# Patient Record
Sex: FEMALE | Race: WHITE | Employment: OTHER | URBAN - METROPOLITAN AREA
[De-identification: names, ages, dates, MRNs, and addresses within clinical notes are randomized per-mention and may not be internally consistent; named-entity substitution may affect disease eponyms.]

---

## 2019-12-17 ENCOUNTER — OFFICE VISIT (OUTPATIENT)
Dept: NEUROPSYCHOLOGY | Facility: CLINIC | Age: 74
End: 2019-12-17
Payer: MEDICARE

## 2019-12-17 DIAGNOSIS — G31.84 MCI (MILD COGNITIVE IMPAIRMENT): Primary | ICD-10-CM

## 2019-12-17 NOTE — NURSING NOTE
The patient was seen for neuropsychological evaluation at the request of Dr. Ketan Vieira, for the purposes of diagnostic clarification and treatment planning. 160 minutes of test administration and scoring were provided by this writer, Austin Adamson. Please see Dr. Zaheer Herrera's report for a full interpretation of the findings.

## 2020-01-08 NOTE — PROGRESS NOTES
Name: Lisa Galloway MRN: [id number]  : 1945 STANTON: 2019  Staff: MATHEUS Tech: ALIREZA Age: 74  Sex: Female Hand: LH Educ: 16  Vision: 20/40 ?with correction / ?without correction    ORIENTATION     Time  -5     Place       Personal info     3 /4     Presidents 3 /6    WRAT4   SS %ile Grade Equiv.     Word Reading  105 63 12.9    WAIS-IV     VCI: (108)    SHAWNEE: (98)          Raw SSa     Similarities  26 11     Vocabulary  45 12     Block Design  24 8     Matrix Reasoning 14 11     Arithmetic  10 7     Coding  59 12    COWAT (CFL)     Raw: 35  SS: 10 %ile: 41-59    ANIMAL NAMING TEST     Raw: 18  SS: 9 T: 45    BOSTON NAMING TEST     Raw: 54  SS: 10 %ile: 41-59    COMPLEX IDEATIONAL MATERIAL     Raw:  SS: 12 T: 54    CLOCK DRAWING: Normal    FINGER TAPPING   Avg  SS T     RH  52 10 64     LH 47.67 10 62     TRAILMAKING TEST   Raw  Err SS %ile     A 43  0 9 29-40     B 151  0 8 19-28    PORTEUS MAZE TEST     Test Age: 10.5    JOSE-O    Raw    T %ile     Time to Copy  150      >16     Copy    33     >16     Short Delay Recall 4.5 29 2    WMS-IV  Raw SS / %ile     LM I  35 11     LM II  16 9     LM Recog. 21 >75th    HVLT-R     Trial 1 2 3      6 7 7  Raw T     Total Learning (1-3) 20 44     Delayed Recall  6 42     Percent Retention 86 50     Recognition Hits/FP 12/0 60    BVMT-R     Trial 1 2 3      2 2 2  Raw T / %ile     Total Learning (1-3) 6 26     Delayed Recall  2 27     Percent Retention 100 >16th     Recognition Hits/FP 3/0 3rd-5th     GDS (30-item)     Raw:  5 Interpretation: Minimal

## 2020-01-08 NOTE — PROGRESS NOTES
NAME: Lisa Galloway  MRN: [id number]  : 1945  STANTON: 2019  [facility]  [address]  [address] [phone]    NEUROPSYCHOLOGICAL EVALUATION    RELEVANT HISTORY AND REASON FOR REFERRAL    This is a report of neuropsychological consultation regarding Lisa Galloway, a 74-year-old, left-handed woman with 16 years of formal education. She presents with concerns about cognitive changes noticed over the last few years and seeming to progressively worsen. Short-term memory seems to be the primary issue, and she reportedly demonstrates rapid forgetting. She also seems to get off task easily. She saw Dr. Ketan Vieira of the [facility] system for neurologic evaluation of these concerns in September. Orientation was mildly reduced, receptive and expressive language functions seemed okay, and the physical examination showed no focal neurologic signs. Her history of breast cancer was discussed, noting that brain MRI showed no evidence of metastases or abnormal enhancement within the meninges (just some small vessel ischemic changes). Some mental health issues could have been contributing to her cognitive problems but did not seem to be the primary concern. Dr. Vieira ordered this neuropsychological evaluation of brain functioning, for further characterization of her cognitive concerns and to aid in diagnosis and treatment planning.    Ms. Galloway is accompanied to this evaluation by her , Margareth, and her son, Nithin. They describe the presenting concerns as above. They note that she slipped and fell on ice a few weeks ago. She struck the rear of her head, but there was no loss of consciousness and no apparent cognitive changes afterward. She did not seek out clinical attention. There have been no other concerns about potential TBI. She has never had a stroke. She has never had a seizure. She has some occasional dizziness, but there are no  major issues with balance, coordination, or motor control. Her mental status does not fluctuate significantly. She has never had an episode of delirium. She has never had a hallucinatory experience. There are no indications of REM sleep behaviors. She has been wearing hearing aids for the last 3-4 years. She denies any changes to her senses of smell, taste, or vision.    Her energy is lower than normal. Her sleep feels fine and pretty restorative, but she is generally tired during the day. She denies any unintentional sleeping and does not really take naps. She does not use sleep aids. She drinks about 1-2 cups of coffee per day.    She has lost about 5 pounds over the last year. She describes pain in her right hip that she treats with a topical cream. Additional medications include sertraline, losartan, gabapentin, zoledronic acid, ribociclib, cetirizine, albuterol, and supplements for vitamin C, iron, omega-3 fatty acids, multivitamin, cranberry concentrate, and selenium.    Ms. Galloway lives at home with her . They have been  for 52 years. They have three children, two of whom live in the area and one who lives in [place]. Her  has always done most of the household finances, but he has also had to take over some of the few things that she did. She dislikes driving at night but otherwise says she has not had to change or limit her driving habits.    There is no history of psychiatric hospitalizations. She feels that anxiousness is mild and occasional and does not require clinical attention (sertraline was apparently just recently started). She feels like spending time with friends, such as going for walks, is helpful. She denies any suicidality.    She reports minimal or rare alcohol use with no history of alcohol problems. She denies any use of tobacco or illicit drugs.    There is no known history of early life major medical complications, developmental abnormalities, or academic delays. In  fact, she says she was always at the top of her class in school. She graduated from high school and went on to complete an elementary education degree through [employer]. She worked as a teacher for about two years and then stayed at home to run the household and raise the children. Later on, she was a . 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 She retired around age 62.    Reported family history includes noticeable cognitive decline for her father, without any official diagnosis. His cognitive problems began around age 90, and he  at age 94. Today, they report no other known history of neurologic concerns in the family. Dr. Vieira s chart note says that her mother had cognitive impairment at age 97. There is no known history of mental health concerns in the family.    BEHAVIORAL OBSERVATIONS    Ms. Galloway was polite and cooperative with the evaluation. Mood was neutral, with a mildly tense affective display. She was alert and not somnolent. Immediate attentional control was appropriate. Insight into the referral question was generally appropriate. Thought processes appeared to be linear and goal-directed. Comprehension was normal. There were no aphasic qualities to her speech production. She tended to be mildly slowed in her approach to most tasks. She seemed to work with appropriate care and precision. Her effort and persistence were good throughout the evaluation. The test results are seen as valid estimations of her cognitive abilities.    MEASURES ADMINISTERED    The following measures were administered by a trained psychometrist, under my direct supervision:    Orientation: Time, Place, Basic Personal Information, Recent US Presidents; Wide Range Achievement Test 4: Word Reading; Wechsler Adult Intelligence Scale-IV: Similarities, Vocabulary, Block Design, Matrix Reasoning, Arithmetic, Coding; Controlled Oral Word  Association Test; Animal Naming Test; [place] Naming Test; Complex Ideational Material; Josue Visual Acuity Screen; Clock Drawing; Finger Tapping; Trail Making Test; Porteus Maze Test; Chandrakant-Osterrieth Complex Figure Test; Tran Verbal Learning Test, Revised; Brief Visuospatial Memory Test, Revised; Wechsler Memory Scale-IV: Logical Memory; Geriatric Depression Scale.    RESULTS AND INTERPRETATION    Orientation: Orientation to time was below normal expectations. She was four days off for the date and had the wrong day of the week. Orientation to place was normal. Orientation to basic personal information was lower than expected, misstating her age by two years. She was able to name three of the last six [place] presidents.    Intellectual Abilities: Performance on a reading and pronunciation test that is validated for estimating premorbid intelligence was in the middle average range. Measures of current intellectual functioning were overall commensurately middle average. Abstract verbal analogical reasoning was average. Demonstrating vocabulary knowledge was on the higher side of average. Nonverbal reasoning through hands-on object assembly was on the lower side of average. Visual reasoning through pattern identification was average.    Language & Related Skills: As noted above, basic reading and pronunciation skills were average. Confrontation naming was average. Semantic verbal fluency was average. Letter-based verbal fluency was average. Oral comprehension and making inferences from sentences and brief paragraphs was normal.    Visual Perceptual & Constructional Skills: Binocular, corrected, near-point visual acuity was 20/40 on Josue screening. Clock drawing was normal. Her copy of a complex geometric figure was normal.    Motor Skills: Speeded finger tapping performances were bilaterally high average.    Mental Speed & Executive Functioning: As noted above, speeded verbal fluency performances were average.  Speeded graphomotor clerical substitution was slightly above average. Visual scanning and graphomotor sequencing was average under simple conditions and below average under greater executive demands to control divided attention. Planning, foresight, and learning from errors were below normal expectations on an unspeeded maze-solving test.    Attention & Working Memory: Immediate auditory attention and working memory were low average for repeating and rearranging digit strings.    Learning & Anterograde Memory: A few minutes after the initial copy, incidental free recall of the complex figure was in the borderline impaired range for her age. On another test, learning a display of simple designs over repeated viewings was impaired. Delayed free recall of the simple designs was impaired, and recognition of the designs was in the borderline impaired range. Learning a word list over repeated readings was lower average. Delayed free recall of the list was commensurately low average, while recognition of the list was perfect. Immediate verbal memory for short stories was average. Delayed story recall was average, and recognition of story details was high average.    Emotional Functioning: On a brief self-report inventory, she endorsed minimally present symptoms related to depression and anxiety (GDS = 5/30). She endorsed feeling like her mind is not as clear as it used to be, having difficulty making decisions, not having sufficient energy, having difficulty getting started on new projects, and preferring to stay at home rather than going out and doing new things.    IMPRESSIONS    The neuropsychological results are mildly abnormal. A subset of tasks are performed below normal expectations. Verbal memory performances are in the average range, while visual memory performances are impaired. There are lower than normal performances for temporal orientation, orientation to basic personal information, and executive functioning  or problem-solving under conditions requiring complex visuospatial information management. Fine-motor speed appears to be above average, with no demonstrable laterality effect. Basic language abilities are intact. Basic visual perceptual abilities appear to be intact, and general intellectual abilities are average. I do not see indications of etiologically significant mental health concerns.    The data would suggest some difficulties among the medial temporal and frontal-subfrontal systems. Such cerebral dysfunction might be greater in the right hemisphere compared to the left, though there is slightly increased chance for atypical cortical organization in a left-handed individual. The clinical history and current cognitive data could be suggest an early neurodegenerative process.     Etiologic considerations would include but are not limited to Alzheimer s disease. Cerebrovascular contributions are possible. At this time, the cognitive data would primarily suggest a diagnosis of mild cognitive impairment.    RECOMMENDATIONS    Continued neurologic care and monitoring are certainly indicated. I defer to Dr. Vieira regarding any treatment options.    She should endeavor to stay as active and healthy as possible, filling her days with a variety of intrinsically enjoyable mental, social, and physical activities. A healthy diet and optimal sleep quality would also be good clinical goals.    I would encourage caution with driving. Her  or son should regularly ride with her as a passenger, to monitor for any potential concerns. They should bring any concerns to the attention of Dr. Vieira or her primary care provider.    A neuropsychological baseline has been established, and I would like to follow her over time. A return for reevaluation in approximately 12 months is advised. I would always be happy to see her sooner or later than that, as clinically indicated.    Zaheer Herrera, PhD, LP, ABPP-CN  Board  Certified in Clinical Neuropsychology  Licensed Psychologist [id number]     Department of Rehabilitation Medicine  Division of Adult Neuropsychology  [facility]      Time spent: One hour neurobehavioral status exam including interview, clinical assessment by licensed and board-certified neuropsychologist (CPT 84359). One hour neuropsychological testing evaluation by licensed and board-certified neuropsychologist, including integration of patient data, interpretation of standardized test results and clinical data, clinical decision-making, treatment planning, report, and interactive feedback to the patient, first hour (CPT 54722). One hour of neuropsychological testing evaluation by licensed and board-certified neuropsychologist, including integration of patient data, interpretation of standardized test results and clinical data, clinical decision-making, treatment planning, report, and interactive feedback to the patient, subsequent hours (CPT 11342). 30 minutes of psychological and neuropsychological test administration and scoring by technician, first 30 minutes (CPT 51116). 130 minutes psychological or neuropsychological test administration and scoring by technician, subsequent 30-minute intervals (CPT 57961). Diagnoses: G31.84

## 2021-01-08 ENCOUNTER — HOSPITAL ENCOUNTER (INPATIENT)
Facility: CLINIC | Age: 76
LOS: 4 days | Discharge: HOME | DRG: 439 | End: 2021-01-12
Attending: EMERGENCY MEDICINE | Admitting: INTERNAL MEDICINE
Payer: MEDICARE

## 2021-01-08 DIAGNOSIS — K85.90 ACUTE PANCREATITIS, UNSPECIFIED COMPLICATION STATUS, UNSPECIFIED PANCREATITIS TYPE: ICD-10-CM

## 2021-01-08 LAB
ALBUMIN SERPL-MCNC: 3.7 G/DL (ref 3.4–5)
ALP SERPL-CCNC: 84 U/L (ref 40–150)
ALT SERPL W P-5'-P-CCNC: 9 U/L (ref 0–50)
ANION GAP SERPL CALCULATED.3IONS-SCNC: 9 MMOL/L (ref 3–14)
AST SERPL W P-5'-P-CCNC: 20 U/L (ref 0–45)
BASOPHILS # BLD AUTO: 0 10E9/L (ref 0–0.2)
BASOPHILS NFR BLD AUTO: 0.7 %
BILIRUB SERPL-MCNC: 0.7 MG/DL (ref 0.2–1.3)
BUN SERPL-MCNC: 15 MG/DL (ref 7–30)
CALCIUM SERPL-MCNC: 9.5 MG/DL (ref 8.5–10.1)
CHLORIDE SERPL-SCNC: 104 MMOL/L (ref 94–109)
CO2 SERPL-SCNC: 25 MMOL/L (ref 20–32)
CREAT SERPL-MCNC: 1.21 MG/DL (ref 0.52–1.04)
DIFFERENTIAL METHOD BLD: ABNORMAL
EOSINOPHIL # BLD AUTO: 0 10E9/L (ref 0–0.7)
EOSINOPHIL NFR BLD AUTO: 0.7 %
ERYTHROCYTE [DISTWIDTH] IN BLOOD BY AUTOMATED COUNT: 14.3 % (ref 10–15)
GFR SERPL CREATININE-BSD FRML MDRD: 44 ML/MIN/{1.73_M2}
GLUCOSE SERPL-MCNC: 95 MG/DL (ref 70–99)
HCT VFR BLD AUTO: 34.1 % (ref 35–47)
HGB BLD-MCNC: 11.6 G/DL (ref 11.7–15.7)
IMM GRANULOCYTES # BLD: 0 10E9/L (ref 0–0.4)
IMM GRANULOCYTES NFR BLD: 0.7 %
LIPASE SERPL-CCNC: 1206 U/L (ref 73–393)
LYMPHOCYTES # BLD AUTO: 1.1 10E9/L (ref 0.8–5.3)
LYMPHOCYTES NFR BLD AUTO: 26.4 %
MCH RBC QN AUTO: 35.9 PG (ref 26.5–33)
MCHC RBC AUTO-ENTMCNC: 34 G/DL (ref 31.5–36.5)
MCV RBC AUTO: 106 FL (ref 78–100)
MONOCYTES # BLD AUTO: 0.4 10E9/L (ref 0–1.3)
MONOCYTES NFR BLD AUTO: 9.5 %
NEUTROPHILS # BLD AUTO: 2.5 10E9/L (ref 1.6–8.3)
NEUTROPHILS NFR BLD AUTO: 62 %
NRBC # BLD AUTO: 0 10*3/UL
NRBC BLD AUTO-RTO: 0 /100
PLATELET # BLD AUTO: 267 10E9/L (ref 150–450)
POTASSIUM SERPL-SCNC: 3.7 MMOL/L (ref 3.4–5.3)
PROT SERPL-MCNC: 7.8 G/DL (ref 6.8–8.8)
RBC # BLD AUTO: 3.23 10E12/L (ref 3.8–5.2)
SODIUM SERPL-SCNC: 138 MMOL/L (ref 133–144)
WBC # BLD AUTO: 4 10E9/L (ref 4–11)

## 2021-01-08 PROCEDURE — 120N000004 HC R&B MS OVERFLOW

## 2021-01-08 PROCEDURE — 250N000011 HC RX IP 250 OP 636: Performed by: EMERGENCY MEDICINE

## 2021-01-08 PROCEDURE — 99285 EMERGENCY DEPT VISIT HI MDM: CPT | Mod: 25

## 2021-01-08 PROCEDURE — 87635 SARS-COV-2 COVID-19 AMP PRB: CPT | Performed by: EMERGENCY MEDICINE

## 2021-01-08 PROCEDURE — 80053 COMPREHEN METABOLIC PANEL: CPT | Performed by: EMERGENCY MEDICINE

## 2021-01-08 PROCEDURE — 85025 COMPLETE CBC W/AUTO DIFF WBC: CPT | Performed by: EMERGENCY MEDICINE

## 2021-01-08 PROCEDURE — 96361 HYDRATE IV INFUSION ADD-ON: CPT

## 2021-01-08 PROCEDURE — C9803 HOPD COVID-19 SPEC COLLECT: HCPCS

## 2021-01-08 PROCEDURE — 96375 TX/PRO/DX INJ NEW DRUG ADDON: CPT

## 2021-01-08 PROCEDURE — 99223 1ST HOSP IP/OBS HIGH 75: CPT | Mod: AI | Performed by: INTERNAL MEDICINE

## 2021-01-08 PROCEDURE — 258N000003 HC RX IP 258 OP 636: Performed by: EMERGENCY MEDICINE

## 2021-01-08 PROCEDURE — 83690 ASSAY OF LIPASE: CPT | Performed by: EMERGENCY MEDICINE

## 2021-01-08 PROCEDURE — 96374 THER/PROPH/DIAG INJ IV PUSH: CPT

## 2021-01-08 RX ORDER — SODIUM CHLORIDE 9 MG/ML
INJECTION, SOLUTION INTRAVENOUS CONTINUOUS
Status: DISCONTINUED | OUTPATIENT
Start: 2021-01-08 | End: 2021-01-09

## 2021-01-08 RX ORDER — ONDANSETRON 2 MG/ML
4 INJECTION INTRAMUSCULAR; INTRAVENOUS EVERY 30 MIN PRN
Status: DISCONTINUED | OUTPATIENT
Start: 2021-01-08 | End: 2021-01-12 | Stop reason: HOSPADM

## 2021-01-08 RX ORDER — HYDROMORPHONE HYDROCHLORIDE 1 MG/ML
0.5 INJECTION, SOLUTION INTRAMUSCULAR; INTRAVENOUS; SUBCUTANEOUS
Status: DISCONTINUED | OUTPATIENT
Start: 2021-01-08 | End: 2021-01-09

## 2021-01-08 RX ADMIN — SODIUM CHLORIDE 1000 ML: 9 INJECTION, SOLUTION INTRAVENOUS at 23:14

## 2021-01-08 RX ADMIN — ONDANSETRON HYDROCHLORIDE 4 MG: 2 INJECTION, SOLUTION INTRAMUSCULAR; INTRAVENOUS at 22:19

## 2021-01-08 RX ADMIN — HYDROMORPHONE HYDROCHLORIDE 0.5 MG: 1 INJECTION, SOLUTION INTRAMUSCULAR; INTRAVENOUS; SUBCUTANEOUS at 22:18

## 2021-01-08 RX ADMIN — SODIUM CHLORIDE 1000 ML: 9 INJECTION, SOLUTION INTRAVENOUS at 22:19

## 2021-01-08 ASSESSMENT — ENCOUNTER SYMPTOMS
NAUSEA: 1
APPETITE CHANGE: 1
ABDOMINAL PAIN: 1

## 2021-01-08 ASSESSMENT — MIFFLIN-ST. JEOR: SCORE: 1096.89

## 2021-01-09 LAB
ANION GAP SERPL CALCULATED.3IONS-SCNC: 5 MMOL/L (ref 3–14)
BUN SERPL-MCNC: 13 MG/DL (ref 7–30)
CALCIUM SERPL-MCNC: 7.6 MG/DL (ref 8.5–10.1)
CHLORIDE SERPL-SCNC: 115 MMOL/L (ref 94–109)
CO2 SERPL-SCNC: 23 MMOL/L (ref 20–32)
CREAT SERPL-MCNC: 1.07 MG/DL (ref 0.52–1.04)
ERYTHROCYTE [DISTWIDTH] IN BLOOD BY AUTOMATED COUNT: 14.6 % (ref 10–15)
GFR SERPL CREATININE-BSD FRML MDRD: 50 ML/MIN/{1.73_M2}
GLUCOSE SERPL-MCNC: 123 MG/DL (ref 70–99)
HCT VFR BLD AUTO: 25.2 % (ref 35–47)
HGB BLD-MCNC: 8.4 G/DL (ref 11.7–15.7)
LABORATORY COMMENT REPORT: NORMAL
LIPASE SERPL-CCNC: 1538 U/L (ref 73–393)
MAGNESIUM SERPL-MCNC: 1.8 MG/DL (ref 1.6–2.3)
MCH RBC QN AUTO: 36.4 PG (ref 26.5–33)
MCHC RBC AUTO-ENTMCNC: 33.3 G/DL (ref 31.5–36.5)
MCV RBC AUTO: 109 FL (ref 78–100)
PLATELET # BLD AUTO: 192 10E9/L (ref 150–450)
POTASSIUM SERPL-SCNC: 3.6 MMOL/L (ref 3.4–5.3)
POTASSIUM SERPL-SCNC: 3.9 MMOL/L (ref 3.4–5.3)
RBC # BLD AUTO: 2.31 10E12/L (ref 3.8–5.2)
SARS-COV-2 RNA RESP QL NAA+PROBE: NEGATIVE
SODIUM SERPL-SCNC: 143 MMOL/L (ref 133–144)
SPECIMEN SOURCE: NORMAL
TRIGL SERPL-MCNC: 120 MG/DL
WBC # BLD AUTO: 3 10E9/L (ref 4–11)

## 2021-01-09 PROCEDURE — 80048 BASIC METABOLIC PNL TOTAL CA: CPT | Performed by: INTERNAL MEDICINE

## 2021-01-09 PROCEDURE — 99207 PR CDG-MDM COMPONENT: MEETS LOW - DOWN CODED: CPT | Performed by: HOSPITALIST

## 2021-01-09 PROCEDURE — 258N000003 HC RX IP 258 OP 636: Performed by: INTERNAL MEDICINE

## 2021-01-09 PROCEDURE — 250N000011 HC RX IP 250 OP 636: Performed by: INTERNAL MEDICINE

## 2021-01-09 PROCEDURE — 999N000127 HC STATISTIC PERIPHERAL IV START W US GUIDANCE

## 2021-01-09 PROCEDURE — 120N000006 HC R&B PEDS

## 2021-01-09 PROCEDURE — 120N000004 HC R&B MS OVERFLOW

## 2021-01-09 PROCEDURE — 36415 COLL VENOUS BLD VENIPUNCTURE: CPT | Performed by: INTERNAL MEDICINE

## 2021-01-09 PROCEDURE — 85027 COMPLETE CBC AUTOMATED: CPT | Performed by: INTERNAL MEDICINE

## 2021-01-09 PROCEDURE — 258N000003 HC RX IP 258 OP 636: Performed by: HOSPITALIST

## 2021-01-09 PROCEDURE — 84132 ASSAY OF SERUM POTASSIUM: CPT | Performed by: INTERNAL MEDICINE

## 2021-01-09 PROCEDURE — 99232 SBSQ HOSP IP/OBS MODERATE 35: CPT | Performed by: HOSPITALIST

## 2021-01-09 PROCEDURE — 83690 ASSAY OF LIPASE: CPT | Performed by: INTERNAL MEDICINE

## 2021-01-09 PROCEDURE — 84478 ASSAY OF TRIGLYCERIDES: CPT | Performed by: INTERNAL MEDICINE

## 2021-01-09 PROCEDURE — 83735 ASSAY OF MAGNESIUM: CPT | Performed by: INTERNAL MEDICINE

## 2021-01-09 PROCEDURE — C9113 INJ PANTOPRAZOLE SODIUM, VIA: HCPCS | Performed by: INTERNAL MEDICINE

## 2021-01-09 RX ORDER — NALOXONE HYDROCHLORIDE 0.4 MG/ML
0.2 INJECTION, SOLUTION INTRAMUSCULAR; INTRAVENOUS; SUBCUTANEOUS
Status: DISCONTINUED | OUTPATIENT
Start: 2021-01-09 | End: 2021-01-12 | Stop reason: HOSPADM

## 2021-01-09 RX ORDER — ELECTROLYTES/DEXTROSE
1 SOLUTION, ORAL ORAL DAILY
COMMUNITY

## 2021-01-09 RX ORDER — LIDOCAINE 40 MG/G
CREAM TOPICAL
Status: DISCONTINUED | OUTPATIENT
Start: 2021-01-09 | End: 2021-01-12 | Stop reason: HOSPADM

## 2021-01-09 RX ORDER — SODIUM CHLORIDE, SODIUM LACTATE, POTASSIUM CHLORIDE, CALCIUM CHLORIDE 600; 310; 30; 20 MG/100ML; MG/100ML; MG/100ML; MG/100ML
INJECTION, SOLUTION INTRAVENOUS CONTINUOUS
Status: DISCONTINUED | OUTPATIENT
Start: 2021-01-09 | End: 2021-01-12

## 2021-01-09 RX ORDER — NALOXONE HYDROCHLORIDE 0.4 MG/ML
0.4 INJECTION, SOLUTION INTRAMUSCULAR; INTRAVENOUS; SUBCUTANEOUS
Status: DISCONTINUED | OUTPATIENT
Start: 2021-01-09 | End: 2021-01-12 | Stop reason: HOSPADM

## 2021-01-09 RX ORDER — DONEPEZIL HYDROCHLORIDE 10 MG/1
10 TABLET, FILM COATED ORAL AT BEDTIME
COMMUNITY

## 2021-01-09 RX ORDER — ONDANSETRON 4 MG/1
4 TABLET, FILM COATED ORAL EVERY 8 HOURS PRN
Status: ON HOLD | COMMUNITY
End: 2021-01-28

## 2021-01-09 RX ORDER — ACETAMINOPHEN 325 MG/1
325-650 TABLET ORAL EVERY 6 HOURS PRN
COMMUNITY

## 2021-01-09 RX ORDER — ONDANSETRON 4 MG/1
4 TABLET, ORALLY DISINTEGRATING ORAL EVERY 6 HOURS PRN
Status: DISCONTINUED | OUTPATIENT
Start: 2021-01-09 | End: 2021-01-12 | Stop reason: HOSPADM

## 2021-01-09 RX ORDER — HYDROMORPHONE HYDROCHLORIDE 1 MG/ML
0.2 INJECTION, SOLUTION INTRAMUSCULAR; INTRAVENOUS; SUBCUTANEOUS
Status: DISCONTINUED | OUTPATIENT
Start: 2021-01-09 | End: 2021-01-12 | Stop reason: HOSPADM

## 2021-01-09 RX ORDER — ONDANSETRON 2 MG/ML
4 INJECTION INTRAMUSCULAR; INTRAVENOUS EVERY 6 HOURS PRN
Status: DISCONTINUED | OUTPATIENT
Start: 2021-01-09 | End: 2021-01-12 | Stop reason: HOSPADM

## 2021-01-09 RX ORDER — GABAPENTIN 300 MG/1
900 CAPSULE ORAL AT BEDTIME
COMMUNITY

## 2021-01-09 RX ADMIN — PANTOPRAZOLE SODIUM 40 MG: 40 INJECTION, POWDER, FOR SOLUTION INTRAVENOUS at 09:16

## 2021-01-09 RX ADMIN — PANTOPRAZOLE SODIUM 40 MG: 40 INJECTION, POWDER, FOR SOLUTION INTRAVENOUS at 01:35

## 2021-01-09 RX ADMIN — SODIUM CHLORIDE, POTASSIUM CHLORIDE, SODIUM LACTATE AND CALCIUM CHLORIDE: 600; 310; 30; 20 INJECTION, SOLUTION INTRAVENOUS at 18:24

## 2021-01-09 RX ADMIN — HYDROMORPHONE HYDROCHLORIDE 0.2 MG: 1 INJECTION, SOLUTION INTRAMUSCULAR; INTRAVENOUS; SUBCUTANEOUS at 20:16

## 2021-01-09 RX ADMIN — DEXTROSE AND SODIUM CHLORIDE: 5; 900 INJECTION, SOLUTION INTRAVENOUS at 00:57

## 2021-01-09 RX ADMIN — PANTOPRAZOLE SODIUM 40 MG: 40 INJECTION, POWDER, FOR SOLUTION INTRAVENOUS at 20:10

## 2021-01-09 RX ADMIN — SODIUM CHLORIDE, POTASSIUM CHLORIDE, SODIUM LACTATE AND CALCIUM CHLORIDE: 600; 310; 30; 20 INJECTION, SOLUTION INTRAVENOUS at 11:25

## 2021-01-09 ASSESSMENT — ACTIVITIES OF DAILY LIVING (ADL)
USE_OF_HEARING_ASSISTIVE_DEVICES: BILATERAL HEARING AIDS
WERE_AUXILIARY_AIDS_OFFERED?: NO
HEARING_DIFFICULTY_OR_DEAF: YES
DESCRIBE_HEARING_LOSS: BILATERAL HEARING LOSS

## 2021-01-09 ASSESSMENT — MIFFLIN-ST. JEOR: SCORE: 1087.82

## 2021-01-09 NOTE — H&P
"[facility]    Hospitalist History and Physical    Name: Lisa Galloway    MRN: [id number]  YOB: 1945    Age: 75 year old  Date of Admission:  1/8/2021  Date of Service (when I saw the patient): 01/08/21    Assessment & Plan   Lisa Galloway is a 75 year old female with past medical history significant for metastatic breast cancer with osseous mets recent PET scan showing skeletal lesions, presented to urgent care with right sided upper abdominal pain and flank pain, nausea vomiting and unable to keep anything down.  CT abdomen pelvis showed evidence of acute pancreatitis versus PUD.    Acute pancreatitis  -Elevated lipase 1200  -CT at [facility] showed \"1. Inflammation about the pancreatic head and/or descending duodenum, suspicious for acute pancreatitis versus duodenitis, including consideration for peptic ulcer disease. This results in a small amount of free fluid in the abdomen, without organized abscess or confluent ascites.  2. Apparent thickening of the distal esophagus. This is favored to represent decompressed stomach of previously demonstrated sliding esophageal hiatal hernia, but would consider outpatient upper endoscopy to exclude esophageal neoplasm. This is not an acute finding.  3. Trace cholelithiasis.  4. Again seen is widespread osteosclerotic metastatic disease.\"    -Ultrasound at [facility] showed\"  1. Cholelithiasis without convincing sonographic evidence of acute cholecystitis. Possible small amount of free fluid adjacent to the gallbladder is nonspecific and can be seen with aggressive hydration or an acute or chronic intra-abdominal process.  2. Mild right-sided hydronephrosis. The patient's right ureteral stent is not well seen.\"    -Clinically pain was described as progressive and colicky in nature could be related to cholelithiasis  -Cannot exclude peptic ulcer disease  -N.p.o.  -IV fluids  -As needed pain meds  -IV PPI  -We will check " "triglyceride levels in a.m. patient has history of hyperlipidemia    Acute renal failure  -Patient has mild hydronephrosis on the right side and has had ureteral stent which was not well visualized on the imaging as noted above  -If renal functions do not improve with hydration consider CT kidneys without contrast  -Most likely due to nausea vomiting and dehydration  -IV fluid    History of GERD cannot exclude PUD  -Start IV PPI  -GI consult in a.m.      History of breast cancer  -Recent PET scan December' 20 at [facility] showed \" 1. Increasing and new activity in skeletal lesions as above.2. No evidence of new extraosseous metastatic involvement\"  -Followed by oncology at [facility]    History of asthma  -No evidence of exacerbation  -As needed albuterol    History of anxiety  -Patient clinically calm    Other: Med rec not available at this time will need to reconcile once available      DVT Prophylaxis: Pneumatic Compression Devices  Code Status: DNR / DNI discussed    Admitted as inpatient    Primary Care Physician   Nichole Lira (oncology)    Chief Complaint   Right upper quadrant abdominal pain 1 day  Nausea and vomiting 1 day    History is obtained from the patient    History of Present Illness   Lisa Galloway is a 75 year old female with history of metastatic breast cancer, hyperlipidemia, hypertension, GERD presents with right upper quadrant abdominal pain.  Pain started yesterday morning progressive in nature intermittent colicky sharp 5 out of 10 in intensity associated with nausea and vomiting.  Vomited about 3 times since then vomitus was mainly clear fluids and bile no blood.  Unable to keep anything down.  Pain worse with eating.  Denies any recent use of alcohol.  Does not smoke.  No new change in medications.  Denies any diarrhea.  No fever no chills and exposure to anyone with COVID-19.  No chest pain or shortness of breath.  Review of all the other symptoms are negative.    Past " Medical History    No past medical history on file.     Hyperlipidemia #*LW 6 10/3/2004     Ca Breast Female 5/19/2008     Hypertension  3/29/2011     Esophageal reflux 10/7/2005 mild stricture 2005 ; Gastroesophageal Reflux Disease   Asthma 1/24/2013     Depression ([facility])       Anxiety ([facility])             Past Surgical History   No past surgical history on file.    Prior to Admission Medications   None     Allergies   Allergies   Allergen Reactions     Amoxicillin-Pot Clavulanate Itching     Erythromycin Nausea     Latex      PN: Latex Sensitivity Flag     Levofloxacin      PN: joint pain     Sulfa Drugs GI Disturbance     Oxycodone Rash       Social History   Social History     Tobacco Use     Smoking status: Not on file   Substance Use Topics     Alcohol use: Not on file     Social History     Social History Narrative     Not on file   Lives with her .  Alcohol rarely last drink was on [place].  Patient is nonsmoker    Family History     Heart Disease Birth Mother       Heart Failure Birth Mother       High Blood Pressure Birth Mother       Lung Disease Birth Mother       Cancer, Breast Daughter       Cancer, Breast Maternal Aunt Mercedes     Cancer, Breast Maternal Aunt Celestina     Lung Disease Sister Shona pulmonary nodules, sob   Lung Disease Sister Carlee COPD         Review of Systems   A Comprehensive greater than 10 system review of systems was carried out.  Pertinent positives and negatives are noted above.  Otherwise negative for contributory information.    Physical Exam   Temp: 98.9  F (37.2  C) Temp src: Oral BP: 121/79 Pulse: 77   Resp: 16 SpO2: 100 % O2 Device: None (Room air)    Vital Signs with Ranges  Temp:  [98.9  F (37.2  C)] 98.9  F (37.2  C)  Pulse:  [77-82] 77  Resp:  [16] 16  BP: (119-121)/(79-89) 121/79  SpO2:  [98 %-100 %] 100 %  143 lbs 0 oz    GEN:  Alert, oriented x 3, appears comfortable, no overt distress  HEENT:  Normocephalic/atraumatic, no scleral icterus, no nasal discharge,  mouth moist.  CV:  Regular rate and rhythm, no murmur or JVD.  S1 + S2 noted, no S3 or S4.  LUNGS:  Clear to auscultation bilaterally without rales/rhonchi/wheezing/retractions.  Symmetric chest rise on inhalation noted.  ABD:  Active bowel sounds, soft, minimal right upper quadrant tenderness.  No rebound/guarding/rigidity.  EXT:  No edema.  No cyanosis.  No joint synovitis noted.  SKIN:  Dry to touch, mucosa dry  NEURO:  Symmetric muscle strength,  No new focal deficits appreciated.    Data   Data reviewed today:  I personally reviewed the abdominal CT image(s) showing Findings concerning for pancreatitis.    No results for input(s): PH, PHV, PO2, PO2V, SAT, PCO2, PCO2V, HCO3, HCO3V in the last 168 hours.  Recent Labs   Lab 01/08/21 2218   WBC 4.0   HGB 11.6*   HCT 34.1*   *        Recent Labs   Lab 01/08/21 2218      POTASSIUM 3.7   CHLORIDE 104   CO2 25   ANIONGAP 9   GLC 95   BUN 15   CR 1.21*   GFRESTIMATED 44*   GFRESTBLACK 50*   EARL 9.5     No results for input(s): CULT in the last 168 hours.  Recent Labs   Lab 01/08/21 2218      POTASSIUM 3.7   CHLORIDE 104   CO2 25   ANIONGAP 9   GLC 95   BUN 15   CR 1.21*   GFRESTIMATED 44*   GFRESTBLACK 50*   EARL 9.5   PROTTOTAL 7.8   ALBUMIN 3.7   BILITOTAL 0.7   ALKPHOS 84   AST 20   ALT 9     No results for input(s): NTBNPI, NTBNP in the last 168 hours.  No results for input(s): SED, CRP in the last 168 hours.  Recent Labs   Lab 01/08/21 2218   AST 20   ALT 9   ALKPHOS 84   BILITOTAL 0.7     No results for input(s): INR in the last 168 hours.  No results for input(s): LACT in the last 168 hours.  Recent Labs   Lab 01/08/21 2218   LIPASE 1,206*     No results for input(s): TROPONIN, TROPI, TROPR in the last 168 hours.    Invalid input(s): TROP, TROPONINIES  No results for input(s): COLOR, APPEARANCE, URINEGLC, URINEBILI, URINEKETONE, SG, UBLD, URINEPH, PROTEIN, UROBILINOGEN, NITRITE, LEUKEST, RBCU, WBCU in the last 168 hours.    No  results found for this or any previous visit (from the past 24 hour(s)).

## 2021-01-09 NOTE — PROGRESS NOTES
[facility]    Hospitalist Progress Note  Name: Lisa Galloway    MRN: [id number]  Provider:  Ketan Levi DO, MPH  Date of Service: 01/09/2021    Summary of Stay: Lisa Galloway is a 75 year old female with a history of static breast cancer with osseous metastases noted on recent PET scan admitted on 1/8/2021 with abdominal pain thought due to pancreatitis and cholelithiasis and peptic ulcer disease.  Also has acute kidney injury with creatinine improving with IV fluids.  Started on IV PPI, bowel rest, and IV fluids with GI consulted.    Problem List:   1. Acute pancreatitis: No reported alcohol use.  Some cholelithiasis noted on ultrasound but no obstructing stone.  I suspect she probably had gallstone pancreatitis.  Triglycerides normal.  Change fluids to lactated Ringer and increase rate to 150 cc/h.  Continue n.p.o. status and follow-up GI recommendations.  Continue analgesics and antiemetics as needed.  2. Acute kidney injury: Mild hydronephrosis on imaging of the right with a ureteral stent not well visualized.  Creatinine is improving.  Continue fluids.  Hold off on further imaging or urology intervention for the stent.  3. GERD and peptic ulcer disease: Continue IV PPI.  Gastroenterology consulted.  4. Asthma: No evidence of exacerbation.  Albuterol MDI as needed.  5. Anxiety: Currently calm.  6. Metastatic breast cancer: PET scan in December from [place] showed increasing and new activity in the skeletal lesions and new extraosseous metastatic involvement.  Followed by oncology.  No indication for inpatient consult.    DVT Prophylaxis: Pneumatic Compression Devices  Code Status: No CPR- Do NOT Intubate  Diet: NPO for Medical/Clinical Reasons Except for: Meds, Ice Chips    Yo Catheter: not present  Disposition: Expected discharge in 2 days to home. Goals prior to discharge include manage pancreatitis.   Incidental Findings: As above.  Family updated today: No     Interval  History   Assumed care from previous hospitalist. The history was fully reviewed.  The patient reports doing well. No chest pain or shortness of breath. No nausea, vomiting, diarrhea, constipation. Still some diffuse abdominal pain but better. No fevers. No other specific complaints identified.     -Data reviewed today: I personally reviewed all new labs and imaging results over the last 24 hours.     Physical Exam   Temp: 97.8  F (36.6  C) Temp src: Oral BP: 99/58 Pulse: 62   Resp: 18 SpO2: 99 % O2 Device: None (Room air)    Vitals:    01/08/21 1822 01/09/21 0044   Weight: 64.9 kg (143 lb) 64 kg (141 lb)     Vital Signs with Ranges  Temp:  [97.8  F (36.6  C)-98.9  F (37.2  C)] 97.8  F (36.6  C)  Pulse:  [62-82] 62  Resp:  [16-18] 18  BP: ()/(58-89) 99/58  SpO2:  [93 %-100 %] 99 %  I/O last 3 completed shifts:  In: 356.67 [I.V.:356.67]  Out: -     GENERAL: No apparent distress. Awake, alert, and fully oriented.  HEENT: Normocephalic, atraumatic. Extraocular movements intact.  CARDIOVASCULAR: Regular rate and rhythm without murmurs or rubs. No S3.  PULMONARY: Clear bilaterally.  GASTROINTESTINAL: Soft, tender, non-distended. Bowel sounds normoactive.   EXTREMITIES: No cyanosis or clubbing. No edema.  NEUROLOGICAL: CN 2-12 grossly intact, no focal neurological deficits.  DERMATOLOGICAL: No rash, ulcer, bruising, nor jaundice.     Medications     lactated ringers       sodium chloride         pantoprazole (PROTONIX) IV  40 mg Intravenous BID     sodium chloride (PF)  3 mL Intracatheter Q8H     Data     Laboratory:  Recent Labs   Lab 01/09/21  0648 01/08/21  2218   WBC 3.0* 4.0   HGB 8.4* 11.6*   HCT 25.2* 34.1*   * 106*    267     Recent Labs   Lab 01/09/21  0648 01/09/21  0119 01/08/21  2218     --  138   POTASSIUM 3.6 3.9 3.7   CHLORIDE 115*  --  104   CO2 23  --  25   ANIONGAP 5  --  9   *  --  95   BUN 13  --  15   CR 1.07*  --  1.21*   GFRESTIMATED 50*  --  44*   GFRESTBLACK 59*   --  50*   EARL 7.6*  --  9.5     Recent Labs   Lab 01/08/21 2218   AST 20   ALT 9   ALKPHOS 84   BILITOTAL 0.7     Recent Labs   Lab 01/09/21  0648 01/08/21 2218   LIPASE 1,538* 1,206*     No results for input(s): CULT in the last 168 hours.    Imaging:  No results found for this or any previous visit (from the past 24 hour(s)).      Ketan Levi DO MPH  [facility] Hospitalist  [address] [place] [address]  Pager: [phone]  01/09/2021

## 2021-01-09 NOTE — ED NOTES
"[facility]  ED Nurse Handoff Report    Lisa Galloway is a 75 year old female   ED Chief complaint: Abdominal Pain  . ED Diagnosis:   Final diagnoses:   None     Allergies:   Allergies   Allergen Reactions     Amoxicillin-Pot Clavulanate Itching     Erythromycin Nausea     Latex      PN: Latex Sensitivity Flag     Levofloxacin      PN: joint pain     Sulfa Drugs GI Disturbance     Oxycodone Rash       Code Status: Full Code  Activity level - Baseline/Home:  Independent. Activity Level - Current:   Stand by Assist. Lift room needed: No. Bariatric: No   Needed: No   Isolation: No. Infection: Not Applicable.     Vital Signs:   Vitals:    01/08/21 1822   BP: 119/89   Pulse: 82   Resp: 16   Temp: 98.9  F (37.2  C)   TempSrc: Oral   SpO2: 98%   Weight: 64.9 kg (143 lb)   Height: 1.575 m (5' 2\")       Cardiac Rhythm:  ,      Pain level:    Patient confused: No. Patient Falls Risk: Yes.   Elimination Status: has not voided   Patient Report - Initial Complaint: abd pain/nausea. Focused Assessment: tender right upper quadrant into back, dry heaving   Tests Performed: .blood work  Abnormal Results: Labs Ordered and Resulted from Time of ED Arrival Up to the Time of Departure from the ED - No data to display  No orders to display     .   Treatments provided: dilaudid, zofran, fluid bolus  Family Comments: NA  OBS brochure/video discussed/provided to patient:  No  ED Medications:   Medications   0.9% sodium chloride BOLUS (has no administration in time range)     Followed by   0.9% sodium chloride BOLUS (has no administration in time range)     Followed by   sodium chloride 0.9% infusion (has no administration in time range)   ondansetron (ZOFRAN) injection 4 mg (has no administration in time range)   HYDROmorphone (PF) (DILAUDID) injection 0.5 mg (has no administration in time range)     Drips infusing:  No  For the majority of the shift, the patient's behavior Green. Interventions performed were " NA.    Sepsis treatment initiated: No     Patient tested for COVID 19 prior to admission: YES    ED Nurse Name/Phone Number: Esperanza Odell RN,   9:14 PM  RECEIVING UNIT ED HANDOFF REVIEW    Above ED Nurse Handoff Report was reviewed: Yes  Reviewed by: Josy Tello RN on January 9, 2021 at 12:31 AM

## 2021-01-09 NOTE — PLAN OF CARE
Rated mid epigastric pain as 0-to 3. Described pain as mild, dull, ache. Warm pack applied and was able to sleep.  No c/o back pain. Denies nausea. C/O thirst. Gave her a swab to moisten mouth.  IV infusing at 100 ml/hr.Is slightly unsteady ambulating so has been a stand by assist to bathroom. She is slightly forgetful. On falls precaution. Limb alert to right arm. Plan for AM labs. Surgical consult.

## 2021-01-09 NOTE — PROGRESS NOTES
SPIRITUAL HEALTH SERVICES Progress Note  [facility] Med/Surg. overflow in Peds    Visited pt due to  request at admission.  Engaged Mariana in reflective conversation about this hospitalization.  Mariana did not indicate she was experiencing any emotional or spiritual distress but rather was just waiting to find out more about how her medical issues will be resolved.  Pt has good support system from family including spouse and children.  [facility]  Mariana requested a prayer for healing.    Oriented pt to Spiritual Health Services which remains available for any additional spiritual or emotional support needs.    Margie Soliz  Chaplain Resident

## 2021-01-09 NOTE — CONSULTS
"CLINICAL NUTRITION SERVICES  -  ASSESSMENT NOTE      MALNUTRITION:  % Weight Loss:  Weight loss does not meet criteria for malnutrition   % Intake:  Decreased intake does not meet criteria for malnutrition   Subcutaneous Fat Loss:  None observed   Muscle Loss:  Temporal region mild depletion, Clavicle bone region mild depletion and Acromion bone region mild depletion  Fluid Retention: None documented    Malnutrition Diagnosis: Patient does not meet two of the above criteria necessary for diagnosing malnutrition        REASON FOR ASSESSMENT  Lisa Galloway is a 75 year old female seen by Registered Dietitian for Admission Nutrition Risk Screen for positive.    PMH of: Metastatic breast CA.    Admit 2/2: Abdominal pain, found to have acute pancreatitis and cholelithiasis.      NUTRITION HISTORY  - Information obtained from patient and chart.  - Diet at home: Regular.  - Usual intakes: Meals BID-TID.  - Barriers to PO intakes: Decreased appetite during a period of a \"few days\" PTA.  Otherwise was eating at baseline per report.    - Allergies: NKFA.      CURRENT NUTRITION ORDERS  Diet Order:     NPO    Current Intake/Tolerance:  NPO since admission.  GI consult pending.      NUTRITION FOCUSED PHYSICAL ASSESSMENT FOR DIAGNOSING MALNUTRITION)  Yes         Observed:    Muscle wasting (refer to documentation in Malnutrition section)    Obtained from Chart/Interdisciplinary Team:  - Thickening of distal esophagus noted per imaging, \"this is favored to represent decompressed stomach of previously demonstrated sliding esophageal hernia\", outpatient f/u recommended  - No documentation of PI  - Stooling patterns reviewed    ANTHROPOMETRICS  Height: 5' 2\"  Weight: 141 lbs 0 oz  Body mass index is 25.79 kg/m .  Weight Status:  Overweight BMI 25-29.9  Weight History:  Wt Readings from Last 10 Encounters:   01/09/21 64 kg (141 lb)     - Wt fairly stable over the past ~1 month based on wt of 143# from 12/08/2020.   - Patient " "herself reports wt loss over a period of 1 year or more.  Notes she used to weigh closer to 170# though is not quite sure when she last weighed, \"some time ago\".      LABS  Labs reviewed:  Electrolytes  Potassium (mmol/L)   Date Value   01/09/2021 3.6   01/09/2021 3.9   01/08/2021 3.7    Blood Glucose  Glucose (mg/dL)   Date Value   01/09/2021 123 (H)   01/08/2021 95    Inflammatory Markers  WBC (10e9/L)   Date Value   01/09/2021 3.0 (L)   01/08/2021 4.0     Albumin (g/dL)   Date Value   01/08/2021 3.7      Magnesium (mg/dL)   Date Value   01/09/2021 1.8     Sodium (mmol/L)   Date Value   01/09/2021 143   01/08/2021 138    Renal  Urea Nitrogen (mg/dL)   Date Value   01/09/2021 13   01/08/2021 15     Creatinine (mg/dL)   Date Value   01/09/2021 1.07 (H)   01/08/2021 1.21 (H)     Additional  Triglycerides (mg/dL)   Date Value   01/09/2021 120        B/P: 99/58, T: 97.8, P: 62, R: 18      MEDICATIONS  Medications reviewed:    pantoprazole (PROTONIX) IV  40 mg Intravenous BID     sodium chloride (PF)  3 mL Intracatheter Q8H        lactated ringers 150 mL/hr at 01/09/21 1125     sodium chloride            ASSESSED NUTRITION NEEDS PER APPROVED PRACTICE GUIDELINES:    Dosing Weight 64  Estimated Energy Needs: 25-30 Kcal/Kg  Justification: maintenance  Estimated Protein Needs: 1.2-1.5 g pro/Kg  Justification: preservation of lean body mass, age  Estimated Fluid Needs: per MD      NUTRITION DIAGNOSIS:  Inadequate oral intake related to altered GI function as evidenced by meeting 0% needs orally since admit with NPO status.    NUTRITION INTERVENTIONS  Recommendations / Nutrition Prescription  Diet per MD.      Implementation  Nutrition education: Provided education on diet per MD or GI.    Collaboration and Referral of Nutrition care: Discussed POC with current RN.    Nutrition Goals  Diet advancement past NPO w/in 48-72 hrs.       MONITORING AND EVALUATION:  Progress towards goals will be monitored and evaluated per protocol " and Practice Guidelines          Ariana Park RDN, LD  Clinical Dietitian  3rd floor/ICU: [phone]  All other floors: [phone]  Weekend/holiday: [phone]

## 2021-01-09 NOTE — ED TRIAGE NOTES
Pt presents for evaluation of right sided abdominal pain that wraps around to the back starting last night. Has also been feeling fatigued. Feels nauseous currently. Denies any bowel or bladder issues. No hx of kidney stones or gallbladder/pancreas issues. Went to [facility] and was sent here for further eval.

## 2021-01-09 NOTE — PHARMACY-ADMISSION MEDICATION HISTORY
"Admission medication history interview status for this patient is complete. See [facility] admission navigator for allergy information, prior to admission medications and immunization status.     Medication history interview done via telephone during Covid-19 pandemic, indicate source(s): Patient  Medication history resources (including written lists, pill bottles, clinic record): care everywhere  Pharmacy: [facility] [employer] in [place]     Changes made to PTA medication list:  Added: all meds  Deleted: none  Changed: none    Actions taken by pharmacist (provider contacted, etc):None     Additional medication history information: patient wasn't sure about the donepezil, but after talking, remembered that she was taking a medication for her memory. The patient reported that she stopped taking potassium chloride 20 mEq daily, and now just takes a multivitamin. The patient wasn't sure where she is in the ribociclib 28-day cycle, but reported that she took it this past week before \"this episode\" (referring to the chief complaint). The patient reported stopping her sertraline 50 mg daily. There is a zoledronic acid 4 mg/5 mL injection (IV infusion once) on her med list reported as of yesterday, but she said it's possible that she had an infusion.     Medication reconciliation/reorder completed by provider prior to medication history?  N   (Y/N)       Prior to Admission medications    Medication Sig Last Dose Taking? Auth Provider   acetaminophen (TYLENOL) 325 MG tablet Take 325-650 mg by mouth every 6 hours as needed for mild pain 1/8/2021 at Unknown time Yes Unknown, Entered By History   donepezil (ARICEPT) 10 MG tablet Take 10 mg by mouth At Bedtime 1/7/2021 at pm Yes Unknown, Entered By History   gabapentin (NEURONTIN) 300 MG capsule Take 900 mg by mouth 3 times daily For restless leg syndrome 1/7/2021 at pm Yes Unknown, Entered By History   Multiple Vitamins-Minerals (MULTIVITAMIN ADULT) TABS Take 1 tablet by mouth daily " 1/6/2021 at am Yes Unknown, Entered By History   ribociclib (KISQALI) 200 MG tablet Take 2 tablets (400 mg) by mouth daily for 21 days, followed by 7 days off in a 28 day cycle Past week Yes Unknown, Entered By History   ondansetron (ZOFRAN) 4 MG tablet Take by mouth every 8 hours as needed for nausea More than a month at Unknown time  Unknown, Entered By History

## 2021-01-09 NOTE — ED PROVIDER NOTES
"  History   Chief Complaint:  Abdominal Pain     The patient was seen in room 38  The history is provided by the patient and medical records.      Lisa Galloway is a 75 year old female with history of cancer, GERD, and hypertension who presents with worsening right sided abdominal pain with radiation to the back along with nausea that started yesterday.  She went to an urgent care earlier today and had a large workup including labs, CT, US and was diagnosed with pancreatitis, see below. She denies vomiting secondary to not eating. She has not had any fevers or chills. Patient has not had any leg swelling or pain. No diarrhea.    Review of Systems   Constitutional: Positive for appetite change.   Gastrointestinal: Positive for abdominal pain and nausea.   All other systems reviewed and are negative.      Allergies:  Amoxicillin-Pot Clavulanate  Erythromycin  Latex  Levofloxacin  Sulfa Drugs  Oxycodone    Medications:  Kisqali    Past Medical History:    DAWSON  Complicated UTI  Hydronephrosis  Osteomyelitis  Carcinoma of breast metastatic to bone  Adenomatous polyp of colon  Anxiety  Lymphedema of arm  BCC of nose  Hypertension  Hyperlipidemia  DJD  GERD  Depression    Past Surgical History:    Tonsillectomy  Mastectomy    Family History:    Mother: Heart disease and failure    Social History:  Patient present to the ED alone.     Physical Exam     Patient Vitals for the past 24 hrs:   BP Temp Temp src Pulse Resp SpO2 Height Weight   01/08/21 2230 121/79 -- -- 77 -- 100 % -- --   01/08/21 1822 119/89 98.9  F (37.2  C) Oral 82 16 98 % 1.575 m (5' 2\") 64.9 kg (143 lb)       Physical Exam  Constitutional: Alert, uncomfortable  HENT:    Nose: Nose normal.    Mouth/Throat: Oropharynx is clear, mucous membranes are dry.   Eyes: EOM are normal. Pupils are equal, round, and reactive to light.   CV: Regular rate and rhythm, no murmurs, rubs or gallops.  Resp: Clear lungs to auscultation, all lung fields. Normal respiratory " effort.   GI: Soft, non-distended. Tenderness to epigastrum. No rebound or guarding.   MSK: Normal range of motion. No deformity.   Neurological:   A/Ox3  5/5 strength is symmetric to the upper and lower extremities;   Sensation intact to light touch throughout the upper and lower extremities;   Skin: Skin is warm and dry.    Emergency Department Course     Laboratory:  CMP: Glucose 95, Creatinine: 1.21 (H), GFR: 44 (L), o/w WNL    CBC: WBC: 4.0, HGB: 11.6 (L), PLT: 267    Lipase: 1,206 (H)    Asymptomatic Covid-9 Virus PCR: Negative     Emergency Department Course:    Reviewed:  I reviewed nursing notes, vitals, past medical history and care everywhere    Assessments:   I obtained history and examined the patient as noted above.      I rechecked the patient and explained findings.     Consults:    Spoke with Dr. Lundy, Hospitalist, who accepts for admission.     Interventions:   Dilaudid 0.5 mg IV   Zofran 4 mg IV  2219 NS 1 L IV  2314 NS 1L IV    Disposition:  The patient was admitted to the hospital under the care of Dr. Lundy.     Impression & Plan     Medical Decision Makin year old female who comes in with abdominal pain, nausea. Exam as above. Lipase at outlying facility was 400's. Now it is 1200. Clinical suspicion for pancreatitis is high. Her abdomen is non-surgical.  Dilaudid and zofran for pain and nausea. Hospitalist accepted the patient for admission.      Covid-19  Lisa Galloway was evaluated during a global COVID-19 pandemic, which necessitated consideration that the patient might be at risk for infection with the SARS-CoV-2 virus that causes COVID-19.   Applicable protocols for evaluation were followed during the patient's care.   COVID-19 was considered as part of the patient's evaluation. The plan for testing is:  a test was obtained during this visit.    Diagnosis:    ICD-10-CM    1. Acute pancreatitis, unspecified complication status, unspecified pancreatitis  type  K85.90        Scribe Disclosure:  I, Haley Vidales, am serving as a scribe at 9:13 PM on 1/8/2021 to document services personally performed by Jefferson Reinoso DO based on my observations and the provider's statements to me.     I, Karri Juarez, am serving as scribe  at 9:13 PM on 1/8/21.            Jefferson Reinoso DO  01/09/21 0033

## 2021-01-10 LAB
ANION GAP SERPL CALCULATED.3IONS-SCNC: 7 MMOL/L (ref 3–14)
BUN SERPL-MCNC: 10 MG/DL (ref 7–30)
CALCIUM SERPL-MCNC: 7.1 MG/DL (ref 8.5–10.1)
CHLORIDE SERPL-SCNC: 114 MMOL/L (ref 94–109)
CO2 SERPL-SCNC: 22 MMOL/L (ref 20–32)
CREAT SERPL-MCNC: 1.05 MG/DL (ref 0.52–1.04)
ERYTHROCYTE [DISTWIDTH] IN BLOOD BY AUTOMATED COUNT: 14.9 % (ref 10–15)
GFR SERPL CREATININE-BSD FRML MDRD: 52 ML/MIN/{1.73_M2}
GLUCOSE SERPL-MCNC: 55 MG/DL (ref 70–99)
HCT VFR BLD AUTO: 25.3 % (ref 35–47)
HGB BLD-MCNC: 8.4 G/DL (ref 11.7–15.7)
MAGNESIUM SERPL-MCNC: 1.6 MG/DL (ref 1.6–2.3)
MCH RBC QN AUTO: 36.4 PG (ref 26.5–33)
MCHC RBC AUTO-ENTMCNC: 33.2 G/DL (ref 31.5–36.5)
MCV RBC AUTO: 110 FL (ref 78–100)
PLATELET # BLD AUTO: 158 10E9/L (ref 150–450)
POTASSIUM SERPL-SCNC: 3.5 MMOL/L (ref 3.4–5.3)
RBC # BLD AUTO: 2.31 10E12/L (ref 3.8–5.2)
SODIUM SERPL-SCNC: 143 MMOL/L (ref 133–144)
WBC # BLD AUTO: 3.3 10E9/L (ref 4–11)

## 2021-01-10 PROCEDURE — 258N000003 HC RX IP 258 OP 636: Performed by: HOSPITALIST

## 2021-01-10 PROCEDURE — 120N000004 HC R&B MS OVERFLOW

## 2021-01-10 PROCEDURE — 80048 BASIC METABOLIC PNL TOTAL CA: CPT | Performed by: HOSPITALIST

## 2021-01-10 PROCEDURE — 36415 COLL VENOUS BLD VENIPUNCTURE: CPT | Performed by: HOSPITALIST

## 2021-01-10 PROCEDURE — C9113 INJ PANTOPRAZOLE SODIUM, VIA: HCPCS | Performed by: INTERNAL MEDICINE

## 2021-01-10 PROCEDURE — 250N000013 HC RX MED GY IP 250 OP 250 PS 637: Performed by: HOSPITALIST

## 2021-01-10 PROCEDURE — 85027 COMPLETE CBC AUTOMATED: CPT | Performed by: HOSPITALIST

## 2021-01-10 PROCEDURE — 83735 ASSAY OF MAGNESIUM: CPT | Performed by: HOSPITALIST

## 2021-01-10 PROCEDURE — 250N000011 HC RX IP 250 OP 636: Performed by: INTERNAL MEDICINE

## 2021-01-10 PROCEDURE — 99232 SBSQ HOSP IP/OBS MODERATE 35: CPT | Performed by: HOSPITALIST

## 2021-01-10 PROCEDURE — 99207 PR CDG-MDM COMPONENT: MEETS LOW - DOWN CODED: CPT | Performed by: HOSPITALIST

## 2021-01-10 RX ORDER — GABAPENTIN 300 MG/1
900 CAPSULE ORAL AT BEDTIME
Status: DISCONTINUED | OUTPATIENT
Start: 2021-01-10 | End: 2021-01-12 | Stop reason: HOSPADM

## 2021-01-10 RX ORDER — DONEPEZIL HYDROCHLORIDE 5 MG/1
10 TABLET, FILM COATED ORAL AT BEDTIME
Status: DISCONTINUED | OUTPATIENT
Start: 2021-01-10 | End: 2021-01-12 | Stop reason: HOSPADM

## 2021-01-10 RX ADMIN — HYDROMORPHONE HYDROCHLORIDE 0.2 MG: 1 INJECTION, SOLUTION INTRAMUSCULAR; INTRAVENOUS; SUBCUTANEOUS at 10:31

## 2021-01-10 RX ADMIN — SODIUM CHLORIDE, POTASSIUM CHLORIDE, SODIUM LACTATE AND CALCIUM CHLORIDE: 600; 310; 30; 20 INJECTION, SOLUTION INTRAVENOUS at 01:12

## 2021-01-10 RX ADMIN — HYDROMORPHONE HYDROCHLORIDE 0.2 MG: 1 INJECTION, SOLUTION INTRAMUSCULAR; INTRAVENOUS; SUBCUTANEOUS at 22:30

## 2021-01-10 RX ADMIN — SODIUM CHLORIDE, POTASSIUM CHLORIDE, SODIUM LACTATE AND CALCIUM CHLORIDE: 600; 310; 30; 20 INJECTION, SOLUTION INTRAVENOUS at 17:49

## 2021-01-10 RX ADMIN — DONEPEZIL HYDROCHLORIDE 10 MG: 5 TABLET ORAL at 22:04

## 2021-01-10 RX ADMIN — PANTOPRAZOLE SODIUM 40 MG: 40 INJECTION, POWDER, FOR SOLUTION INTRAVENOUS at 20:02

## 2021-01-10 RX ADMIN — SODIUM CHLORIDE, POTASSIUM CHLORIDE, SODIUM LACTATE AND CALCIUM CHLORIDE: 600; 310; 30; 20 INJECTION, SOLUTION INTRAVENOUS at 07:51

## 2021-01-10 RX ADMIN — GABAPENTIN 900 MG: 300 CAPSULE ORAL at 22:04

## 2021-01-10 RX ADMIN — PANTOPRAZOLE SODIUM 40 MG: 40 INJECTION, POWDER, FOR SOLUTION INTRAVENOUS at 07:52

## 2021-01-10 NOTE — CONSULTS
Consult Date:  01/09/2021      CHIEF COMPLAINT:  Abdominal pain and pancreatitis.      HISTORY OF PRESENT ILLNESS:  The patient is a 75-year-old gentleman we were asked to see by her primary care physician, Ketan Levi, for further evaluation of pancreatitis and abdominal pain.      The patient reports that she was in her usual state of health when she had the gradual onset of abdominal pain.  She describes the abdominal pain as a dull pain in her mid-abdomen to the right upper quadrant.  She is not sure if it started after eating.  She says that developed throughout the day and was accompanied by nausea.  The pain radiated to her back and when it did not improve, she presented to the ER for further evaluation.      The patient did not have any vomiting.  She did not have any diarrhea or blood in her stools.  She has not had any fever or chills.  In the ER, the patient was noted to have a lipase elevated at 1200.  Normal LFTs, normal white count and hemoglobin of 11.6.  COVID was negative.      Imaging revealed CT and ultrasound and she was diagnosed with pancreatitis.  [facility] done at [facility] showed inflammation around the pancreatic head and descending duodenum suspicious for acute pancreatitis.  No evidence of abscess, thickening of the distal esophagus representing decompressed stomach or hiatal hernia, a trace of cholelithiasis and widespread atherosclerotic metastatic disease.  Ultrasound showed cholelithiasis without convincing evidence for acute cholecystitis, possible small amount of free fluid adjacent to the gallbladder, nonspecific mild right-sided hydronephrosis and a right ureteral stent.      The patient was admitted to the hospital and kept n.p.o. with IV fluids and pain control.  At this time, she reports that her pain has decreased.  She is requesting food and liquids.  She has not taken any pain medicine today and reports it is better.      PAST MEDICAL HISTORY:  Significant for UTIs,  hydronephrosis, osteomyelitis, carcinoma of the metastatic to bone adenomatous polyp of the colon, anxiety, lymphedema, hypertension, hyperlipidemia, degenerative joint disease, GERD and depression.      PAST SURGICAL HISTORY:  Significant for tonsillectomy and mastectomy.      MEDICATIONS ON ADMISSION:  Kisqali.      She denies frequent use of aspirin or nonsteroidal anti-inflammatory agents.      FAMILY HISTORY:  Significant for heart disease in her mother.      PHYSICAL EXAMINATION:   GENERAL:  She is a well-nourished, elderly woman in no apparent distress.   VITAL SIGNS:  Her blood pressure is 100/67, pulse is 64.   VITAL SIGNS:  Temperature is 98.1.   HEENT:  Head is atraumatic, normocephalic.   SKIN:  Without evidence of jaundice or rash.  Sclerae nonicteric.   HEART:  S1, S2.   LUNGS:  Clear to auscultation.   ABDOMEN:  Soft, tender to palpation in the mid epigastric and right upper quadrant.  No hepatosplenomegaly noted.   EXTREMITIES:  Right and left lower extremity without edema.   NEUROLOGIC:  She is alert and oriented, grossly is nonfocal.   PSYCHIATRIC:  No evidence of pressured speech or flattened affect.      LABORATORY DATA:  Her repeat lipase is 1538, triglycerides are normal.  Her white blood cell count is normal, hemoglobin 8.4, BUN is 13 with a creatinine of 1.07.      ASSESSMENT AND PLAN:   Acute pancreatitis.  The differential etiology of acute pancreatitis includes gallstones, medications, hypertriglyceridemia, ischemia.  At this time, the patient does have gallstones and it is possible she has a gallstone pancreatitis, although her liver functions are normal makes this much less likely at this time, she will receive supportive care and she will follow-up with EUS, ERCP for further evaluation once the pancreatitis has resolved.  She will be kept n.p.o. tonight and start sips of water tomorrow.  We will monitor her lipase and she will be given adequate pain medications.      Thank you for  asking us to participate in her care.  We will continue to follow this patient in the hospital.         BETO YOUNGBLOOD MD             D: 2021   T: 2021   MT:       Name:     NATALIE LIRA   MRN:      [id number]        Account:       [id number]   :      1945           Consult Date:  2021      Document: [id number]       cc: [facility]

## 2021-01-10 NOTE — PROGRESS NOTES
"GASTROENTEROLOGY PROGRESS NOTE    CC: Pancretitis    SUBJECTIVE:  Pain this morning but feels good now  Requesting diet    OBJECTIVE:  General Appearance:  Wn WD in NAD  /68   Pulse 72   Temp 98  F (36.7  C) (Oral)   Resp 16   Ht 1.575 m (5' 2\")   Wt 64 kg (141 lb)   SpO2 95%   Breastfeeding No   BMI 25.79 kg/m    Temp (24hrs), Av.2  F (36.8  C), Min:98  F (36.7  C), Max:98.6  F (37  C)    Patient Vitals for the past 72 hrs:   Weight   21 0044 64 kg (141 lb)   21 1822 64.9 kg (143 lb)       Intake/Output Summary (Last 24 hours) at 1/10/2021 1459  Last data filed at 1/10/2021 1100  Gross per 24 hour   Intake 2262.5 ml   Output 750 ml   Net 1512.5 ml       PHYSICAL EXAM  Abdomen: Abdomen soft, non-tender. BS normal. No masses, organomegaly        Additional Comments:  ROS, FH, SH: See initial GI consult for details.    I have reviewed the patient's new clinical lab results:    Recent Labs   Lab Test 01/10/21  0613 21  0648 21  2218   WBC 3.3* 3.0* 4.0   HGB 8.4* 8.4* 11.6*   * 109* 106*    192 267     Recent Labs   Lab Test 01/10/21  0613 21  0648 21  0119 21  2218   POTASSIUM 3.5 3.6 3.9 3.7   CHLORIDE 114* 115*  --  104   CO2 22 23  --  25   BUN 10 13  --  15   ANIONGAP 7 5  --  9     Recent Labs   Lab Test 21  0648 21  2218   ALBUMIN  --  3.7   BILITOTAL  --  0.7   ALT  --  9   AST  --  20   LIPASE 1,538* 1,206*         Active Problems:    Acute pancreatitis, unspecified complication status, unspecified pancreatitis type    Assessment: Appears improved    Plan: Start clear liquids   Lipase in AM   Outpatient EUS will be scheduled at discharge      Denise Taylor MD  [facility]  Office       "

## 2021-01-10 NOTE — PLAN OF CARE
Vital Signs: VSS  Pain/Comfort: denies pain. States its more discomfort but denies need for pain meds. Heating pad, rest and walking utilized to help with discomfort  Assessment: WDL except abdominal pain noted when abdomen palpated  Diet: NPO except ice chips. Tolerating ice chips well  Output: voiding well, no gas or stool  Activity/Ambulation: up with SBA. Ambulated in room and in hallway  Plan: Will continue to keep NPO except ice chips overnight. [facility] said okay to try sips of clears in the AM. Will continue IVF. Will continue to monitor and provide supportive cares as needed

## 2021-01-10 NOTE — PROGRESS NOTES
[facility]    Hospitalist Progress Note  Name: Lisa Galloway    MRN: [id number]  Provider:  Ketan Levi DO, MPH  Date of Service: 01/10/2021    Summary of Stay: Lisa Galloway is a 75 year old female with a history of static breast cancer with osseous metastases noted on recent PET scan admitted on 1/8/2021 with abdominal pain thought due to pancreatitis and cholelithiasis and peptic ulcer disease.  Also has acute kidney injury with creatinine improving with IV fluids.  Started on IV PPI, bowel rest, and IV fluids with GI consulted.    Problem List:   1. Acute pancreatitis: No reported alcohol use.  Some cholelithiasis noted on ultrasound but no obstructing stone.  I suspect she probably had gallstone pancreatitis.  Triglycerides normal.  Continue lactated Ringer decrease rate to 125 cc/h.  Continue n.p.o. status but with sips and follow-up GI recommendations.  Continue analgesics and antiemetics as needed.  EUS as outpatient.  2. Acute kidney injury: Mild hydronephrosis on imaging of the right with a ureteral stent not well visualized.  Creatinine is improving.  Continue fluids.  Hold off on further imaging or urology intervention for the stent.  3. GERD and peptic ulcer disease: Continue IV PPI.  Gastroenterology consulted.  4. Asthma: No evidence of exacerbation.  Albuterol MDI as needed.  5. Anxiety: Currently calm.  6. Metastatic breast cancer: PET scan in December from [place] showed increasing and new activity in the skeletal lesions and new extraosseous metastatic involvement.  Followed by oncology.  No indication for inpatient consult.    DVT Prophylaxis: Pneumatic Compression Devices  Code Status: No CPR- Do NOT Intubate  Diet: NPO for Medical/Clinical Reasons Except for: Ice Chips, Other, Meds; Specify: start sips of water in AM    Yo Catheter: not present  Disposition: Expected discharge in 2 days to home. Goals prior to discharge include manage pancreatitis.    Incidental Findings: As above.  Family updated today: Yes.     Interval History   The patient reports doing well. No chest pain or shortness of breath. No nausea, vomiting, diarrhea, constipation. Still some diffuse abdominal pain but better. No fevers. No other specific complaints identified.     -Data reviewed today: I personally reviewed all new labs and imaging results over the last 24 hours.     Physical Exam   Temp: 98  F (36.7  C) Temp src: Oral BP: 126/68 Pulse: 70   Resp: 18 SpO2: 94 % O2 Device: None (Room air)    Vitals:    01/08/21 1822 01/09/21 0044   Weight: 64.9 kg (143 lb) 64 kg (141 lb)     Vital Signs with Ranges  Temp:  [98  F (36.7  C)-98.6  F (37  C)] 98  F (36.7  C)  Pulse:  [64-71] 70  Resp:  [16-18] 18  BP: (100-126)/(58-68) 126/68  SpO2:  [94 %-96 %] 94 %  I/O last 3 completed shifts:  In: 3349.17 [I.V.:3349.17]  Out: 450 [Urine:450]    GENERAL: No apparent distress. Awake, alert, and fully oriented.  HEENT: Normocephalic, atraumatic. Extraocular movements intact.  CARDIOVASCULAR: Regular rate and rhythm without murmurs or rubs. No S3.  PULMONARY: Clear bilaterally.  GASTROINTESTINAL: Soft, tender, non-distended. Bowel sounds normoactive.   EXTREMITIES: No cyanosis or clubbing. No edema.  NEUROLOGICAL: CN 2-12 grossly intact, no focal neurological deficits.  DERMATOLOGICAL: No rash, ulcer, bruising, nor jaundice.     Medications     lactated ringers 125 mL/hr at 01/10/21 0752       donepezil  10 mg Oral At Bedtime     gabapentin  900 mg Oral At Bedtime     pantoprazole (PROTONIX) IV  40 mg Intravenous BID     sodium chloride (PF)  3 mL Intracatheter Q8H     Data     Laboratory:  Recent Labs   Lab 01/10/21  0613 01/09/21  0648 01/08/21  2218   WBC 3.3* 3.0* 4.0   HGB 8.4* 8.4* 11.6*   HCT 25.3* 25.2* 34.1*   * 109* 106*    192 267     Recent Labs   Lab 01/10/21  0613 01/09/21  0648 01/09/21  0119 01/08/21  2218    143  --  138   POTASSIUM 3.5 3.6 3.9 3.7   CHLORIDE 114*  115*  --  104   CO2 22 23  --  25   ANIONGAP 7 5  --  9   GLC 55* 123*  --  95   BUN 10 13  --  15   CR 1.05* 1.07*  --  1.21*   GFRESTIMATED 52* 50*  --  44*   GFRESTBLACK 60* 59*  --  50*   EARL 7.1* 7.6*  --  9.5     Recent Labs   Lab 01/08/21  2218   AST 20   ALT 9   ALKPHOS 84   BILITOTAL 0.7     Recent Labs   Lab 01/09/21  0648 01/08/21  2218   LIPASE 1,538* 1,206*     No results for input(s): CULT in the last 168 hours.    Imaging:  No results found for this or any previous visit (from the past 24 hour(s)).      Ketan Levi DO MPH  [facility] Hospitalist  [address] [place] [address]  Pager: [phone]  01/10/2021

## 2021-01-10 NOTE — PLAN OF CARE
Last evening right sided abdominal pain increased to 4. Dilaudid 0.2 Mg given with gradual relief. Has an occasional ice chip. Bowel sounds are hypoactive.  Denied nausea. Asks if she can eat yet forgetting she is NPO. No BM. Gait is stronger but slightly unsteady so remains on falls precaution with standby assist. IV is running at 150 ml/hr. Voiding small amounts of johnnie colored urine.Exzema noted on left palm. Will have AM lab draws. And GI note says she can have sips of water this AM. She is [place] and hearing aids are out.

## 2021-01-11 LAB
LIPASE SERPL-CCNC: 646 U/L (ref 73–393)
MAGNESIUM SERPL-MCNC: 1.4 MG/DL (ref 1.6–2.3)
MAGNESIUM SERPL-MCNC: 1.5 MG/DL (ref 1.6–2.3)
POTASSIUM SERPL-SCNC: 3.6 MMOL/L (ref 3.4–5.3)

## 2021-01-11 PROCEDURE — 250N000013 HC RX MED GY IP 250 OP 250 PS 637: Performed by: HOSPITALIST

## 2021-01-11 PROCEDURE — 99232 SBSQ HOSP IP/OBS MODERATE 35: CPT | Performed by: HOSPITALIST

## 2021-01-11 PROCEDURE — 258N000003 HC RX IP 258 OP 636: Performed by: HOSPITALIST

## 2021-01-11 PROCEDURE — 36415 COLL VENOUS BLD VENIPUNCTURE: CPT | Performed by: INTERNAL MEDICINE

## 2021-01-11 PROCEDURE — 83690 ASSAY OF LIPASE: CPT | Performed by: INTERNAL MEDICINE

## 2021-01-11 PROCEDURE — C9113 INJ PANTOPRAZOLE SODIUM, VIA: HCPCS | Performed by: INTERNAL MEDICINE

## 2021-01-11 PROCEDURE — 84132 ASSAY OF SERUM POTASSIUM: CPT | Performed by: INTERNAL MEDICINE

## 2021-01-11 PROCEDURE — 83735 ASSAY OF MAGNESIUM: CPT | Performed by: INTERNAL MEDICINE

## 2021-01-11 PROCEDURE — 250N000011 HC RX IP 250 OP 636: Performed by: INTERNAL MEDICINE

## 2021-01-11 PROCEDURE — 99207 PR CDG-MDM COMPONENT: MEETS LOW - DOWN CODED: CPT | Performed by: HOSPITALIST

## 2021-01-11 PROCEDURE — 120N000004 HC R&B MS OVERFLOW

## 2021-01-11 RX ORDER — MAGNESIUM SULFATE HEPTAHYDRATE 40 MG/ML
2 INJECTION, SOLUTION INTRAVENOUS ONCE
Status: COMPLETED | OUTPATIENT
Start: 2021-01-11 | End: 2021-01-11

## 2021-01-11 RX ADMIN — HYDROMORPHONE HYDROCHLORIDE 0.2 MG: 1 INJECTION, SOLUTION INTRAMUSCULAR; INTRAVENOUS; SUBCUTANEOUS at 10:17

## 2021-01-11 RX ADMIN — GABAPENTIN 900 MG: 300 CAPSULE ORAL at 20:16

## 2021-01-11 RX ADMIN — SODIUM CHLORIDE, POTASSIUM CHLORIDE, SODIUM LACTATE AND CALCIUM CHLORIDE: 600; 310; 30; 20 INJECTION, SOLUTION INTRAVENOUS at 00:41

## 2021-01-11 RX ADMIN — PANTOPRAZOLE SODIUM 40 MG: 40 INJECTION, POWDER, FOR SOLUTION INTRAVENOUS at 20:15

## 2021-01-11 RX ADMIN — PANTOPRAZOLE SODIUM 40 MG: 40 INJECTION, POWDER, FOR SOLUTION INTRAVENOUS at 08:27

## 2021-01-11 RX ADMIN — HYDROMORPHONE HYDROCHLORIDE 0.2 MG: 1 INJECTION, SOLUTION INTRAMUSCULAR; INTRAVENOUS; SUBCUTANEOUS at 00:36

## 2021-01-11 RX ADMIN — SODIUM CHLORIDE, POTASSIUM CHLORIDE, SODIUM LACTATE AND CALCIUM CHLORIDE: 600; 310; 30; 20 INJECTION, SOLUTION INTRAVENOUS at 10:24

## 2021-01-11 RX ADMIN — MAGNESIUM SULFATE HEPTAHYDRATE 2 G: 40 INJECTION, SOLUTION INTRAVENOUS at 18:41

## 2021-01-11 RX ADMIN — HYDROMORPHONE HYDROCHLORIDE 0.2 MG: 1 INJECTION, SOLUTION INTRAMUSCULAR; INTRAVENOUS; SUBCUTANEOUS at 20:28

## 2021-01-11 RX ADMIN — ONDANSETRON 4 MG: 2 INJECTION INTRAMUSCULAR; INTRAVENOUS at 00:36

## 2021-01-11 RX ADMIN — SODIUM CHLORIDE, POTASSIUM CHLORIDE, SODIUM LACTATE AND CALCIUM CHLORIDE: 600; 310; 30; 20 INJECTION, SOLUTION INTRAVENOUS at 21:40

## 2021-01-11 RX ADMIN — DONEPEZIL HYDROCHLORIDE 10 MG: 5 TABLET ORAL at 20:16

## 2021-01-11 RX ADMIN — HYDROMORPHONE HYDROCHLORIDE 0.2 MG: 1 INJECTION, SOLUTION INTRAMUSCULAR; INTRAVENOUS; SUBCUTANEOUS at 13:22

## 2021-01-11 NOTE — PROGRESS NOTES
[facility]    Hospitalist Progress Note  Name: Lisa Galloway    MRN: [id number]  Provider:  Ketan Levi DO, MPH  Date of Service: 01/11/2021    Summary of Stay: Lisa Galloway is a 75 year old female with a history of static breast cancer with osseous metastases noted on recent PET scan admitted on 1/8/2021 with abdominal pain thought due to pancreatitis and cholelithiasis and peptic ulcer disease.  Also has acute kidney injury with creatinine improving with IV fluids.  Started on IV PPI, bowel rest, and IV fluids with GI consulted.    Problem List:   1. Acute pancreatitis: No reported alcohol use.  Some cholelithiasis noted on ultrasound but no obstructing stone.  I suspect she probably had gallstone pancreatitis.  Triglycerides normal.  Continue lactated Ringer decrease rate to 100 cc/h.  Stay on CLD and follow-up GI recommendations.  Continue analgesics and antiemetics as needed.  EUS as outpatient.  2. Acute kidney injury: Mild hydronephrosis on imaging of the right with a ureteral stent not well visualized.  Creatinine is improving.  Continue fluids.  Hold off on further imaging or urology intervention for the stent.  3. GERD and peptic ulcer disease: Continue IV PPI.  Gastroenterology consulted.  4. Asthma: No evidence of exacerbation.  Albuterol MDI as needed.  5. Anxiety: Currently calm.  6. Metastatic breast cancer: PET scan in December from [facility] showed increasing and new activity in the skeletal lesions and new extraosseous metastatic involvement.  Followed by oncology.  No indication for inpatient consult.    DVT Prophylaxis: Pneumatic Compression Devices  Code Status: No CPR- Do NOT Intubate  Diet: Clear Liquid Diet    Yo Catheter: not present  Disposition: Expected discharge in 1-2 days to home. Goals prior to discharge include manage pancreatitis.   Incidental Findings: As above.  Family updated today: Yes.     Interval History   The patient reports doing well.  No chest pain or shortness of breath. No nausea, vomiting, diarrhea, constipation. Still some diffuse abdominal pain but only mildly better. No fevers. No other specific complaints identified.     -Data reviewed today: I personally reviewed all new labs and imaging results over the last 24 hours.     Physical Exam   Temp: 97.9  F (36.6  C) Temp src: Oral BP: 134/73 Pulse: 72   Resp: 16 SpO2: 95 % O2 Device: None (Room air) Oxygen Delivery: 1 LPM  Vitals:    01/08/21 1822 01/09/21 0044   Weight: 64.9 kg (143 lb) 64 kg (141 lb)     Vital Signs with Ranges  Temp:  [97.9  F (36.6  C)-98.4  F (36.9  C)] 97.9  F (36.6  C)  Pulse:  [61-72] 72  Resp:  [16-20] 16  BP: (127-157)/(68-90) 134/73  Cuff Mean (mmHg):  [103] 103  SpO2:  [86 %-98 %] 95 %  I/O last 3 completed shifts:  In: 4428.42 [P.O.:410; I.V.:4018.42]  Out: 925 [Urine:925]    GENERAL: No apparent distress. Awake, alert, and fully oriented.  HEENT: Normocephalic, atraumatic. Extraocular movements intact.  CARDIOVASCULAR: Regular rate and rhythm without murmurs or rubs. No S3.  PULMONARY: Clear bilaterally.  GASTROINTESTINAL: Soft, tender, non-distended. Bowel sounds normoactive.   EXTREMITIES: No cyanosis or clubbing. No edema.  NEUROLOGICAL: CN 2-12 grossly intact, no focal neurological deficits.  DERMATOLOGICAL: No rash, ulcer, bruising, nor jaundice.     Medications     lactated ringers 100 mL/hr at 01/11/21 0831       donepezil  10 mg Oral At Bedtime     gabapentin  900 mg Oral At Bedtime     pantoprazole (PROTONIX) IV  40 mg Intravenous BID     sodium chloride (PF)  3 mL Intracatheter Q8H     Data     Laboratory:  Recent Labs   Lab 01/10/21  0613 01/09/21  0648 01/08/21  2218   WBC 3.3* 3.0* 4.0   HGB 8.4* 8.4* 11.6*   HCT 25.3* 25.2* 34.1*   * 109* 106*    192 267     Recent Labs   Lab 01/11/21  0619 01/10/21  0613 01/09/21  0648 01/08/21 2218 01/08/21 2218   NA  --  143 143  --  138   POTASSIUM 3.6 3.5 3.6   < > 3.7   CHLORIDE  --  114* 115*   --  104   CO2  --  22 23  --  25   ANIONGAP  --  7 5  --  9   GLC  --  55* 123*  --  95   BUN  --  10 13  --  15   CR  --  1.05* 1.07*  --  1.21*   GFRESTIMATED  --  52* 50*  --  44*   GFRESTBLACK  --  60* 59*  --  50*   EARL  --  7.1* 7.6*  --  9.5    < > = values in this interval not displayed.     Recent Labs   Lab 01/08/21 2218   AST 20   ALT 9   ALKPHOS 84   BILITOTAL 0.7     Recent Labs   Lab 01/11/21  0619 01/09/21  0648 01/08/21  2218   LIPASE 646* 1,538* 1,206*     No results for input(s): CULT in the last 168 hours.    Imaging:  No results found for this or any previous visit (from the past 24 hour(s)).      Ketan Levi DO MPH  [facility] Hospitalist  [address] [place] [address]  Pager: [phone]  01/11/2021

## 2021-01-11 NOTE — PROGRESS NOTES
"GASTROENTEROLOGY PROGRESS NOTE        SUBJECTIVE:  Abdominal pain slow to improve, reporting some right hip and RLQ discomfort. No nausea or vomiting . No fever. Passing flatus.      OBJECTIVE:    /73   Pulse 72   Temp 97.9  F (36.6  C) (Oral)   Resp 16   Ht 1.575 m (5' 2\")   Wt 64 kg (141 lb)   SpO2 96%   Breastfeeding No   BMI 25.79 kg/m    Temp (24hrs), Av.1  F (36.7  C), Min:97.9  F (36.6  C), Max:98.4  F (36.9  C)    Patient Vitals for the past 72 hrs:   Weight   21 0044 64 kg (141 lb)   21 1822 64.9 kg (143 lb)       Intake/Output Summary (Last 24 hours) at 2021 1115  Last data filed at 2021 0547  Gross per 24 hour   Intake 4398.42 ml   Output 625 ml   Net 3773.42 ml        PHYSICAL EXAM     Constitutional: NAD  Abdomen: soft, mild RUQ and epigastric TTP      Additional Comments:  ROS, FH, SH: See initial GI consult for details.     I have reviewed the patient's new clinical lab results:     Recent Labs   Lab Test 01/10/21  0613 01/09/21  0648 01/08/21  2218   WBC 3.3* 3.0* 4.0   HGB 8.4* 8.4* 11.6*   * 109* 106*    192 267     Recent Labs   Lab Test 01/11/21  0619 01/10/21  0613 01/09/21  0648 01/08/21  2218 01/08/21  2218   POTASSIUM 3.6 3.5 3.6   < > 3.7   CHLORIDE  --  114* 115*  --  104   CO2  --  22 23  --  25   BUN  --  10 13  --  15   ANIONGAP  --  7 5  --  9    < > = values in this interval not displayed.     Recent Labs   Lab Test 21   ALBUMIN  --   --  3.7   BILITOTAL  --   --  0.7   ALT  --   --  9   AST  --   --  20   LIPASE 646* 1,538* 1,206*        ASSESSMENT/ PLAN  Lisa Galloway is a 75 year old female with metastatic breast cancer (bone metastases noted on recent PET scan) admitted on 2021 with abdominal pain found to have acute pancreatitis.     1. Acute pancreatitis: evidence on CT imaging with elevated lipase, now improving. Suspect secondary to passage of a gallstone given US with " cholelithiasis. No obstructing stones seen on imaging. LFTs normal on 1/8. No fever or leukocytosis. TGs normal.   -- Advance diet as tolerated.  -- Continue IV fluids, pain control and antiemetics.  -- Would suggest general surgery consult as outpatient given suspicion for gallstone pancreatitis.      2. Metastatic breast cancer: follow by outpatient oncology    Discussed with Dr. Gonzales Montilla, FRIEDA  [facility] ( [facility])

## 2021-01-11 NOTE — PLAN OF CARE
Vital Signs: Maintaining sats on RA.   Pain/Comfort: C/O abdominal pain that radiates to the back; received Dilaudid and heat with relief.   Assessment: Abdomen tender in RUQ and RLQ. Denies nausea.  Diet: Clear liquids; slight increase in pain following.   Output: Intact.   Activity/Ambulation: Ambulated to and from bathroom times three.   Social: Pleasant with RN.   Plan: IVF. Pain control. Encourage ambulation. GI consulting. Monitor and provide for needs.

## 2021-01-11 NOTE — PLAN OF CARE
Nurse from [id number]    End of Shift Note  See flowsheets for vital signs and complete assessments.    Pertinent Assessments: A&Ox4, up with SBA. Pain in RLQ, using heat pad and given IV dilaudid. Denies nausea, SOB, numbness/tingling. Voiding frequently with adequate output.     Major Shift Events: None    Treatment Plan: IV protonix, IVF, diet advancement    Discharge: 2 days per MD note    Bedside RN: Esperanza Villaseñor

## 2021-01-11 NOTE — PLAN OF CARE
Vital Signs: VSS  Pain/Comfort: rated pain 4/10 one time. Dilaudid administered. Since administration, has denies any pain.  Assessment: WDL except abdomen tender.   Diet: Started as NPO except ice chips and has since, advanced to clears. Tolerating clear liquids well  Output: frequent voiding--100ml Q2H, no BM yet  Activity/Ambulation: up with SBA  Plan: Will continue IVF. Will continue to assess pain with clear liquid diet. Plan to draw lipase levels tomorrow morning. Will continue to monitor and provide supportive cares as needed.

## 2021-01-12 VITALS
HEART RATE: 69 BPM | DIASTOLIC BLOOD PRESSURE: 69 MMHG | RESPIRATION RATE: 16 BRPM | TEMPERATURE: 98 F | OXYGEN SATURATION: 93 % | SYSTOLIC BLOOD PRESSURE: 123 MMHG | WEIGHT: 141 LBS | HEIGHT: 62 IN | BODY MASS INDEX: 25.95 KG/M2

## 2021-01-12 LAB — MAGNESIUM SERPL-MCNC: 2 MG/DL (ref 1.6–2.3)

## 2021-01-12 PROCEDURE — 36415 COLL VENOUS BLD VENIPUNCTURE: CPT | Performed by: INTERNAL MEDICINE

## 2021-01-12 PROCEDURE — C9113 INJ PANTOPRAZOLE SODIUM, VIA: HCPCS | Performed by: INTERNAL MEDICINE

## 2021-01-12 PROCEDURE — 83735 ASSAY OF MAGNESIUM: CPT | Performed by: INTERNAL MEDICINE

## 2021-01-12 PROCEDURE — 258N000003 HC RX IP 258 OP 636: Performed by: HOSPITALIST

## 2021-01-12 PROCEDURE — 99239 HOSP IP/OBS DSCHRG MGMT >30: CPT | Performed by: HOSPITALIST

## 2021-01-12 PROCEDURE — 250N000011 HC RX IP 250 OP 636: Performed by: INTERNAL MEDICINE

## 2021-01-12 PROCEDURE — 250N000013 HC RX MED GY IP 250 OP 250 PS 637: Performed by: HOSPITALIST

## 2021-01-12 RX ORDER — ACETAMINOPHEN 325 MG/1
650 TABLET ORAL EVERY 4 HOURS PRN
Status: DISCONTINUED | OUTPATIENT
Start: 2021-01-12 | End: 2021-01-12 | Stop reason: HOSPADM

## 2021-01-12 RX ADMIN — SODIUM CHLORIDE, POTASSIUM CHLORIDE, SODIUM LACTATE AND CALCIUM CHLORIDE: 600; 310; 30; 20 INJECTION, SOLUTION INTRAVENOUS at 07:03

## 2021-01-12 RX ADMIN — ACETAMINOPHEN 650 MG: 325 TABLET, FILM COATED ORAL at 15:10

## 2021-01-12 RX ADMIN — PANTOPRAZOLE SODIUM 40 MG: 40 INJECTION, POWDER, FOR SOLUTION INTRAVENOUS at 08:04

## 2021-01-12 NOTE — PROGRESS NOTES
"GASTROENTEROLOGY PROGRESS NOTE        SUBJECTIVE:  Abdominal pain slowly improving, no nausea or vomiting. Little appetite.  No fever.      OBJECTIVE:    /79 (BP Location: Left arm)   Pulse 66   Temp 97.8  F (36.6  C) (Oral)   Resp 16   Ht 1.575 m (5' 2\")   Wt 64 kg (141 lb)   SpO2 92%   Breastfeeding No   BMI 25.79 kg/m    Temp (24hrs), Av.1  F (36.7  C), Min:97.9  F (36.6  C), Max:98.4  F (36.9  C)    No data found.    Intake/Output Summary (Last 24 hours) at 2021 1115  Last data filed at 2021 0547  Gross per 24 hour   Intake 4398.42 ml   Output 625 ml   Net 3773.42 ml        PHYSICAL EXAM     Constitutional: NAD  Abdomen: soft, mild RUQ       Additional Comments:  ROS, FH, SH: See initial GI consult for details.     I have reviewed the patient's new clinical lab results:     Recent Labs   Lab Test 01/10/21  0613 01/09/21  0648 21   WBC 3.3* 3.0* 4.0   HGB 8.4* 8.4* 11.6*   * 109* 106*    192 267     Recent Labs   Lab Test 01/11/21  0619 01/10/21  0613 01/09/21  0648 01/08/21  2218 01/08/21  2218   POTASSIUM 3.6 3.5 3.6   < > 3.7   CHLORIDE  --  114* 115*  --  104   CO2  --  22 23  --  25   BUN  --  10 13  --  15   ANIONGAP  --  7 5  --  9    < > = values in this interval not displayed.     Recent Labs   Lab Test 21  0648 21   ALBUMIN  --   --  3.7   BILITOTAL  --   --  0.7   ALT  --   --  9   AST  --   --  20   LIPASE 646* 1,538* 1,206*        ASSESSMENT/ PLAN  Lisa Galloway is a 75 year old female with metastatic breast cancer (bone metastases noted on recent PET scan) admitted on 2021 with abdominal pain found to have acute pancreatitis.     1. Acute pancreatitis: evidence on CT imaging with elevated lipase, now improving. Suspect secondary to passage of a gallstone given US with cholelithiasis. No obstructing stones seen on imaging. LFTs normal on . No fever or leukocytosis. TGs normal.   -- Advance diet as " tolerated. Encourage diet  -- Continue IV fluids, pain control and antiemetics.  -- Would suggest general surgery consult as outpatient given suspicion for gallstone pancreatitis.      2. Metastatic breast cancer: follow by outpatient oncology    Discussed with Dr. Rolon. Stable for discharge once tolerating diet.   Emmanuelle Montilla PA-C  [facility] ( [facility])

## 2021-01-12 NOTE — PROGRESS NOTES
Vital Signs: WNL. Patient afebrile.   Pain/Comfort: Patient rating pain as a 3/10. PRN pain medications given per MAR. Patient encouraged to call this RN if pain starts increasing. Patient stated an understanding.   Assessment: PIV c/d/i with IVF infusing per MAR.   Diet: patient tolerating clears.   Output: patient voiding  Activity/Ambulation: patient up with stand-by assist. Patient encouraged to call this RN prior to getting out of bed. Patient stated an understanding. Fall precautions maintained.   Social: patient calm and cooperative.   Plan: Pain control. Maintain PIV. IVF. Mag 1.5. IV replacement per protocol.

## 2021-01-12 NOTE — PLAN OF CARE
Pt up with 1 assist to bathroom. Holds onto bed and walls when walking. Flat affect and tired today. Pain not an issue today. IV SL this am and encouraged po. Really no appetite for both breakfast and lunch. MD updated and will hold discharge for now and reassess later this afternoon. Lungs diminished. Will monitor.

## 2021-01-12 NOTE — PROGRESS NOTES
CLINICAL NUTRITION SERVICES - REASSESSMENT NOTE      Recommendations Ordered by Registered Dietitian (RD):   - Chocolate Boost Plus PRN  - Low Fat diet handout    Malnutrition: (1/12)  % Weight Loss:  None noted  % Intake:  </= 50% for >/= 5 days (severe malnutrition) -- mostly this admission   Subcutaneous Fat Loss:  None observed   Muscle Loss:  Temporal region mild depletion, Clavicle bone region mild depletion and Acromion bone region mild depletion  Fluid Retention: None documented    Malnutrition Diagnosis: Non-Severe malnutrition  In Context of:  Acute on Chronic illness or injury       EVALUATION OF PROGRESS TOWARD GOALS   Diet:  Low Fat    Intake/Tolerance/New Findings:  Diet just advanced from CL to low fat this afternoon. Pt continues to have a decreased appetite and plans to discharge once able to tolerate new diet. Writer offered Boost trial this afternoon for added protein/calories and reviewed low fat diet guidelines     Vitals:    01/08/21 1822 01/09/21 0044   Weight: 64.9 kg (143 lb) 64 kg (141 lb)         ASSESSED NUTRITION NEEDS:  Dosing Weight 64  Estimated Energy Needs: 25-30 Kcal/Kg  Justification: maintenance  Estimated Protein Needs: 1.2-1.5 g pro/Kg  Justification: preservation of lean body mass, age  Estimated Fluid Needs: per MD      MALNUTRITION  % Weight Loss:  None noted  % Intake:  </= 50% for >/= 5 days (severe malnutrition) -- mostly this admission   Subcutaneous Fat Loss:  None observed   Muscle Loss:  Temporal region mild depletion, Clavicle bone region mild depletion and Acromion bone region mild depletion  Fluid Retention: None documented    Malnutrition Diagnosis: Non-Severe malnutrition  In Context of:  Acute on Chronic illness or injury    Previous Goals:   Diet advancement past NPO w/in 48-72 hrs.   Evaluation: Met    Previous Nutrition Diagnosis:   Inadequate oral intake related to altered GI function as evidenced by meeting 0% needs orally since admit with NPO  status.  Evaluation: No change      CURRENT NUTRITION DIAGNOSIS  Inadequate oral intake related to altered GI function and decreased appetite as evidenced by recent diet advancement >CL x1 day with minimal PO intake recorded     INTERVENTIONS  Recommendations / Nutrition Prescription  Diet per MD  Boost as needed     Implementation  Medical Food Supplement: as above   Nutrition Education: reviewed low fat dietary guidelines and provided handout     Goals  Pt to tolerant >50% meals TID      MONITORING AND EVALUATION:  Progress towards goals will be monitored and evaluated per protocol and Practice Guidelines      Tiarra Rockwell RD, LD  Clinical Dietitian

## 2021-01-12 NOTE — PLAN OF CARE
Shift Summary [id number]-  Patient has slept well throughout shift, waking x2 to ambulate to bathroom and void. Patient reporting right abdominal pain 2, but declining medication as she verbalizes pain is tolerable. Patient using aqua k pad to abdomen for comfort. Vital sign stable and afebrile. O2 sats initially 88% on room air when drifting in/out of sleep, but when asked to take deep breaths and pulse oximeter moved to another finger, O2 sats 92% on room air. Please refer to flowsheets for further assessments/data. Plan to continue to monitor patients comfort level, tolerance to ordered diet, and intake/output.

## 2021-01-13 ENCOUNTER — APPOINTMENT (OUTPATIENT)
Dept: ULTRASOUND IMAGING | Facility: CLINIC | Age: 76
DRG: 374 | End: 2021-01-13
Attending: EMERGENCY MEDICINE
Payer: MEDICARE

## 2021-01-13 ENCOUNTER — HOSPITAL ENCOUNTER (INPATIENT)
Facility: CLINIC | Age: 76
LOS: 9 days | Discharge: HOME | DRG: 374 | End: 2021-01-22
Attending: EMERGENCY MEDICINE | Admitting: INTERNAL MEDICINE
Payer: MEDICARE

## 2021-01-13 DIAGNOSIS — I24.9 ACS (ACUTE CORONARY SYNDROME) (H): ICD-10-CM

## 2021-01-13 DIAGNOSIS — R10.31 RLQ ABDOMINAL PAIN: ICD-10-CM

## 2021-01-13 DIAGNOSIS — K85.90 ACUTE PANCREATITIS, UNSPECIFIED COMPLICATION STATUS, UNSPECIFIED PANCREATITIS TYPE: ICD-10-CM

## 2021-01-13 LAB
ALBUMIN SERPL-MCNC: 2.5 G/DL (ref 3.4–5)
ALBUMIN UR-MCNC: 20 MG/DL
ALP SERPL-CCNC: 57 U/L (ref 40–150)
ALT SERPL W P-5'-P-CCNC: 12 U/L (ref 0–50)
ANION GAP SERPL CALCULATED.3IONS-SCNC: 5 MMOL/L (ref 3–14)
APPEARANCE UR: CLEAR
APTT PPP: 34 SEC (ref 22–37)
AST SERPL W P-5'-P-CCNC: 31 U/L (ref 0–45)
BACTERIA #/AREA URNS HPF: ABNORMAL /HPF
BASOPHILS # BLD AUTO: 0 10E9/L (ref 0–0.2)
BASOPHILS NFR BLD AUTO: 1.3 %
BILIRUB SERPL-MCNC: 0.5 MG/DL (ref 0.2–1.3)
BILIRUB UR QL STRIP: NEGATIVE
BUN SERPL-MCNC: 6 MG/DL (ref 7–30)
CALCIUM SERPL-MCNC: 8.3 MG/DL (ref 8.5–10.1)
CHLORIDE SERPL-SCNC: 111 MMOL/L (ref 94–109)
CO2 SERPL-SCNC: 26 MMOL/L (ref 20–32)
COLOR UR AUTO: ABNORMAL
CREAT SERPL-MCNC: 0.99 MG/DL (ref 0.52–1.04)
DIFFERENTIAL METHOD BLD: ABNORMAL
EOSINOPHIL # BLD AUTO: 0 10E9/L (ref 0–0.7)
EOSINOPHIL NFR BLD AUTO: 0.9 %
ERYTHROCYTE [DISTWIDTH] IN BLOOD BY AUTOMATED COUNT: 14.3 % (ref 10–15)
GFR SERPL CREATININE-BSD FRML MDRD: 56 ML/MIN/{1.73_M2}
GLUCOSE SERPL-MCNC: 94 MG/DL (ref 70–99)
GLUCOSE UR STRIP-MCNC: NEGATIVE MG/DL
HCT VFR BLD AUTO: 29 % (ref 35–47)
HGB BLD-MCNC: 9.9 G/DL (ref 11.7–15.7)
HGB UR QL STRIP: NEGATIVE
IMM GRANULOCYTES # BLD: 0 10E9/L (ref 0–0.4)
IMM GRANULOCYTES NFR BLD: 0.3 %
KETONES UR STRIP-MCNC: 10 MG/DL
LABORATORY COMMENT REPORT: NORMAL
LEUKOCYTE ESTERASE UR QL STRIP: ABNORMAL
LIPASE SERPL-CCNC: 747 U/L (ref 73–393)
LYMPHOCYTES # BLD AUTO: 0.6 10E9/L (ref 0.8–5.3)
LYMPHOCYTES NFR BLD AUTO: 19.4 %
MCH RBC QN AUTO: 36.1 PG (ref 26.5–33)
MCHC RBC AUTO-ENTMCNC: 34.1 G/DL (ref 31.5–36.5)
MCV RBC AUTO: 106 FL (ref 78–100)
MONOCYTES # BLD AUTO: 0.4 10E9/L (ref 0–1.3)
MONOCYTES NFR BLD AUTO: 11.9 %
MUCOUS THREADS #/AREA URNS LPF: PRESENT /LPF
NEUTROPHILS # BLD AUTO: 2.1 10E9/L (ref 1.6–8.3)
NEUTROPHILS NFR BLD AUTO: 66.2 %
NITRATE UR QL: NEGATIVE
NRBC # BLD AUTO: 0 10*3/UL
NRBC BLD AUTO-RTO: 0 /100
PH UR STRIP: 6 PH (ref 5–7)
PLATELET # BLD AUTO: 207 10E9/L (ref 150–450)
POTASSIUM SERPL-SCNC: 3.1 MMOL/L (ref 3.4–5.3)
PROT SERPL-MCNC: 5.7 G/DL (ref 6.8–8.8)
RBC # BLD AUTO: 2.74 10E12/L (ref 3.8–5.2)
RBC #/AREA URNS AUTO: 2 /HPF (ref 0–2)
SARS-COV-2 RNA RESP QL NAA+PROBE: NEGATIVE
SODIUM SERPL-SCNC: 142 MMOL/L (ref 133–144)
SOURCE: ABNORMAL
SP GR UR STRIP: 1.02 (ref 1–1.03)
SPECIMEN SOURCE: NORMAL
SQUAMOUS #/AREA URNS AUTO: <1 /HPF (ref 0–1)
TROPONIN I SERPL-MCNC: 2.85 UG/L (ref 0–0.04)
UROBILINOGEN UR STRIP-MCNC: NORMAL MG/DL (ref 0–2)
WBC # BLD AUTO: 3.2 10E9/L (ref 4–11)
WBC #/AREA URNS AUTO: 6 /HPF (ref 0–5)

## 2021-01-13 PROCEDURE — 80053 COMPREHEN METABOLIC PANEL: CPT | Performed by: EMERGENCY MEDICINE

## 2021-01-13 PROCEDURE — 93005 ELECTROCARDIOGRAM TRACING: CPT | Mod: 76

## 2021-01-13 PROCEDURE — 81001 URINALYSIS AUTO W/SCOPE: CPT | Performed by: EMERGENCY MEDICINE

## 2021-01-13 PROCEDURE — 76705 ECHO EXAM OF ABDOMEN: CPT

## 2021-01-13 PROCEDURE — 84484 ASSAY OF TROPONIN QUANT: CPT | Performed by: EMERGENCY MEDICINE

## 2021-01-13 PROCEDURE — 96365 THER/PROPH/DIAG IV INF INIT: CPT

## 2021-01-13 PROCEDURE — 77412 RADIATION TX DELIVERY LVL 3: CPT

## 2021-01-13 PROCEDURE — 93005 ELECTROCARDIOGRAM TRACING: CPT

## 2021-01-13 PROCEDURE — 83690 ASSAY OF LIPASE: CPT | Performed by: EMERGENCY MEDICINE

## 2021-01-13 PROCEDURE — 99291 CRITICAL CARE FIRST HOUR: CPT | Mod: 25

## 2021-01-13 PROCEDURE — 99223 1ST HOSP IP/OBS HIGH 75: CPT | Mod: AI | Performed by: INTERNAL MEDICINE

## 2021-01-13 PROCEDURE — 96361 HYDRATE IV INFUSION ADD-ON: CPT

## 2021-01-13 PROCEDURE — 120N000001 HC R&B MED SURG/OB

## 2021-01-13 PROCEDURE — 77417 THER RADIOLOGY PORT IMAGE(S): CPT

## 2021-01-13 PROCEDURE — 258N000003 HC RX IP 258 OP 636: Performed by: EMERGENCY MEDICINE

## 2021-01-13 PROCEDURE — 36415 COLL VENOUS BLD VENIPUNCTURE: CPT | Performed by: EMERGENCY MEDICINE

## 2021-01-13 PROCEDURE — 250N000011 HC RX IP 250 OP 636: Performed by: EMERGENCY MEDICINE

## 2021-01-13 PROCEDURE — 85025 COMPLETE CBC W/AUTO DIFF WBC: CPT | Performed by: EMERGENCY MEDICINE

## 2021-01-13 PROCEDURE — 85730 THROMBOPLASTIN TIME PARTIAL: CPT | Performed by: EMERGENCY MEDICINE

## 2021-01-13 PROCEDURE — 87635 SARS-COV-2 COVID-19 AMP PRB: CPT | Performed by: EMERGENCY MEDICINE

## 2021-01-13 PROCEDURE — C9803 HOPD COVID-19 SPEC COLLECT: HCPCS

## 2021-01-13 PROCEDURE — 96376 TX/PRO/DX INJ SAME DRUG ADON: CPT

## 2021-01-13 PROCEDURE — 96375 TX/PRO/DX INJ NEW DRUG ADDON: CPT

## 2021-01-13 PROCEDURE — 250N000013 HC RX MED GY IP 250 OP 250 PS 637: Performed by: EMERGENCY MEDICINE

## 2021-01-13 RX ORDER — AMOXICILLIN 250 MG
1 CAPSULE ORAL 2 TIMES DAILY PRN
Status: DISCONTINUED | OUTPATIENT
Start: 2021-01-13 | End: 2021-01-22 | Stop reason: HOSPADM

## 2021-01-13 RX ORDER — ASPIRIN 81 MG/1
324 TABLET, CHEWABLE ORAL ONCE
Status: COMPLETED | OUTPATIENT
Start: 2021-01-13 | End: 2021-01-13

## 2021-01-13 RX ORDER — ASPIRIN 81 MG/1
81 TABLET ORAL DAILY
Status: DISCONTINUED | OUTPATIENT
Start: 2021-01-14 | End: 2021-01-22 | Stop reason: HOSPADM

## 2021-01-13 RX ORDER — HEPARIN SODIUM 10000 [USP'U]/100ML
0-5000 INJECTION, SOLUTION INTRAVENOUS CONTINUOUS
Status: DISCONTINUED | OUTPATIENT
Start: 2021-01-13 | End: 2021-01-14

## 2021-01-13 RX ORDER — ACETAMINOPHEN 325 MG/1
650 TABLET ORAL EVERY 4 HOURS PRN
Status: DISCONTINUED | OUTPATIENT
Start: 2021-01-13 | End: 2021-01-22 | Stop reason: HOSPADM

## 2021-01-13 RX ORDER — SODIUM CHLORIDE 9 MG/ML
INJECTION, SOLUTION INTRAVENOUS CONTINUOUS
Status: DISCONTINUED | OUTPATIENT
Start: 2021-01-14 | End: 2021-01-14

## 2021-01-13 RX ORDER — HYDROMORPHONE HYDROCHLORIDE 1 MG/ML
0.5 INJECTION, SOLUTION INTRAMUSCULAR; INTRAVENOUS; SUBCUTANEOUS ONCE
Status: COMPLETED | OUTPATIENT
Start: 2021-01-13 | End: 2021-01-13

## 2021-01-13 RX ORDER — ONDANSETRON 4 MG/1
4 TABLET, ORALLY DISINTEGRATING ORAL EVERY 6 HOURS PRN
Status: DISCONTINUED | OUTPATIENT
Start: 2021-01-13 | End: 2021-01-22 | Stop reason: HOSPADM

## 2021-01-13 RX ORDER — HEPARIN SODIUM 10000 [USP'U]/100ML
0-5000 INJECTION, SOLUTION INTRAVENOUS CONTINUOUS
Status: DISCONTINUED | OUTPATIENT
Start: 2021-01-14 | End: 2021-01-15

## 2021-01-13 RX ORDER — GABAPENTIN 300 MG/1
900 CAPSULE ORAL AT BEDTIME
Status: DISCONTINUED | OUTPATIENT
Start: 2021-01-14 | End: 2021-01-22 | Stop reason: HOSPADM

## 2021-01-13 RX ORDER — LIDOCAINE 40 MG/G
CREAM TOPICAL
Status: DISCONTINUED | OUTPATIENT
Start: 2021-01-13 | End: 2021-01-22 | Stop reason: HOSPADM

## 2021-01-13 RX ORDER — HYDROMORPHONE HYDROCHLORIDE 1 MG/ML
0.2 INJECTION, SOLUTION INTRAMUSCULAR; INTRAVENOUS; SUBCUTANEOUS
Status: DISCONTINUED | OUTPATIENT
Start: 2021-01-13 | End: 2021-01-22 | Stop reason: HOSPADM

## 2021-01-13 RX ORDER — ATORVASTATIN CALCIUM 40 MG/1
40 TABLET, FILM COATED ORAL EVERY EVENING
Status: DISCONTINUED | OUTPATIENT
Start: 2021-01-14 | End: 2021-01-22 | Stop reason: HOSPADM

## 2021-01-13 RX ORDER — HYDROMORPHONE HYDROCHLORIDE 1 MG/ML
0.5 INJECTION, SOLUTION INTRAMUSCULAR; INTRAVENOUS; SUBCUTANEOUS
Status: COMPLETED | OUTPATIENT
Start: 2021-01-13 | End: 2021-01-13

## 2021-01-13 RX ORDER — DONEPEZIL HYDROCHLORIDE 10 MG/1
10 TABLET, FILM COATED ORAL AT BEDTIME
Status: DISCONTINUED | OUTPATIENT
Start: 2021-01-14 | End: 2021-01-20

## 2021-01-13 RX ORDER — ONDANSETRON 2 MG/ML
4 INJECTION INTRAMUSCULAR; INTRAVENOUS EVERY 6 HOURS PRN
Status: DISCONTINUED | OUTPATIENT
Start: 2021-01-13 | End: 2021-01-22 | Stop reason: HOSPADM

## 2021-01-13 RX ORDER — AMOXICILLIN 250 MG
2 CAPSULE ORAL 2 TIMES DAILY PRN
Status: DISCONTINUED | OUTPATIENT
Start: 2021-01-13 | End: 2021-01-22 | Stop reason: HOSPADM

## 2021-01-13 RX ORDER — SODIUM CHLORIDE 9 MG/ML
INJECTION, SOLUTION INTRAVENOUS CONTINUOUS
Status: DISCONTINUED | OUTPATIENT
Start: 2021-01-13 | End: 2021-01-14

## 2021-01-13 RX ADMIN — HYDROMORPHONE HYDROCHLORIDE 0.5 MG: 1 INJECTION, SOLUTION INTRAMUSCULAR; INTRAVENOUS; SUBCUTANEOUS at 20:20

## 2021-01-13 RX ADMIN — SODIUM CHLORIDE 1000 ML: 9 INJECTION, SOLUTION INTRAVENOUS at 20:19

## 2021-01-13 RX ADMIN — HEPARIN SODIUM 750 UNITS/HR: 10000 INJECTION, SOLUTION INTRAVENOUS at 21:53

## 2021-01-13 RX ADMIN — PROCHLORPERAZINE EDISYLATE 5 MG: 5 INJECTION INTRAMUSCULAR; INTRAVENOUS at 20:21

## 2021-01-13 RX ADMIN — HYDROMORPHONE HYDROCHLORIDE 0.5 MG: 1 INJECTION, SOLUTION INTRAMUSCULAR; INTRAVENOUS; SUBCUTANEOUS at 21:29

## 2021-01-13 RX ADMIN — ASPIRIN 81 MG CHEWABLE TABLET 324 MG: 81 TABLET CHEWABLE at 21:28

## 2021-01-13 ASSESSMENT — ENCOUNTER SYMPTOMS
SORE THROAT: 0
ABDOMINAL PAIN: 1
SHORTNESS OF BREATH: 0
CHILLS: 0
FEVER: 0
COUGH: 0

## 2021-01-13 ASSESSMENT — MIFFLIN-ST. JEOR: SCORE: 1087.82

## 2021-01-13 NOTE — PROGRESS NOTES
Vital Signs: WNL. Patient afebrile.   Pain/Comfort: Patient rating headache as a 3/10 and abdominal pain as 2/10. PRN pain medication given per MAR. Heat applied to abdomen. Patient stated adequate relief with current pain regimen. Patient encouraged to call this RN if pain starts to increase. Patient stated an understanding.   Assessment: PIV site c/d/i and flushed.   Diet: Patient tolerating PO intake. Low Fat diet encouraged. Patient ate more for dinner than previous meal and patient also tolerated 1/2 of boost.   Output: Patient voiding.   Activity/Ambulation: patient up to bathroom indendently. Fall precautions maintained.   Social: Patient calm and cooperative. All questions answered.   Plan: Monitor/assess pain. Maintain PIV. Encourage PO intake. Possible discharge home pending amount eaten at dinner.     Patient discharged per MD orders. PIV removed prior to discharge. Offered to call  to go over discharge instructions with both him and her. Patient declined. Patient given instructions on diet, activity, medications, and follow-up appointments. Patient stated that she had no additional questions at time of discharge. Patient showing no s/s of distress upon discharge.

## 2021-01-14 ENCOUNTER — APPOINTMENT (OUTPATIENT)
Dept: CARDIOLOGY | Facility: CLINIC | Age: 76
DRG: 374 | End: 2021-01-14
Attending: INTERNAL MEDICINE
Payer: MEDICARE

## 2021-01-14 LAB
CHOLEST SERPL-MCNC: 145 MG/DL
ERYTHROCYTE [DISTWIDTH] IN BLOOD BY AUTOMATED COUNT: 14.4 % (ref 10–15)
HCT VFR BLD AUTO: 28.8 % (ref 35–47)
HDLC SERPL-MCNC: 49 MG/DL
HGB BLD-MCNC: 9.9 G/DL (ref 11.7–15.7)
INTERPRETATION ECG - MUSE: NORMAL
INTERPRETATION ECG - MUSE: NORMAL
LDLC SERPL CALC-MCNC: 76 MG/DL
LIPASE SERPL-CCNC: 460 U/L (ref 73–393)
MCH RBC QN AUTO: 36.7 PG (ref 26.5–33)
MCHC RBC AUTO-ENTMCNC: 34.4 G/DL (ref 31.5–36.5)
MCV RBC AUTO: 107 FL (ref 78–100)
NONHDLC SERPL-MCNC: 96 MG/DL
PLATELET # BLD AUTO: 199 10E9/L (ref 150–450)
POTASSIUM SERPL-SCNC: 3.2 MMOL/L (ref 3.4–5.3)
POTASSIUM SERPL-SCNC: 3.5 MMOL/L (ref 3.4–5.3)
RBC # BLD AUTO: 2.7 10E12/L (ref 3.8–5.2)
TRIGL SERPL-MCNC: 101 MG/DL
TROPONIN I SERPL-MCNC: 1.2 UG/L (ref 0–0.04)
TROPONIN I SERPL-MCNC: 1.73 UG/L (ref 0–0.04)
UFH PPP CHRO-ACNC: 0.22 IU/ML
UFH PPP CHRO-ACNC: 0.5 IU/ML
UFH PPP CHRO-ACNC: 0.67 IU/ML
WBC # BLD AUTO: 3.6 10E9/L (ref 4–11)

## 2021-01-14 PROCEDURE — 250N000011 HC RX IP 250 OP 636: Performed by: INTERNAL MEDICINE

## 2021-01-14 PROCEDURE — 85520 HEPARIN ASSAY: CPT | Performed by: EMERGENCY MEDICINE

## 2021-01-14 PROCEDURE — 99222 1ST HOSP IP/OBS MODERATE 55: CPT | Performed by: SURGERY

## 2021-01-14 PROCEDURE — 120N000001 HC R&B MED SURG/OB

## 2021-01-14 PROCEDURE — 258N000003 HC RX IP 258 OP 636: Performed by: INTERNAL MEDICINE

## 2021-01-14 PROCEDURE — 250N000013 HC RX MED GY IP 250 OP 250 PS 637: Performed by: INTERNAL MEDICINE

## 2021-01-14 PROCEDURE — 36415 COLL VENOUS BLD VENIPUNCTURE: CPT | Performed by: INTERNAL MEDICINE

## 2021-01-14 PROCEDURE — 255N000002 HC RX 255 OP 636: Performed by: INTERNAL MEDICINE

## 2021-01-14 PROCEDURE — 85027 COMPLETE CBC AUTOMATED: CPT | Performed by: INTERNAL MEDICINE

## 2021-01-14 PROCEDURE — 85520 HEPARIN ASSAY: CPT | Performed by: INTERNAL MEDICINE

## 2021-01-14 PROCEDURE — 83690 ASSAY OF LIPASE: CPT | Performed by: INTERNAL MEDICINE

## 2021-01-14 PROCEDURE — 84132 ASSAY OF SERUM POTASSIUM: CPT | Performed by: INTERNAL MEDICINE

## 2021-01-14 PROCEDURE — 84484 ASSAY OF TROPONIN QUANT: CPT | Performed by: INTERNAL MEDICINE

## 2021-01-14 PROCEDURE — 93306 TTE W/DOPPLER COMPLETE: CPT | Mod: 26 | Performed by: INTERNAL MEDICINE

## 2021-01-14 PROCEDURE — 99233 SBSQ HOSP IP/OBS HIGH 50: CPT | Performed by: INTERNAL MEDICINE

## 2021-01-14 PROCEDURE — 99221 1ST HOSP IP/OBS SF/LOW 40: CPT | Mod: 25 | Performed by: INTERNAL MEDICINE

## 2021-01-14 PROCEDURE — 999N000208 ECHOCARDIOGRAM COMPLETE

## 2021-01-14 PROCEDURE — 80061 LIPID PANEL: CPT | Performed by: INTERNAL MEDICINE

## 2021-01-14 RX ORDER — NALOXONE HYDROCHLORIDE 0.4 MG/ML
0.2 INJECTION, SOLUTION INTRAMUSCULAR; INTRAVENOUS; SUBCUTANEOUS
Status: DISCONTINUED | OUTPATIENT
Start: 2021-01-14 | End: 2021-01-22 | Stop reason: HOSPADM

## 2021-01-14 RX ORDER — METOPROLOL TARTRATE 25 MG/1
25 TABLET, FILM COATED ORAL 2 TIMES DAILY
Status: DISCONTINUED | OUTPATIENT
Start: 2021-01-14 | End: 2021-01-22 | Stop reason: HOSPADM

## 2021-01-14 RX ORDER — SODIUM CHLORIDE 9 MG/ML
INJECTION, SOLUTION INTRAVENOUS CONTINUOUS
Status: DISCONTINUED | OUTPATIENT
Start: 2021-01-14 | End: 2021-01-15

## 2021-01-14 RX ORDER — NALOXONE HYDROCHLORIDE 0.4 MG/ML
0.4 INJECTION, SOLUTION INTRAMUSCULAR; INTRAVENOUS; SUBCUTANEOUS
Status: DISCONTINUED | OUTPATIENT
Start: 2021-01-14 | End: 2021-01-22 | Stop reason: HOSPADM

## 2021-01-14 RX ORDER — POTASSIUM CHLORIDE 1500 MG/1
20 TABLET, EXTENDED RELEASE ORAL ONCE
Status: COMPLETED | OUTPATIENT
Start: 2021-01-14 | End: 2021-01-14

## 2021-01-14 RX ADMIN — DONEPEZIL HYDROCHLORIDE 10 MG: 10 TABLET ORAL at 21:28

## 2021-01-14 RX ADMIN — ATORVASTATIN CALCIUM 40 MG: 40 TABLET, FILM COATED ORAL at 21:28

## 2021-01-14 RX ADMIN — ATORVASTATIN CALCIUM 40 MG: 40 TABLET, FILM COATED ORAL at 00:26

## 2021-01-14 RX ADMIN — SODIUM CHLORIDE: 9 INJECTION, SOLUTION INTRAVENOUS at 00:17

## 2021-01-14 RX ADMIN — METOPROLOL TARTRATE 25 MG: 25 TABLET, FILM COATED ORAL at 08:42

## 2021-01-14 RX ADMIN — GABAPENTIN 900 MG: 300 CAPSULE ORAL at 00:25

## 2021-01-14 RX ADMIN — GABAPENTIN 900 MG: 300 CAPSULE ORAL at 21:28

## 2021-01-14 RX ADMIN — ACETAMINOPHEN 650 MG: 325 TABLET, FILM COATED ORAL at 08:42

## 2021-01-14 RX ADMIN — ONDANSETRON 4 MG: 2 INJECTION INTRAMUSCULAR; INTRAVENOUS at 03:14

## 2021-01-14 RX ADMIN — SODIUM CHLORIDE: 9 INJECTION, SOLUTION INTRAVENOUS at 10:28

## 2021-01-14 RX ADMIN — SODIUM CHLORIDE: 9 INJECTION, SOLUTION INTRAVENOUS at 08:44

## 2021-01-14 RX ADMIN — METOPROLOL TARTRATE 25 MG: 25 TABLET, FILM COATED ORAL at 21:28

## 2021-01-14 RX ADMIN — ASPIRIN 81 MG: 81 TABLET, COATED ORAL at 08:42

## 2021-01-14 RX ADMIN — HUMAN ALBUMIN MICROSPHERES AND PERFLUTREN 3 ML: 10; .22 INJECTION, SOLUTION INTRAVENOUS at 14:00

## 2021-01-14 RX ADMIN — POTASSIUM CHLORIDE 20 MEQ: 1500 TABLET, EXTENDED RELEASE ORAL at 03:03

## 2021-01-14 RX ADMIN — DONEPEZIL HYDROCHLORIDE 10 MG: 10 TABLET ORAL at 00:26

## 2021-01-14 ASSESSMENT — ACTIVITIES OF DAILY LIVING (ADL)
ADLS_ACUITY_SCORE: 18
ADLS_ACUITY_SCORE: 18
ADLS_ACUITY_SCORE: 19
ADLS_ACUITY_SCORE: 18
ADLS_ACUITY_SCORE: 17
ADLS_ACUITY_SCORE: 19

## 2021-01-14 ASSESSMENT — MIFFLIN-ST. JEOR: SCORE: 1133.64

## 2021-01-14 NOTE — PHARMACY-ADMISSION MEDICATION HISTORY
Admission medication history interview status for this patient is complete. See [facility] admission navigator for allergy information, prior to admission medications and immunization status.     Medication history interview done via telephone during Covid-19 pandemic, indicate source(s): Family  Medication history resources (including written lists, pill bottles, clinic record):[facility] med list  Pharmacy: -    Changes made to PTA medication list:  Added: none  Deleted: none  Changed: none    Actions taken by pharmacist (provider contacted, etc): talked to  Margareth [phone]    Additional medication history information:currently not taking Kisqali until appt with oncologist in 2 weeks    Medication reconciliation/reorder completed by provider prior to medication history?  No   (Y/N)     ?      Prior to Admission medications    Medication Sig Last Dose Taking? Auth Provider   acetaminophen (TYLENOL) 325 MG tablet Take 325-650 mg by mouth every 6 hours as needed for mild pain 1/13/2021 at Unknown time Yes Unknown, Entered By History   donepezil (ARICEPT) 10 MG tablet Take 10 mg by mouth At Bedtime 1/12/2021 at bedtime Yes Unknown, Entered By History   gabapentin (NEURONTIN) 300 MG capsule Take 900 mg by mouth At Bedtime For restless leg syndrome  1/12/2021 at bedtime Yes Unknown, Entered By History   Multiple Vitamins-Minerals (MULTIVITAMIN ADULT) TABS Take 1 tablet by mouth daily 1/12/2021 at Unknown time Yes Unknown, Entered By History   ondansetron (ZOFRAN) 4 MG tablet Take 4 mg by mouth every 8 hours as needed for nausea   Yes Unknown, Entered By History   ribociclib (KISQALI) 200 MG tablet Take 2 tablets (400 mg) by mouth daily for 21 days, followed by 7 days off in a 28 day cycle  Yes Unknown, Entered By History

## 2021-01-14 NOTE — PROGRESS NOTES
[facility]  Hospitalist Progress Note  Reymundo Rodriguez MD, MD 01/14/2021  (Text Page)  Reason for Stay (Diagnosis): Elevated troponin type II AMI versus ACS  Gallstone pancreatitis         Assessment and Plan:      Summary of Stay: Lisa Galloway is a 75 year old female history of metastatic breast cancer with nauseous metastatic lesions noted on recent PET scan, GERD, mild intermittent asthma, anxiety, recent hospitalization for abdominal pain found with acute pancreatitis felt secondary to gallstone etiology with complications of mild DAWSON and was discharged + 1/12/2021 on good condition and stable hemodynamics.  She has a referral set up with  GI and general surgery given her primary issues of her last hospitalization with suspected gallstone induced pancreatitis.  At that time she demonstrated tolerance to oral intake and symptoms appears to be improving and subsequently discharged home.  He also presents back due to her recurrence of abdominal symptomatology    Problem List:     1. Abdominal pain  2. Gallstone pancreatitis  3. Elevated troponin I affected with underlying ACS  4. ? Cholecystitis  5. GERD  6.  History metastatic breast cancer  7.  History of bronchial asthma not in exacerbation  8.  Hypokalemia     Continue inpatient care.  Remain on telemetry monitoring.  Subsequent troponin I levels decreasing.  Overall symptomatology improving as well.  Highly appreciate prompt input from cardiology service.  Awaiting for echocardiogram results for further planning of recommendations for risk stratification given this elevated troponin I levels.  Remain on beta-blockers, aspirin and statins.  Remain afebrile, stable anemia, still with leukopenia.  Lipase trending down  Hypokalemia improving  May start clear liquids if no plans of any cardiac procedures today    DVT Prophylaxis: IV heparin  Code Status: DNR / DNI  Discharge Dispo: home  Estimated Disch Date / # of Days until Disch: 2  "days        Interval History (Subjective):      Continuing care today.  Seen and examined.  Chart reviewed.  Mariana is feeling better with no further complaints of nausea and no reported vomiting.  Still with some epigastric discomfort but significant improvement.  Afebrile.  No bleeding tendencies.  Mental state at baseline as she remained pleasant, cooperative and interactive.  No hypoxia.                    Physical Exam:      Last Vital Signs:  BP (!) 161/69 (BP Location: Left leg)   Pulse 93   Temp 98.4  F (36.9  C) (Oral)   Resp 18   Ht 1.575 m (5' 2\")   Wt 68.5 kg (151 lb 1.6 oz)   SpO2 97%   BMI 27.64 kg/m      I/O last 3 completed shifts:  In: -   Out: 100 [Emesis/NG output:100]  Wt Readings from Last 1 Encounters:   01/14/21 68.5 kg (151 lb 1.6 oz)     Vitals:    01/13/21 2124 01/14/21 0641   Weight: 64 kg (141 lb) 68.5 kg (151 lb 1.6 oz)       Constitutional: Awake, alert, cooperative, no apparent distress   Respiratory: Clear to auscultation bilaterally, no crackles or wheezing   Cardiovascular: Regular rate and rhythm, normal S1 and S2, and no murmur noted   Abdomen: Normal bowel sounds, soft, non-distended, non-tender   Skin: No rashes, no cyanosis, dry to touch   Neuro: Alert and oriented x3, no weakness, spontaneous and coherent speech   Extremities: No edema, normal range of motion   Other(s): Euthymic mood, not agitated       All other systems: Negative          Medications:      All current medications were reviewed with changes reflected in problem list.         Data:      All new lab and imaging data was reviewed.   Labs:  No results for input(s): CULT in the last 168 hours.  Recent Labs   Lab 01/14/21  0002 01/13/21  2037 01/10/21  0613   WBC 3.6* 3.2* 3.3*   HGB 9.9* 9.9* 8.4*   HCT 28.8* 29.0* 25.3*   * 106* 110*    207 158     Recent Labs   Lab 01/14/21  0650 01/14/21  0002 01/13/21 2037 01/12/21  0801 01/11/21  1543 01/11/21  0619 01/10/21  0613 01/09/21  0648 " 01/08/21 2218 01/08/21 2218   NA  --   --  142  --   --   --  143 143  --  138   POTASSIUM 3.5 3.2* 3.1*  --   --  3.6 3.5 3.6   < > 3.7   CHLORIDE  --   --  111*  --   --   --  114* 115*  --  104   CO2  --   --  26  --   --   --  22 23  --  25   ANIONGAP  --   --  5  --   --   --  7 5  --  9   GLC  --   --  94  --   --   --  55* 123*  --  95   BUN  --   --  6*  --   --   --  10 13  --  15   CR  --   --  0.99  --   --   --  1.05* 1.07*  --  1.21*   GFRESTIMATED  --   --  56*  --   --   --  52* 50*  --  44*   GFRESTBLACK  --   --  64  --   --   --  60* 59*  --  50*   EARL  --   --  8.3*  --   --   --  7.1* 7.6*  --  9.5   MAG  --   --   --  2.0 1.5* 1.4* 1.6  --    < >  --    PROTTOTAL  --   --  5.7*  --   --   --   --   --   --  7.8   ALBUMIN  --   --  2.5*  --   --   --   --   --   --  3.7   BILITOTAL  --   --  0.5  --   --   --   --   --   --  0.7   ALKPHOS  --   --  57  --   --   --   --   --   --  84   AST  --   --  31  --   --   --   --   --   --  20   ALT  --   --  12  --   --   --   --   --   --  9    < > = values in this interval not displayed.     No results for input(s): DD in the last 168 hours.  No results for input(s): SED, CRP in the last 168 hours.  Recent Labs   Lab 01/13/21  2037 01/10/21  0613 01/09/21  0648 01/08/21 2218   GLC 94 55* 123* 95     No results for input(s): INR in the last 168 hours.  Recent Labs   Lab 01/14/21  0650 01/13/21 2037 01/11/21  0619   LIPASE 460* 747* 646*     Recent Labs   Lab 01/14/21  0650 01/13/21 2037   TROPI 1.733* 2.852*     No results for input(s): TSH in the last 168 hours.  Recent Labs   Lab 01/13/21 2024   COLOR Light Yellow   APPEARANCE Clear   URINEGLC Negative   URINEBILI Negative   URINEKETONE 10*   SG 1.016   UBLD Negative   URINEPH 6.0   PROTEIN 20*   NITRITE Negative   LEUKEST Moderate*   RBCU 2   WBCU 6*      Imaging:   Results for orders placed or performed during the hospital encounter of 01/13/21   US Abdomen Limited    Narrative    EXAM: US  ABDOMEN LIMITED  LOCATION: [facility]  DATE/TIME: 1/13/2021 10:00 PM    INDICATION: Right-sided abdominal pain. History of pancreatitis.  COMPARISON: CT abdomen and pelvis 01/08/2021  TECHNIQUE: Limited abdominal ultrasound.    FINDINGS:    GALLBLADDER: Small amount of sludge and tiny stones in the gallbladder. Wall thickness upper range of normal measuring 3 mm. Ascites in the right upper quadrant adjacent to the gallbladder.    BILE DUCTS: No biliary dilatation. The common duct measures 5 mm.    LIVER: Normal parenchyma with smooth contour. No focal mass.    RIGHT KIDNEY: Moderate right hydronephrosis    PANCREAS: Dilated pancreatic duct measuring 5 mm.    No ascites.      Impression    IMPRESSION:  1.  Small amount of sludge and stone material in the gallbladder. Wall thickness upper range of normal. Findings equivocal for acute cholecystitis.  2.  Ascites right upper and right lower quadrants.  3.  Slight dilatation of the pancreatic duct as seen on CT.  4.  Right hydronephrosis. This also was present on CT with ureteral stent in place.

## 2021-01-14 NOTE — H&P
[facility]  Hospitalist Admission Note  Name: Lisa Galloway    MRN: [id number]  YOB: 1945    Age: 75 year old  Date of admission: 1/13/2021  Primary care provider: Nichole Lira            Assessment and Plan:   Lisa Galloway is a 75 year old female history of metastatic breast cancer with nauseous metastatic lesions noted on recent PET scan, GERD, mild intermittent asthma, anxiety, recent hospitalization for abdominal pain found with acute pancreatitis felt secondary to gallstone etiology with complications of mild DAWSON and was discharged + 1/12/2021 on good condition and stable hemodynamics.  She has a referral set up with  GI and general surgery given her primary issues of her last hospitalization with suspected gallstone induced pancreatitis.  At that time she demonstrated tolerance to oral intake and symptoms appears to be improving and subsequently discharged home.  He also presents back due to her recurrence of abdominal symptomatology    1. Abdominal pain  2. Gallstone pancreatitis  3. Elevated troponin I affected with underlying ACS  4. ? Cholecystitis  5. GERD  6.  History metastatic breast cancer  7.  History of bronchial asthma not in exacerbation  8.  Hypokalemia    Admit as inpatient.  At risk for clinical deterioration.  Continue with cardiac telemetry monitoring.  Troponin I monitoring.  Echocardiogram requested.  Formal cardiology evaluation.  Aspirin started.  Statins initiated.  Check lipid panel  Holding beta-blockers given soft BP levels.  Continued on IV heparin  NPO for tomorrow we will seen by cardiology  -Requested formal general surgery evaluation given earlier also pancreatitis,?  Cholecystitis  -IV Pepcid  -Optimize pain control  -As needed antiemetics and antipyretics  -Correction of electrolytes with supplementation protocol  -COVID-19 testing negative    Code status: DNR/DNI as expressed by the patient consistent with most recent directives  Admit  to inpatient  Prophylaxis: On IV heparin  Disposition: Hopeful for home discharge but likely will be requiring at least 2 inpatient hospitalization days          Chief Complaint:   Abdominal pain, vomiting        Source of Information:   Patient with good reliability  Discussion with ED physician  Review of E chart records         History of Present Illness:   Lisa Galloway is a 75 year old female history of metastatic breast cancer with nauseous metastatic lesions noted on recent PET scan, GERD, mild intermittent asthma, anxiety, recent hospitalization for abdominal pain found with acute pancreatitis felt secondary to gallstone etiology with complications of mild DAWSON and was discharged + 1/12/2021 on good condition and stable hemodynamics.  She has a referral set up.  GI and general surgery given her primary issues of her last hospitalization with suspected gallstone induced pancreatitis.  At that time she demonstrated tolerance to oral intake and symptoms appears to be improving and subsequently discharged home.  However she did mention that soon after going home she started to complaints of nausea, vomiting and increasing right-sided abdominal pain that prompted her to seek medical attention and was seen in the emergency room earlier today.  Further work-up showed that she has stable hemodynamics, afebrile, not hypoxic but lipase is elevated almost same level is within during her initial presentation but also revealed elevated troponin I levels.    EKG was reassuring with no obvious ischemic findings.  Her imaging showed possibility of acute focal acute cholecystitis.  IV fluid support, pain control and anticoagulation started.  Her case was subsequently referred to us for further evaluation hence this hospitalization             Past Medical History:     As listed above       Past Surgical History:     Nondistended       Social History:     Social History     Tobacco Use     Smoking status: Never Smoker      "Smokeless tobacco: Never Used   Substance Use Topics     Alcohol use: Not Currently             Family History:   Family history was fully reviewed and non-contributory in this case.         Allergies:     Allergies   Allergen Reactions     Amoxicillin-Pot Clavulanate Itching     Erythromycin Nausea     Latex      PN: Latex Sensitivity Flag     Levofloxacin      PN: joint pain     Sulfa Drugs GI Disturbance     Oxycodone Rash             Medications:     Prior to Admission medications    Medication Sig Last Dose Taking? Auth Provider   acetaminophen (TYLENOL) 325 MG tablet Take 325-650 mg by mouth every 6 hours as needed for mild pain 1/13/2021 at Unknown time Yes Unknown, Entered By History   donepezil (ARICEPT) 10 MG tablet Take 10 mg by mouth At Bedtime 1/12/2021 at bedtime Yes Unknown, Entered By History   gabapentin (NEURONTIN) 300 MG capsule Take 900 mg by mouth At Bedtime For restless leg syndrome  1/12/2021 at bedtime Yes Unknown, Entered By History   Multiple Vitamins-Minerals (MULTIVITAMIN ADULT) TABS Take 1 tablet by mouth daily 1/12/2021 at Unknown time Yes Unknown, Entered By History   ondansetron (ZOFRAN) 4 MG tablet Take 4 mg by mouth every 8 hours as needed for nausea   Yes Unknown, Entered By History   ribociclib (KISQALI) 200 MG tablet Take 2 tablets (400 mg) by mouth daily for 21 days, followed by 7 days off in a 28 day cycle  Yes Unknown, Entered By History             Review of Systems:   A Comprehensive greater than 10 system review of systems was carried out.  Pertinent positives and negatives are noted above.  Otherwise negative for contributory information.           Physical Exam:   Blood pressure 110/72, pulse 71, temperature 97.7  F (36.5  C), temperature source Oral, resp. rate 20, height 1.575 m (5' 2\"), weight 64 kg (141 lb), SpO2 99 %, not currently breastfeeding.  Wt Readings from Last 1 Encounters:   01/13/21 64 kg (141 lb)     Exam:  GENERAL: No apparent distress. Awake, alert, and " fully oriented.  HEENT: Normocephalic, atraumatic. Extraocular movements intact.  CARDIOVASCULAR: Regular rate and rhythm without murmurs or rubs. No JVD  PULMONARY: Clear to auscultation, no wheezes, crackles  ABDOMINAL: Soft, minimal tender epigastric area, non-distended. Bowel sounds normoactive. No hepatosplenomegaly.  EXTREMITIES: No cyanosis or clubbing. No edema.  NEUROLOGICAL: CN 2-12 grossly intact, awake and alert x3, spontaneous and coherent speech. no focal neurological deficits.  DERMATOLOGICAL: No rash, ulcer, ecchymoses, jaundice.  Psych: not agitation, not combative, pleasant mood           Data:   EKG: EKG reviewed showed normal sinus rhythm at 62 bpm, low voltage, septal infarct    Imaging:  Results for orders placed or performed during the hospital encounter of 01/13/21   US Abdomen Limited    Narrative    EXAM: US ABDOMEN LIMITED  LOCATION: [facility]  DATE/TIME: 1/13/2021 10:00 PM    INDICATION: Right-sided abdominal pain. History of pancreatitis.  COMPARISON: CT abdomen and pelvis 01/08/2021  TECHNIQUE: Limited abdominal ultrasound.    FINDINGS:    GALLBLADDER: Small amount of sludge and tiny stones in the gallbladder. Wall thickness upper range of normal measuring 3 mm. Ascites in the right upper quadrant adjacent to the gallbladder.    BILE DUCTS: No biliary dilatation. The common duct measures 5 mm.    LIVER: Normal parenchyma with smooth contour. No focal mass.    RIGHT KIDNEY: Moderate right hydronephrosis    PANCREAS: Dilated pancreatic duct measuring 5 mm.    No ascites.      Impression    IMPRESSION:  1.  Small amount of sludge and stone material in the gallbladder. Wall thickness upper range of normal. Findings equivocal for acute cholecystitis.  2.  Ascites right upper and right lower quadrants.  3.  Slight dilatation of the pancreatic duct as seen on CT.  4.  Right hydronephrosis. This also was present on CT with ureteral stent in place.       Labs:  No results for  input(s): CULT in the last 168 hours.  No results for input(s): PH, PHARTERIAL, PO2, TP5DANCKIMM, SAT, PCO2, HCO3, BASEEXCESS, TIMOTHY, BEB in the last 168 hours.    Invalid input(s): [id number]  Recent Labs   Lab 01/13/21  2037 01/10/21  0613 01/09/21  0648   WBC 3.2* 3.3* 3.0*   HGB 9.9* 8.4* 8.4*   HCT 29.0* 25.3* 25.2*   * 110* 109*    158 192     Recent Labs   Lab 01/13/21 2037 01/12/21  0801 01/11/21  1543 01/11/21  0619 01/10/21  0613 01/09/21  0648 01/08/21 2218 01/08/21 2218     --   --   --  143 143  --  138   POTASSIUM 3.1*  --   --  3.6 3.5 3.6   < > 3.7   CHLORIDE 111*  --   --   --  114* 115*  --  104   CO2 26  --   --   --  22 23  --  25   ANIONGAP 5  --   --   --  7 5  --  9   GLC 94  --   --   --  55* 123*  --  95   BUN 6*  --   --   --  10 13  --  15   CR 0.99  --   --   --  1.05* 1.07*  --  1.21*   GFRESTIMATED 56*  --   --   --  52* 50*  --  44*   GFRESTBLACK 64  --   --   --  60* 59*  --  50*   EARL 8.3*  --   --   --  7.1* 7.6*  --  9.5   MAG  --  2.0 1.5* 1.4* 1.6  --    < >  --    PROTTOTAL 5.7*  --   --   --   --   --   --  7.8   ALBUMIN 2.5*  --   --   --   --   --   --  3.7   BILITOTAL 0.5  --   --   --   --   --   --  0.7   ALKPHOS 57  --   --   --   --   --   --  84   AST 31  --   --   --   --   --   --  20   ALT 12  --   --   --   --   --   --  9    < > = values in this interval not displayed.     No results for input(s): SED, CRP in the last 168 hours.  Recent Labs   Lab 01/13/21  2037 01/10/21  0613 01/09/21  0648 01/08/21  2218   GLC 94 55* 123* 95     No results for input(s): INR in the last 168 hours.  Recent Labs   Lab 01/13/21 2037 01/11/21  0619 01/09/21  0648   LIPASE 747* 646* 1,538*     Recent Labs   Lab 01/09/21  0648   TRIG 120     Recent Labs   Lab 01/13/21 2037   TROPI 2.852*     Recent Labs   Lab 01/13/21 2024   COLOR Light Yellow   APPEARANCE Clear   URINEGLC Negative   URINEBILI Negative   URINEKETONE 10*   SG 1.016   UBLD Negative   URINEPH 6.0    PROTEIN 20*   NITRITE Negative   LEUKEST Moderate*   RBCU 2   WBCU 6*

## 2021-01-14 NOTE — PLAN OF CARE
VSS. Tele SR. RA.  Limb alert R for lymphedema, L for Midline. BP taken on leg.   K 3.5, WNL for K protocol, AM draw in    A/O X4.   Pt complained of pain 3/10, headache, tylenol given, no change in pain during AM. Pain gone in PM   Pt denies CP, SOB. LS diminished.   Abd rounded, soft, tender. BS audible, normoactive. BM 1/13  Pt denies N/V  Skin pale, warm    Heparin @ 600 units/hr   NS 75/hr  Hep 10A: 0.50 (WNL- redraw @ 0030 via PCU collect)   Echo completed on shift  Advanced to full liquid diet.   SBA  Cards/surg following.   Continue POC.

## 2021-01-14 NOTE — CONSULTS
Chief complaint:  Pancreatitis, abdominal pain    HPI:  This patient is a 75 year old female who was recently admitted with the diagnosis of pancreatitis.  She was found to have some small gallstones and it was felt this likely represented gallstone pancreatitis, though the patient did not have any elevation of her liver function tests.  She was discharged home with instructions to follow-up with surgery and to follow-up with the GI service for an endoscopic ultrasound.  The patient has known metastatic breast cancer to the bone.  She states that she returned to the emergency room on the day of discharge with ongoing pain.  She continues to state that she is having pain, though she does not feel that it is severe.  The patient is currently undergoing a cardiac work-up due to elevated troponins.  I have been asked to see the patient to discuss possible cholecystectomy.    Past Medical History:    Anxiety ([facility])     Asthma 1/24/2013     Ca Breast Female 5/19/2008     Depression ([facility])     Esophageal reflux 10/7/2005   mild stricture 2005 ; Gastroesophageal Reflux Disease     Hyperlipidemia #*LW 6 10/3/2004     Hypertension 3/29/2011       Past Surgical History:    MASTECTOMY Bilateral 2008   right br ca     TONSILLECTOMY   Urinary stent placement     Social History:  Social History     Socioeconomic History     Marital status:      Spouse name: Not on file     Number of children: Not on file     Years of education: Not on file     Highest education level: Not on file   Occupational History     Not on file   Social Needs     Financial resource strain: Not on file     Food insecurity     Worry: Not on file     Inability: Not on file     Transportation needs     Medical: Not on file     Non-medical: Not on file   Tobacco Use     Smoking status: Never Smoker     Smokeless tobacco: Never Used   Substance and Sexual Activity     Alcohol use: Not Currently     Drug use: Never     Sexual activity: Not on file   Lifestyle      Physical activity     Days per week: Not on file     Minutes per session: Not on file     Stress: Not on file   Relationships     Social connections     Talks on phone: Not on file     Gets together: Not on file     Attends Alevism service: Not on file     Active member of club or organization: Not on file     Attends meetings of clubs or organizations: Not on file     Relationship status: Not on file     Intimate partner violence     Fear of current or ex partner: Not on file     Emotionally abused: Not on file     Physically abused: Not on file     Forced sexual activity: Not on file   Other Topics Concern     Not on file   Social History Narrative     Not on file        Family History:  Family history is significant for breast cancer, lung disease, heart failure and high blood pressure    Review of Systems:  The 10 point Review of Systems is negative other than noted in the HPI and above.    Physical Exam:  General - This is a well developed, well nourished female in no apparent distress.  HEENT - Normocephalic, atraumatic.  No scleral icterus.  Neck - supple without masses  Lungs - clear to ascultation.    Heart - Regular rate & rhythm without murmur  Abdomen:   soft, non-distended with mild tenderness noted in the epigastric region and in the right upper quadrant . no masses palpated. normal bowel sounds.  Extremities - warm without edema  Neurologic - nonfocal    Relevant labs:  Normal liver function tests.  White blood cell count 3600.  Hemoglobin 9.9.  Lipase was elevated at 747.  On 1/9/2021 it was 1538.    Imaging:  Ultrasound reveals a small amount of sludge and tiny stones in the gallbladder.  Wall thickness is at the upper range of normal measuring 3 mm.  There is some ascites in the right upper quadrant adjacent to the gallbladder.  Findings are felt to be equivocal for acute cholecystitis.    Recent outside CT scan showed inflammation around the head of the pancreas and near the duodenum.  This  was felt to represent either pancreatitis or duodenal ulcer.    Assessment and Plan:  This is a patient with tiny gallstones and recent pancreatitis, though the findings of pancreatitis were relatively mild.  Her symptoms could conceivably be due to ongoing pancreatitis.  She certainly does not have a tremendous amount of pain and I would doubt significant cholecystitis, though it is certainly possible.  If she does have pancreatitis, it certainly could be related to her gallstones.  It would be worth consideration of laparoscopic cholecystectomy if the patient is a good surgical candidate.  She is currently undergoing work-up cardiology service and I think that work-up should be completed before we consider surgical intervention.  At this point, there is certainly no rush to get the patient to the operating room.  My suspicion is she will improve somewhat with time.  I will follow the patient with you while hospitalized.      Gilles Ace MD  [facility]

## 2021-01-14 NOTE — ED NOTES
"[facility]  ED Nurse Handoff Report    Lisa Galloway is a 75 year old female   ED Chief complaint: Vomiting and Abdominal Pain  . ED Diagnosis:   Final diagnoses:   ACS (acute coronary syndrome) (H)   RLQ abdominal pain   Acute pancreatitis, unspecified complication status, unspecified pancreatitis type     Allergies:   Allergies   Allergen Reactions     Amoxicillin-Pot Clavulanate Itching     Erythromycin Nausea     Latex      PN: Latex Sensitivity Flag     Levofloxacin      PN: joint pain     Sulfa Drugs GI Disturbance     Oxycodone Rash       Code Status: DNR / DNI  Activity level - Baseline/Home:  Independent. Activity Level - Current:   Assist X 1. Lift room needed: No. Bariatric: No   Needed: No   Isolation: No. Infection: Not Applicable.     Vital Signs:   Vitals:    01/13/21 2045 01/13/21 2100 01/13/21 2115 01/13/21 2124   BP:   105/78    Pulse: 64 65 69    Resp:       Temp:       TempSrc:       SpO2: 95% 96% 96%    Weight:    64 kg (141 lb)   Height:    1.575 m (5' 2\")       Cardiac Rhythm:  ,      Pain level:    Patient confused: No. Patient Falls Risk: Yes.   Elimination Status: Has voided   Patient Report - Initial Complaint:  Lisa Galloway is a 75 year old female with a history of cancer, GERD, hypertension, and acute epancreatitis who presents for evaluation of on going diffuse right-sided abdominal pain since her admission to [facility] on 01/08/2021 for acute pancreatitis and discharge on 01/12/2021. Lisa notes associated nausea and emesis. Lisa explains her last meal was at 8.5 hours ago and notes her pain was been relatively constant since her admission and discharge yesterday. She denies any migration of her pain. She denies any alcohol use. She denies any trauma. She denies any dysuria or diarrhea. She denies any infectious symptoms..   Focused Assessment: See Physician Note. Tender abdomen, nausea without emesis here   Tests Performed: US, LABS, UA, EKG, " Covid. Abnormal Results: See Results . ELEVATED TROPONIN  Treatments provided: fluids, Medication, Heparin, Aspirin, Pain Med, Admission  Family Comments: Not present due to Covid, called by patient  OBS brochure/video discussed/provided to patient:  N/A  ED Medications:   Medications   0.9% sodium chloride BOLUS (0 mLs Intravenous Stopped 1/13/21 2128)     Followed by   sodium chloride 0.9% infusion (has no administration in time range)   heparin 25,000 units in 0.45% NaCl 250 mL ANTICOAGULANT  infusion (750 Units/hr Intravenous New Bag 1/13/21 2153)   prochlorperazine (COMPAZINE) injection 5 mg (5 mg Intravenous Given 1/13/21 2021)   HYDROmorphone (PF) (DILAUDID) injection 0.5 mg (0.5 mg Intravenous Given 1/13/21 2020)   aspirin (ASA) chewable tablet 324 mg (324 mg Oral Given 1/13/21 2128)   heparin loading dose for LOW INTENSITY TREATMENT * Give BEFORE starting heparin infusion (3,850 Units Intravenous Given 1/13/21 2152)   HYDROmorphone (PF) (DILAUDID) injection 0.5 mg (0.5 mg Intravenous Given 1/13/21 2129)     Drips infusing:  Yes Heparin  For the majority of the shift, the patient's behavior Green. Interventions performed were None.    Sepsis treatment initiated: No     Patient tested for COVID 19 prior to admission: YES    ED Nurse Name/Phone Number: Nicolette Lou RN,   9:58 PM    RECEIVING UNIT ED HANDOFF REVIEW    Above ED Nurse Handoff Report was reviewed: Yes  Reviewed by: Kathy Cannon RN on January 13, 2021 at 10:29 PM

## 2021-01-14 NOTE — PLAN OF CARE
Patient alert and oriented, up with SBA to the bathroom. Patient denies any significant pain. PRN zofran given x1, did have one liquid emesis. Denies chest pain, tele is SR. Heparin infusing at 450unit/hr, recheck will be at 10:45am. Midline in place, functioning well. Has been NPO since 4am. Surgery and cardiology consulted. Continue with current plan of care.

## 2021-01-14 NOTE — CONSULTS
[facility]    Cardiology Consultation     Date of Admission:  1/13/2021  Date of Consult (When I saw the patient): 01/14/21    Assessment & Plan   Lisa Galloway is a 75 year old female with no prior cardiac hx, but hx of HL (not on statin therapy), metastatic breast CA, recently dx pancreatitis, likely from gallstones, GERD and asthma who was admitted on 1/13/2021 with recurrent abdominal pain. I was asked to see the patient for elevated trop.    1. Elevated trop, unclear etiology. Suspect NSTEMI type I or type II.   -No clear CV symptoms. EKG normal  -Only one trop on presentation so far, 2.8. Continue to trend trops. Trop added on this AM  -Echo pending  -Will need a further ischemic work up with either cardiac cath vs stress testing depending on what her echo shows/trop trend. Of course this is also complicated by the fact that she also likely need further GI procedures/GI surgery, unclear how urgent the need is at this time. GI has also been consulted.  -For now continue with medical therapy, agree with ASA, hep gtt, atorvastatin and metoprolol    2. Metastatic breast CA    3. Pancreatitis/Gallstones    4. Hypokalemia. K+ being replaced.    Will discuss with Dr Rudd.     Ariana Freitas PA-C      Code Status    No CPR- Do NOT Intubate    Reason for Consult   Reason for consult: I was asked by Dr Rodriguez to evaluate this patient for elevated troponin.    Primary Care Physician   Nichole Lira    Chief Complaint   Abdominal pain    History is obtained from the patient and electronic health record    History of Present Illness   Lisa Galloway is a 75 year old female with no prior cardiac hx, but hx of HL, metastatic breast CA, recently dx pancreatitis, likely from gallstones, GERD and asthma who was admitted on 1/13/2021 with recurrent abdominal pain. She was recently hospitalized her from 1/8-1/12 with acute pancreatitis, suspect gallstone etiology, no cardiac concerns  during that stay. She states she had recurrent abdominal pain very similar to what brought her in the first time and associated nausea, due to the recurrent symptoms she decided to come back. She denies any associated CP, diaphoresis or SOB. She has not noted any CP, STANTON or decreased exertional tolerance recently.     No prior hx of cardiac diagnoses. Reviewed Care Everywhere she had an echo in 2016 through  which was completely normal. She does have a hx of breast CA, with prior chemo, but no prior radiation.     Past Medical History    HL  GERD  Anxiety  Metastatic breast CA  Acute pancreatitis  RLS  Hydronephrosis s/p R ureteral stenting    Prior to Admission Medications   Prior to Admission Medications   Prescriptions Last Dose Informant Patient Reported? Taking?   Multiple Vitamins-Minerals (MULTIVITAMIN ADULT) TABS 1/12/2021 at Unknown time  Yes Yes   Sig: Take 1 tablet by mouth daily   acetaminophen (TYLENOL) 325 MG tablet 1/13/2021 at Unknown time  Yes Yes   Sig: Take 325-650 mg by mouth every 6 hours as needed for mild pain   donepezil (ARICEPT) 10 MG tablet 1/12/2021 at bedtime  Yes Yes   Sig: Take 10 mg by mouth At Bedtime   gabapentin (NEURONTIN) 300 MG capsule 1/12/2021 at bedtime  Yes Yes   Sig: Take 900 mg by mouth At Bedtime For restless leg syndrome    ondansetron (ZOFRAN) 4 MG tablet   Yes Yes   Sig: Take 4 mg by mouth every 8 hours as needed for nausea    ribociclib (KISQALI) 200 MG tablet   Yes Yes   Sig: Take 2 tablets (400 mg) by mouth daily for 21 days, followed by 7 days off in a 28 day cycle      Facility-Administered Medications: None     Allergies   Allergies   Allergen Reactions     Amoxicillin-Pot Clavulanate Itching     Erythromycin Nausea     Latex      PN: Latex Sensitivity Flag     Levofloxacin      PN: joint pain     Sulfa Drugs GI Disturbance     Oxycodone Rash       Social History   I have reviewed this patient's social history and updated it with pertinent information if  needed. Lisa Galloway  reports that she has never smoked. She has never used smokeless tobacco. She reports previous social alcohol use. She reports that she does not use drugs.    Family History   No pertinent family hx of CAD.    Review of Systems   The 10 point Review of Systems is negative other than noted in the HPI or here.     Physical Exam   Temp: 98.4  F (36.9  C) Temp src: Oral BP: (!) 161/69 Pulse: 93   Resp: 18 SpO2: 97 % O2 Device: None (Room air)    Vital Signs with Ranges  Temp:  [97.7  F (36.5  C)-98.4  F (36.9  C)] 98.4  F (36.9  C)  Pulse:  [64-93] 93  Resp:  [17-20] 18  BP: ()/(69-87) 161/69  SpO2:  [95 %-100 %] 97 %  151 lbs 1.6 oz    Constitutional     alert and oriented, in no acute distress.     Skin     warm and dry to touch    Lungs  Some crackles in the base, otherwise CTA     Cardiac  regular rhythm, no murmurs, no rubs    Extremities and Back     No LE edema observed.        Neurological     no gross motor deficits noted, affect appropriate, oriented to time, person and place.    Data   Results for orders placed or performed during the hospital encounter of 01/13/21 (from the past 24 hour(s))   EKG 12-lead, tracing only   Result Value Ref Range    Interpretation ECG Click View Image link to view waveform and result    UA with Microscopic   Result Value Ref Range    Color Urine Light Yellow     Appearance Urine Clear     Glucose Urine Negative NEG^Negative mg/dL    Bilirubin Urine Negative NEG^Negative    Ketones Urine 10 (A) NEG^Negative mg/dL    Specific Gravity Urine 1.016 1.003 - 1.035    Blood Urine Negative NEG^Negative    pH Urine 6.0 5.0 - 7.0 pH    Protein Albumin Urine 20 (A) NEG^Negative mg/dL    Urobilinogen mg/dL Normal 0.0 - 2.0 mg/dL    Nitrite Urine Negative NEG^Negative    Leukocyte Esterase Urine Moderate (A) NEG^Negative    Source Midstream Urine     WBC Urine 6 (H) 0 - 5 /HPF    RBC Urine 2 0 - 2 /HPF    Bacteria Urine Few (A) NEG^Negative /HPF    Squamous  Epithelial /HPF Urine <1 0 - 1 /HPF    Mucous Urine Present (A) NEG^Negative /LPF   Asymptomatic SARS-CoV-2 COVID-19 Virus (Coronavirus) by PCR    Specimen: Nasopharyngeal   Result Value Ref Range    SARS-CoV-2 Virus Specimen Source Nasopharyngeal     SARS-CoV-2 PCR Result NEGATIVE     SARS-CoV-2 PCR Comment (Note)    CBC with platelets differential   Result Value Ref Range    WBC 3.2 (L) 4.0 - 11.0 10e9/L    RBC Count 2.74 (L) 3.8 - 5.2 10e12/L    Hemoglobin 9.9 (L) 11.7 - 15.7 g/dL    Hematocrit 29.0 (L) 35.0 - 47.0 %     (H) 78 - 100 fl    MCH 36.1 (H) 26.5 - 33.0 pg    MCHC 34.1 31.5 - 36.5 g/dL    RDW 14.3 10.0 - 15.0 %    Platelet Count 207 150 - 450 10e9/L    Diff Method Automated Method     % Neutrophils 66.2 %    % Lymphocytes 19.4 %    % Monocytes 11.9 %    % Eosinophils 0.9 %    % Basophils 1.3 %    % Immature Granulocytes 0.3 %    Nucleated RBCs 0 0 /100    Absolute Neutrophil 2.1 1.6 - 8.3 10e9/L    Absolute Lymphocytes 0.6 (L) 0.8 - 5.3 10e9/L    Absolute Monocytes 0.4 0.0 - 1.3 10e9/L    Absolute Eosinophils 0.0 0.0 - 0.7 10e9/L    Absolute Basophils 0.0 0.0 - 0.2 10e9/L    Abs Immature Granulocytes 0.0 0 - 0.4 10e9/L    Absolute Nucleated RBC 0.0    Comprehensive metabolic panel   Result Value Ref Range    Sodium 142 133 - 144 mmol/L    Potassium 3.1 (L) 3.4 - 5.3 mmol/L    Chloride 111 (H) 94 - 109 mmol/L    Carbon Dioxide 26 20 - 32 mmol/L    Anion Gap 5 3 - 14 mmol/L    Glucose 94 70 - 99 mg/dL    Urea Nitrogen 6 (L) 7 - 30 mg/dL    Creatinine 0.99 0.52 - 1.04 mg/dL    GFR Estimate 56 (L) >60 mL/min/[1.73_m2]    GFR Estimate If Black 64 >60 mL/min/[1.73_m2]    Calcium 8.3 (L) 8.5 - 10.1 mg/dL    Bilirubin Total 0.5 0.2 - 1.3 mg/dL    Albumin 2.5 (L) 3.4 - 5.0 g/dL    Protein Total 5.7 (L) 6.8 - 8.8 g/dL    Alkaline Phosphatase 57 40 - 150 U/L    ALT 12 0 - 50 U/L    AST 31 0 - 45 U/L   Lipase   Result Value Ref Range    Lipase 747 (H) 73 - 393 U/L   Troponin I   Result Value Ref Range     Troponin I ES 2.852 () 0.000 - 0.045 ug/L   Partial thromboplastin time   Result Value Ref Range    PTT 34 22 - 37 sec   EKG 12-lead, tracing only   Result Value Ref Range    Interpretation ECG Click View Image link to view waveform and result    US Abdomen Limited    Narrative    EXAM: US ABDOMEN LIMITED  LOCATION: [facility]  DATE/TIME: 1/13/2021 10:00 PM    INDICATION: Right-sided abdominal pain. History of pancreatitis.  COMPARISON: CT abdomen and pelvis 01/08/2021  TECHNIQUE: Limited abdominal ultrasound.    FINDINGS:    GALLBLADDER: Small amount of sludge and tiny stones in the gallbladder. Wall thickness upper range of normal measuring 3 mm. Ascites in the right upper quadrant adjacent to the gallbladder.    BILE DUCTS: No biliary dilatation. The common duct measures 5 mm.    LIVER: Normal parenchyma with smooth contour. No focal mass.    RIGHT KIDNEY: Moderate right hydronephrosis    PANCREAS: Dilated pancreatic duct measuring 5 mm.    No ascites.      Impression    IMPRESSION:  1.  Small amount of sludge and stone material in the gallbladder. Wall thickness upper range of normal. Findings equivocal for acute cholecystitis.  2.  Ascites right upper and right lower quadrants.  3.  Slight dilatation of the pancreatic duct as seen on CT.  4.  Right hydronephrosis. This also was present on CT with ureteral stent in place.   Potassium   Result Value Ref Range    Potassium 3.2 (L) 3.4 - 5.3 mmol/L   CBC with platelets   Result Value Ref Range    WBC 3.6 (L) 4.0 - 11.0 10e9/L    RBC Count 2.70 (L) 3.8 - 5.2 10e12/L    Hemoglobin 9.9 (L) 11.7 - 15.7 g/dL    Hematocrit 28.8 (L) 35.0 - 47.0 %     (H) 78 - 100 fl    MCH 36.7 (H) 26.5 - 33.0 pg    MCHC 34.4 31.5 - 36.5 g/dL    RDW 14.4 10.0 - 15.0 %    Platelet Count 199 150 - 450 10e9/L   Heparin Unfractionated Anti Xa Level   Result Value Ref Range    Heparin Unfractionated Anti Xa Level 0.67 IU/mL   Potassium   Result Value Ref Range     Potassium 3.5 3.4 - 5.3 mmol/L   Lipase   Result Value Ref Range    Lipase 460 (H) 73 - 393 U/L   Lipid panel reflex to direct LDL   Result Value Ref Range    Cholesterol 145 <200 mg/dL    Triglycerides 101 <150 mg/dL    HDL Cholesterol 49 (L) >49 mg/dL    LDL Cholesterol Calculated 76 <100 mg/dL    Non HDL Cholesterol 96 <130 mg/dL     Echo: pending

## 2021-01-14 NOTE — ED PROVIDER NOTES
History   Chief Complaint  Vomiting and Abdominal Pain    HPI   Lisa Galloway is a 75 year old female with a history of cancer, GERD, hypertension, and acute epancreatitis who presents for evaluation of on going diffuse right-sided abdominal pain since her admission to [facility] on 01/08/2021 for acute pancreatitis and discharge on 01/12/2021. Lisa notes associated nausea and emesis. Lisa explains her last meal was at 8.5 hours ago and notes her pain was been relatively constant since her admission and discharge yesterday. She denies any migration of her pain. She denies any alcohol use. She denies any trauma. She denies any dysuria or diarrhea. She denies any infectious symptoms.     Workup from ED visit on 01/08/2021  CMP: Glucose 95, Creatinine: 1.21 (H), GFR: 44 (L), o/w WNL  CBC: WBC: 4.0, HGB: 11.6 (L), PLT: 267  Lipase: 1,206 (H)  Asymptomatic Covid-9 Virus PCR: Negative     CT abdomen pelvis w/o contrast from 01/08/2021  IMPRESSION:    1. Inflammation about the pancreatic head and/or descending duodenum, suspicious for acute pancreatitis versus duodenitis, including consideration for peptic ulcer disease. This results in a small amount of free fluid in the abdomen, without organized abscess or confluent ascites.  2. Apparent thickening of the distal esophagus. This is favored to represent decompressed stomach of previously demonstrated sliding esophageal hiatal hernia, but would consider outpatient upper endoscopy to exclude esophageal neoplasm. This is not an acute finding.  3. Trace cholelithiasis.  4. Again seen is widespread osteosclerotic metastatic disease.      US abdominal RUQ Organs 01/08/2021  IMPRESSION:  1. Cholelithiasis without convincing sonographic evidence of acute cholecystitis. Possible small amount of free fluid adjacent to the gallbladder is nonspecific and can be seen with aggressive hydration or an acute or chronic intra-abdominal process.  2. Mild right-sided  "hydronephrosis. The patient's right ureteral stent is not well seen.    Review of Systems   Constitutional: Negative for chills and fever.   HENT: Negative for congestion and sore throat.    Respiratory: Negative for cough and shortness of breath.    Cardiovascular: Negative for chest pain.   Gastrointestinal: Positive for abdominal pain.   Genitourinary: Negative.    All other systems reviewed and are negative.          Allergies:  Amoxicillin-Pot Clavulanate  Erythromycin  Latex  Levofloxacin  Sulfa Drugs  Oxycodone     Medications:  Kisqali     Past Medical History:    DAWSON  Complicated UTI  Hydronephrosis  Osteomyelitis  Carcinoma of breast metastatic to bone  Adenomatous polyp of colon  Anxiety  Lymphedema of arm  BCC of nose  Hypertension  Hyperlipidemia  DJD  GERD  Depression     Past Surgical History:    Tonsillectomy  Mastectomy     Family History:    Mother: Heart disease and failure     Social History:  Patient present to the ED alone.    Physical Exam     Patient Vitals for the past 24 hrs:   BP Temp Temp src Pulse Resp SpO2 Height Weight   01/13/21 2124 -- -- -- -- -- -- 1.575 m (5' 2\") 64 kg (141 lb)   01/13/21 2115 105/78 -- -- 69 -- 96 % -- --   01/13/21 2100 -- -- -- 65 -- 96 % -- --   01/13/21 2045 -- -- -- 64 -- 95 % -- --   01/13/21 2030 -- -- -- 67 -- 95 % -- --   01/13/21 2015 131/84 -- -- -- -- 99 % -- --   01/13/21 2000 (!) 148/83 -- -- 69 -- 99 % -- --   01/13/21 1833 126/87 97.7  F (36.5  C) Oral 77 17 100 % -- --       Physical Exam    General: The patient is alert, in no respiratory distress. Pallor.    HENT: Mucous membranes moist.    Cardiovascular: Regular rate and rhythm. Good pulses in all four extremities. Normal capillary refill and skin turgor.     Respiratory: Lungs are clear. No nasal flaring. No retractions. No wheezing, no crackles.    Gastrointestinal: Abdomen soft. No guarding, no rebound. No palpable hernias. Right-sided abdominal tenderness.    Musculoskeletal: No gross " deformity.     Skin: No rashes or petechiae.     Neurologic: The patient is alert and oriented x3. GCS 15. No testable cranial nerve deficit. Follows commands with clear and appropriate speech. Gives appropriate answers. Good strength in all extremities. No gross neurologic deficit. Gross sensation intact. Pupils are round and reactive. No meningismus.     Lymphatic: No cervical adenopathy. No lower extremity swelling.    Psychiatric: The patient is non-tearful.    Emergency Department Course   EKG  Indication: Chest Pain equivalent  Time: 2019  Rate 72 bpm. NH interval 154. QRS duration 84. QT/QTc 462/505.   NSR  Low voltage QRS  Septal infarct age undetermined  Abnormal ECG  Read time: 2039  Read by Ruben Longoria MD    EKG  Indication: Repeat  Time: 2123  Rate 63 bpm. NH interval 158. QRS duration 82. QT/QTc 486/497.   NSR  Low voltage QRS  Septal infarct age undetermined  Abnormal ECG  Read time: 2128  Read by: Ruben Longoria MD    Imaging:  Radiology findings were communicated with the patient who voiced understanding of the findings.    US Abdomen Limited    (Results Pending)     Readings per Radiology    Laboratory:  Laboratory findings were communicated with the patient who voiced understanding of the findings.    CBC: WBC: 3.2 (low), HGB: 9.9 (low), PLT: 207  CMP: Glucose 94, Potassium: 3.1 (low), Chloride: 111 (high), Urea Nitrogen: 6 (low), GFR: 56 (low), Calcium: 8.3 (low), Albumin: 2.5 (low), Protein Total: 5.7 (low), o/w WNL (Creatinine: 0.99)    Troponin (2037): 2.852 (high)  Lipase: 747 (high)  PTT: 34    Asymptomatic COVID-19 PCR:  Negative    Emergency Department Course:  Reviewed:  1923 I reviewed the patient's nursing notes, vitals, past medical records, Care Everywhere.     Assessments:  1928 I physically examined the patient as documented above.  2118 I re-assessed the patient and updated them on the results of their workup thus far.     Interventions:  2019 NS 1L IV  2020  Dilaudid 0.5 mg IV  2021 Compazine 5 mg IV  2128 aspirin 324 mg PO  2129 Dilaudid 0.5 mg IV  2152 Heparin loading dose 3,850 Units IV  2153 heparin 25,000 units IV    Disposition:  The patient was admitted to the hospital under the care of Dr. Rodriguez.     Impression & Plan   Covid-19  Lisa Galloway was evaluated during a global COVID-19 pandemic, which necessitated consideration that the patient might be at risk for infection with the SARS-CoV-2 virus that causes COVID-19.   Applicable protocols for evaluation were followed during the patient's care.   COVID-19 was considered as part of the patient's evaluation. The plan for testing is:  a test was obtained during this visit.    Medical Decision Making:  Lisa Galloway is a 75 year old female who presents with right-sided abdominal pain which the patient had mentioned was present when she was admitted for pancreatitis.  I did review her recent note when she was admitted the eighth the 12th and her lipase was in fact elevated during that time.  The patient did have tenderness over the abdomen and it appeared to be more lower I did have her consider this could still be cholecystitis or biliary colic.  However the patient's description of the discomfort ACS was on differential I checked her troponin and that troponin was quite elevated.  It may be that the abdominal pain was triggering cardiac ischemia.  I did treat her pain and her abdominal exam was benign I did not repeat a CT scan and I do not think is appendicitis.  I started her on aspirin and heparin a PICC line was placed and the patient was admitted for further cardiac work-up.  Her ultrasound today does demonstrate sludge and stone in the gallbladder without signs of cholecystitis there was slight dilatation of the pancreatic duct but the biggest concern this patient is the elevated troponin.    Critical care time is 45 minutes in exclusion of any procedures.    Diagnosis:    ICD-10-CM    1. ACS  (acute coronary syndrome) (H)  I24.9 Partial thromboplastin time   2. RLQ abdominal pain  R10.31    3. Acute pancreatitis, unspecified complication status, unspecified pancreatitis type  K85.90        Disposition:  Admitted to cardiac telemetry.    Scribe Disclosure:  I, Seven Sandoval, am serving as a scribe at 7:26 PM on 1/13/2021 to document services personally performed by Ruben Longoria MD based on my observations and the provider's statements to me.      Ruben Longoria MD  01/14/21 0004

## 2021-01-15 LAB
ANION GAP SERPL CALCULATED.3IONS-SCNC: 4 MMOL/L (ref 3–14)
BASOPHILS # BLD AUTO: 0.1 10E9/L (ref 0–0.2)
BASOPHILS NFR BLD AUTO: 1 %
BUN SERPL-MCNC: 11 MG/DL (ref 7–30)
CALCIUM SERPL-MCNC: 7.1 MG/DL (ref 8.5–10.1)
CHLORIDE SERPL-SCNC: 115 MMOL/L (ref 94–109)
CO2 SERPL-SCNC: 25 MMOL/L (ref 20–32)
CREAT SERPL-MCNC: 1.1 MG/DL (ref 0.52–1.04)
DIFFERENTIAL METHOD BLD: ABNORMAL
EOSINOPHIL # BLD AUTO: 0.1 10E9/L (ref 0–0.7)
EOSINOPHIL NFR BLD AUTO: 1 %
ERYTHROCYTE [DISTWIDTH] IN BLOOD BY AUTOMATED COUNT: 15.5 % (ref 10–15)
FERRITIN SERPL-MCNC: 463 NG/ML (ref 8–252)
FOLATE SERPL-MCNC: 8.5 NG/ML
GFR SERPL CREATININE-BSD FRML MDRD: 49 ML/MIN/{1.73_M2}
GLUCOSE BLDC GLUCOMTR-MCNC: 105 MG/DL (ref 70–99)
GLUCOSE BLDC GLUCOMTR-MCNC: 112 MG/DL (ref 70–99)
GLUCOSE BLDC GLUCOMTR-MCNC: 148 MG/DL (ref 70–99)
GLUCOSE BLDC GLUCOMTR-MCNC: 59 MG/DL (ref 70–99)
GLUCOSE BLDC GLUCOMTR-MCNC: 96 MG/DL (ref 70–99)
GLUCOSE SERPL-MCNC: 68 MG/DL (ref 70–99)
HCT VFR BLD AUTO: 26.5 % (ref 35–47)
HGB BLD-MCNC: 8.8 G/DL (ref 11.7–15.7)
IMM GRANULOCYTES # BLD: 0 10E9/L (ref 0–0.4)
IMM GRANULOCYTES NFR BLD: 0.3 %
IRON SATN MFR SERPL: 12 % (ref 15–46)
IRON SERPL-MCNC: 25 UG/DL (ref 35–180)
LYMPHOCYTES # BLD AUTO: 1.4 10E9/L (ref 0.8–5.3)
LYMPHOCYTES NFR BLD AUTO: 23.3 %
MCH RBC QN AUTO: 36.7 PG (ref 26.5–33)
MCHC RBC AUTO-ENTMCNC: 33.2 G/DL (ref 31.5–36.5)
MCV RBC AUTO: 110 FL (ref 78–100)
MONOCYTES # BLD AUTO: 0.5 10E9/L (ref 0–1.3)
MONOCYTES NFR BLD AUTO: 8 %
NEUTROPHILS # BLD AUTO: 4.1 10E9/L (ref 1.6–8.3)
NEUTROPHILS NFR BLD AUTO: 66.4 %
NRBC # BLD AUTO: 0 10*3/UL
NRBC BLD AUTO-RTO: 0 /100
PLATELET # BLD AUTO: 239 10E9/L (ref 150–450)
POTASSIUM SERPL-SCNC: 3.5 MMOL/L (ref 3.4–5.3)
RBC # BLD AUTO: 2.4 10E12/L (ref 3.8–5.2)
RETICS # AUTO: 34.8 10E9/L (ref 25–95)
RETICS/RBC NFR AUTO: 1.3 % (ref 0.5–2)
SODIUM SERPL-SCNC: 144 MMOL/L (ref 133–144)
TIBC SERPL-MCNC: 201 UG/DL (ref 240–430)
UFH PPP CHRO-ACNC: 0.29 IU/ML
UFH PPP CHRO-ACNC: 0.39 IU/ML
WBC # BLD AUTO: 6.2 10E9/L (ref 4–11)

## 2021-01-15 PROCEDURE — 83540 ASSAY OF IRON: CPT | Performed by: INTERNAL MEDICINE

## 2021-01-15 PROCEDURE — 85520 HEPARIN ASSAY: CPT | Performed by: INTERNAL MEDICINE

## 2021-01-15 PROCEDURE — 77412 RADIATION TX DELIVERY LVL 3: CPT

## 2021-01-15 PROCEDURE — 36415 COLL VENOUS BLD VENIPUNCTURE: CPT | Performed by: INTERNAL MEDICINE

## 2021-01-15 PROCEDURE — 258N000001 HC RX 258

## 2021-01-15 PROCEDURE — 85045 AUTOMATED RETICULOCYTE COUNT: CPT | Performed by: INTERNAL MEDICINE

## 2021-01-15 PROCEDURE — 99231 SBSQ HOSP IP/OBS SF/LOW 25: CPT | Performed by: SURGERY

## 2021-01-15 PROCEDURE — 258N000001 HC RX 258: Performed by: INTERNAL MEDICINE

## 2021-01-15 PROCEDURE — 80048 BASIC METABOLIC PNL TOTAL CA: CPT | Performed by: INTERNAL MEDICINE

## 2021-01-15 PROCEDURE — 99232 SBSQ HOSP IP/OBS MODERATE 35: CPT | Performed by: INTERNAL MEDICINE

## 2021-01-15 PROCEDURE — 82607 VITAMIN B-12: CPT | Performed by: INTERNAL MEDICINE

## 2021-01-15 PROCEDURE — 82746 ASSAY OF FOLIC ACID SERUM: CPT | Performed by: INTERNAL MEDICINE

## 2021-01-15 PROCEDURE — 83550 IRON BINDING TEST: CPT | Performed by: INTERNAL MEDICINE

## 2021-01-15 PROCEDURE — 999N001017 HC STATISTIC GLUCOSE BY METER IP

## 2021-01-15 PROCEDURE — 82728 ASSAY OF FERRITIN: CPT | Performed by: INTERNAL MEDICINE

## 2021-01-15 PROCEDURE — 120N000001 HC R&B MED SURG/OB

## 2021-01-15 PROCEDURE — 99233 SBSQ HOSP IP/OBS HIGH 50: CPT | Performed by: INTERNAL MEDICINE

## 2021-01-15 PROCEDURE — 258N000003 HC RX IP 258 OP 636: Performed by: INTERNAL MEDICINE

## 2021-01-15 PROCEDURE — 250N000013 HC RX MED GY IP 250 OP 250 PS 637: Performed by: INTERNAL MEDICINE

## 2021-01-15 PROCEDURE — 250N000011 HC RX IP 250 OP 636: Performed by: INTERNAL MEDICINE

## 2021-01-15 PROCEDURE — 85025 COMPLETE CBC W/AUTO DIFF WBC: CPT | Performed by: INTERNAL MEDICINE

## 2021-01-15 RX ORDER — NICOTINE POLACRILEX 4 MG
15-30 LOZENGE BUCCAL
Status: DISCONTINUED | OUTPATIENT
Start: 2021-01-15 | End: 2021-01-22 | Stop reason: HOSPADM

## 2021-01-15 RX ORDER — DEXTROSE MONOHYDRATE 25 G/50ML
INJECTION, SOLUTION INTRAVENOUS
Status: COMPLETED
Start: 2021-01-15 | End: 2021-01-15

## 2021-01-15 RX ORDER — DEXTROSE MONOHYDRATE, SODIUM CHLORIDE, AND POTASSIUM CHLORIDE 50; 1.49; 4.5 G/1000ML; G/1000ML; G/1000ML
INJECTION, SOLUTION INTRAVENOUS CONTINUOUS
Status: ACTIVE | OUTPATIENT
Start: 2021-01-15 | End: 2021-01-15

## 2021-01-15 RX ORDER — DEXTROSE MONOHYDRATE 25 G/50ML
25-50 INJECTION, SOLUTION INTRAVENOUS
Status: DISCONTINUED | OUTPATIENT
Start: 2021-01-15 | End: 2021-01-22 | Stop reason: HOSPADM

## 2021-01-15 RX ADMIN — ATORVASTATIN CALCIUM 40 MG: 40 TABLET, FILM COATED ORAL at 20:07

## 2021-01-15 RX ADMIN — DEXTROSE MONOHYDRATE 25 ML: 500 INJECTION PARENTERAL at 09:06

## 2021-01-15 RX ADMIN — DEXTROSE MONOHYDRATE 25 ML: 25 INJECTION, SOLUTION INTRAVENOUS at 09:06

## 2021-01-15 RX ADMIN — GABAPENTIN 900 MG: 300 CAPSULE ORAL at 21:23

## 2021-01-15 RX ADMIN — DONEPEZIL HYDROCHLORIDE 10 MG: 10 TABLET ORAL at 21:23

## 2021-01-15 RX ADMIN — METOPROLOL TARTRATE 25 MG: 25 TABLET, FILM COATED ORAL at 09:20

## 2021-01-15 RX ADMIN — Medication 1 MG: at 00:03

## 2021-01-15 RX ADMIN — ASPIRIN 81 MG: 81 TABLET, COATED ORAL at 09:21

## 2021-01-15 RX ADMIN — HEPARIN SODIUM 600 UNITS/HR: 10000 INJECTION, SOLUTION INTRAVENOUS at 08:03

## 2021-01-15 RX ADMIN — METOPROLOL TARTRATE 25 MG: 25 TABLET, FILM COATED ORAL at 20:07

## 2021-01-15 RX ADMIN — ACETAMINOPHEN 650 MG: 325 TABLET, FILM COATED ORAL at 00:03

## 2021-01-15 RX ADMIN — POTASSIUM CHLORIDE, DEXTROSE MONOHYDRATE AND SODIUM CHLORIDE: 150; 5; 450 INJECTION, SOLUTION INTRAVENOUS at 09:06

## 2021-01-15 ASSESSMENT — ACTIVITIES OF DAILY LIVING (ADL)
ADLS_ACUITY_SCORE: 18
ADLS_ACUITY_SCORE: 19
ADLS_ACUITY_SCORE: 18

## 2021-01-15 ASSESSMENT — MIFFLIN-ST. JEOR: SCORE: 1154.05

## 2021-01-15 NOTE — PROGRESS NOTES
[facility]  Hospitalist Progress Note  Reymundo Rodriguez MD, MD 01/15/2021  (Text Page)  Reason for Stay (Diagnosis): Elevated troponin type II AMI versus ACS  Gallstone pancreatitis         Assessment and Plan:      Summary of Stay: Lisa Galloway is a 75 year old female history of metastatic breast cancer with nauseous metastatic lesions noted on recent PET scan, GERD, mild intermittent asthma, anxiety, recent hospitalization for abdominal pain found with acute pancreatitis felt secondary to gallstone etiology with complications of mild DAWSON and was discharged + 1/12/2021 on good condition and stable hemodynamics.  She has a referral set up with  GI and general surgery given her primary issues of her last hospitalization with suspected gallstone induced pancreatitis.  At that time she demonstrated tolerance to oral intake and symptoms appears to be improving and subsequently discharged home.  He also presents back due to her recurrence of abdominal symptomatology    Problem List:     1. Abdominal pain  2. Gallstone pancreatitis  3. Elevated troponin I affected with underlying ACS  4. ? Cholecystitis  5. GERD  6.  History metastatic breast cancer  7.  History of bronchial asthma not in exacerbation  8.  Hypokalemia  9.  Stable Macrocytic anemia  10.  Newly diagnosed cardiomyopathy, stress-induced versus ischemic related  11.  Chronic kidney disease with baseline creatinine 1.2-1.3     Continue inpatient care.  Remain on telemetry monitoring.  Subsequent troponin I levels decreasing.  Overall symptomatology improving as well.  Highly appreciate prompt input from cardiology service.    -Echocardiogram revealed LVEF of 35 to 40%, hypokinesia at anterolateral and inferior wall   -Suspected stress cardiomyopathy   -However likely needs to rule out ischemic etiology as well   -Cardiology planning for further risk ratification likely a coronary angiogram versus stress testing in the next coming days  "  -May stop IV heparin anticoagulation   -Remain on metoprolol, aspirin, statins   -Not on ACE inhibitor due to soft BP levels  -General surgery following with us with plans for possible laparoscopic cholecystectomy once optimized from medicine and cardiology perspective  -Anemia work-up panel requested  -Patient still has decreased oral intake, earlier had some hypoglycemia.  -We will provide with gentle hydration for now given earlier hypoglycemia and hypokalemia.  Once tolerating more of oral diet this can be discontinued as patient also has-disposition for volume overload given this recent findings of cardiomyopathy    DVT Prophylaxis: IV heparin  Code Status: DNR / DNI  Discharge Dispo: home  Estimated Disch Date / # of Days until Disch: 2-3 days        Interval History (Subjective):      Continuing care today.  Seen and examined.  Chart reviewed.  No significant reported events overnight.  No recurrence of any nausea, vomiting.  Tolerating some of the liquid diet and denies any worsening abdominal pain.   Currently has no ongoing chest pain, shortness of breath, not hypoxic.  Afebrile.  No reported bleeding tendencies  -Mariana appears to be more interactive, conversant and pleasant today.  Slept decent overnight.  Following simple verbal instructions.             Physical Exam:      Last Vital Signs:  /61   Pulse 65   Temp 97.8  F (36.6  C) (Oral)   Resp 18   Ht 1.575 m (5' 2\")   Wt 70.6 kg (155 lb 9.6 oz)   SpO2 92%   BMI 28.46 kg/m      I/O last 3 completed shifts:  In: 240 [P.O.:240]  Out: -   Wt Readings from Last 1 Encounters:   01/15/21 70.6 kg (155 lb 9.6 oz)     Vitals:    01/13/21 2124 01/14/21 0641 01/15/21 0534   Weight: 64 kg (141 lb) 68.5 kg (151 lb 1.6 oz) 70.6 kg (155 lb 9.6 oz)       Constitutional: Awake, alert, cooperative, no apparent distress   Respiratory: Clear to auscultation bilaterally, no crackles or wheezing   Cardiovascular: Regular rate and rhythm, normal S1 and S2, and " no murmur noted   Abdomen: Normal bowel sounds, soft, non-distended, non-tender   Skin: No rashes, no cyanosis, dry to touch   Neuro: Alert and oriented x3, no weakness, spontaneous and coherent speech   Extremities: No edema, normal range of motion   Other(s): Euthymic mood, not agitated       All other systems: Negative          Medications:      All current medications were reviewed with changes reflected in problem list.         Data:      All new lab and imaging data was reviewed.   Labs:  No results for input(s): CULT in the last 168 hours.  Recent Labs   Lab 01/15/21  0545 01/14/21  0002 01/13/21 2037   WBC 6.2 3.6* 3.2*   HGB 8.8* 9.9* 9.9*   HCT 26.5* 28.8* 29.0*   * 107* 106*    199 207     Recent Labs   Lab 01/15/21  0545 01/14/21  0650 01/14/21  0002 01/13/21 2037 01/12/21  0801 01/11/21  1543 01/11/21  0619 01/10/21  0613 01/08/21  2218 01/08/21 2218     --   --  142  --   --   --  143   < > 138   POTASSIUM 3.5 3.5 3.2* 3.1*  --   --  3.6 3.5   < > 3.7   CHLORIDE 115*  --   --  111*  --   --   --  114*   < > 104   CO2 25  --   --  26  --   --   --  22   < > 25   ANIONGAP 4  --   --  5  --   --   --  7   < > 9   GLC 68*  --   --  94  --   --   --  55*   < > 95   BUN 11  --   --  6*  --   --   --  10   < > 15   CR 1.10*  --   --  0.99  --   --   --  1.05*   < > 1.21*   GFRESTIMATED 49*  --   --  56*  --   --   --  52*   < > 44*   GFRESTBLACK 57*  --   --  64  --   --   --  60*   < > 50*   EARL 7.1*  --   --  8.3*  --   --   --  7.1*   < > 9.5   MAG  --   --   --   --  2.0 1.5* 1.4* 1.6   < >  --    PROTTOTAL  --   --   --  5.7*  --   --   --   --   --  7.8   ALBUMIN  --   --   --  2.5*  --   --   --   --   --  3.7   BILITOTAL  --   --   --  0.5  --   --   --   --   --  0.7   ALKPHOS  --   --   --  57  --   --   --   --   --  84   AST  --   --   --  31  --   --   --   --   --  20   ALT  --   --   --  12  --   --   --   --   --  9    < > = values in this interval not displayed.     No  results for input(s): DD in the last 168 hours.  No results for input(s): SED, CRP in the last 168 hours.  Recent Labs   Lab 01/15/21  1150 01/15/21  0912 01/15/21  0840 01/15/21  0545 01/13/21 2037 01/10/21  0613 01/09/21  0648 01/08/21  2218   GLC  --   --   --  68* 94 55* 123* 95   * 105* 59*  --   --   --   --   --      No results for input(s): INR in the last 168 hours.  Recent Labs   Lab 01/14/21  0650 01/13/21 2037 01/11/21  0619   LIPASE 460* 747* 646*     Recent Labs   Lab 01/14/21  1214 01/14/21  0650 01/13/21 2037   TROPI 1.202* 1.733* 2.852*     No results for input(s): TSH in the last 168 hours.  Recent Labs   Lab 01/13/21 2024   COLOR Light Yellow   APPEARANCE Clear   URINEGLC Negative   URINEBILI Negative   URINEKETONE 10*   SG 1.016   UBLD Negative   URINEPH 6.0   PROTEIN 20*   NITRITE Negative   LEUKEST Moderate*   RBCU 2   WBCU 6*      Imaging:   Results for orders placed or performed during the hospital encounter of 01/13/21   US Abdomen Limited    Narrative    EXAM: US ABDOMEN LIMITED  LOCATION: [facility]  DATE/TIME: 1/13/2021 10:00 PM    INDICATION: Right-sided abdominal pain. History of pancreatitis.  COMPARISON: CT abdomen and pelvis 01/08/2021  TECHNIQUE: Limited abdominal ultrasound.    FINDINGS:    GALLBLADDER: Small amount of sludge and tiny stones in the gallbladder. Wall thickness upper range of normal measuring 3 mm. Ascites in the right upper quadrant adjacent to the gallbladder.    BILE DUCTS: No biliary dilatation. The common duct measures 5 mm.    LIVER: Normal parenchyma with smooth contour. No focal mass.    RIGHT KIDNEY: Moderate right hydronephrosis    PANCREAS: Dilated pancreatic duct measuring 5 mm.    No ascites.      Impression    IMPRESSION:  1.  Small amount of sludge and stone material in the gallbladder. Wall thickness upper range of normal. Findings equivocal for acute cholecystitis.  2.  Ascites right upper and right lower quadrants.  3.   Slight dilatation of the pancreatic duct as seen on CT.  4.  Right hydronephrosis. This also was present on CT with ureteral stent in place.

## 2021-01-15 NOTE — PROVIDER NOTIFICATION
RN - Notified assigned hospitalist of hypoglycemia this AM and pt recently changing to NPO status. Awaiting orders.

## 2021-01-15 NOTE — PLAN OF CARE
VSS, c/o 2/10 right sided abdominal pain, tylenol given. Denies SOB, CP, nausea. A&Ox4. LS diminished. Tele SR w/ inverted T, prolonged QT. Midline catheter with heparin infusing @600 units/hr, NS @75 ml/hr. Full liquid diet, tolerating well. Up with SBA.

## 2021-01-15 NOTE — PROGRESS NOTES
[facility]    Cardiology Progress Note    Date of Service (when I saw the patient): 01/15/2021     Assessment & Plan    Lisa Galloway is a 75 year old female with no prior cardiac hx, but hx of HL (not on statin therapy), metastatic breast CA, recently dx pancreatitis, likely from gallstones, GERD and asthma who was admitted on 1/13/2021 with recurrent abdominal pain. Cardiology consulted due to elevated troponin.     1. Elevated trop, unclear etiology. Suspect NSTEMI type I or type II. TTE shows LVEF 35-40% with distal anterolateral wall and inferior wall are hypokinetic and the apex is dyskinetic. Suspect a stress CM, but she does need an ischemic evaluation.  -No clear CV symptoms. EKG normal  -Trop on presentation 2.8. then down trended.   -Ideally needs an ischemic work up with a cardiac cath, but since hgb is down a bit today we want to make sure she/it is stable prior to proceeding due to the need for AC during the procedure and possible need for DAPT if a stent is placed. Also to be noted, it does appear surgery is recommending a lap felecia, per the consult note and my discussion with the hospitalist this is not emergent and her cardiac work up should be completed first. If does have significant disease we will have to determine the best PCI with this consideration in mind.   -Stopped hep gtt as trop is decreasing, no anginal symptoms and hgb down a bit.  -For now continue with medical therapy, agree with ASA, atorvastatin and metoprolol     2. Metastatic breast CA. Prior chemo, but no prior chest radiation per pt.      3. Pancreatitis/Gallstones     4. Hypokalemia. K+ replaced. Stable.     DARLINE Brian-C      Interval History   No CV complaints.     Physical Exam   Temp: 97.8  F (36.6  C) Temp src: Oral BP: 115/61 Pulse: 65   Resp: 18 SpO2: 92 % O2 Device: None (Room air)    Vitals:    01/13/21 2124 01/14/21 0641 01/15/21 0534   Weight: 64 kg (141 lb) 68.5 kg (151 lb  1.6 oz) 70.6 kg (155 lb 9.6 oz)     Vital Signs with Ranges  Temp:  [97.8  F (36.6  C)-98.9  F (37.2  C)] 97.8  F (36.6  C)  Pulse:  [65-78] 65  Resp:  [18] 18  BP: ()/(57-62) 115/61  SpO2:  [90 %-94 %] 92 %  I/O last 3 completed shifts:  In: 240 [P.O.:240]  Out: -     Constitutional     alert and oriented, in no acute distress.     Skin     warm and dry to touch    Lungs  clear to auscultation     Cardiac  regular rhythm, no murmurs, no rubs    Abdomen     abdomen soft, bowel sounds normoactive, no hepatosplenomegaly    Extremities and Back     no clubbing, cyanosis. No edema observed.        Neurological     no gross motor deficits noted, affect appropriate, oriented to time, person and place.      Medications     dextrose 5% and 0.45% NaCl + KCl 20 mEq/L 100 mL/hr at 01/15/21 0906     heparin 600 Units/hr (01/15/21 0803)     - MEDICATION INSTRUCTIONS -         aspirin  81 mg Oral Daily     atorvastatin  40 mg Oral QPM     donepezil  10 mg Oral At Bedtime     gabapentin  900 mg Oral At Bedtime     metoprolol tartrate  25 mg Oral BID     sodium chloride (PF)  10 mL Intracatheter Q8H     sodium chloride (PF)  3 mL Intracatheter Q8H       Data   Most Recent 3 CBC's:  Recent Labs   Lab Test 01/15/21  0545 01/14/21  0002 01/13/21 2037   WBC 6.2 3.6* 3.2*   HGB 8.8* 9.9* 9.9*   * 107* 106*    199 207     Most Recent 3 BMP's:  Recent Labs   Lab Test 01/15/21  0545 01/14/21  0650 01/14/21  0002 01/13/21  2037 01/10/21  0613 01/10/21  0613     --   --  142  --  143   POTASSIUM 3.5 3.5 3.2* 3.1*   < > 3.5   CHLORIDE 115*  --   --  111*  --  114*   CO2 25  --   --  26  --  22   BUN 11  --   --  6*  --  10   CR 1.10*  --   --  0.99  --  1.05*   ANIONGAP 4  --   --  5  --  7   EARL 7.1*  --   --  8.3*  --  7.1*   GLC 68*  --   --  94  --  55*    < > = values in this interval not displayed.     Most Recent 3 Troponin's:  Recent Labs   Lab Test 01/14/21  1214 01/14/21  0650 01/13/21 2037   TROPI 1.202*  "1.733* 2.852*     Most Recent 3 BNP's:No lab results found.  Most Recent TSH and T4:No lab results found.  Most Recent Hemoglobin A1c:No lab results found.    Echocardiology:  There is apical dyskinesis.  Left ventricular systolic function is moderately reduced.  The visual ejection fraction is estimated at 35-40%.  The left atrium is mildly dilated.  Right ventricular systolic pressure is elevated, consistent with mild to moderate pulmonary hypertension.  Doppler interrogation does not demonstrate signficant stenois or insufficiency involving cardiac valves.     The base is hyperdynamic while the distal anterolateral wall and inferior wall are hypokinetic and the apex is dyskinetic. The distribution of the wall motion abnormalities is highly suggestive of \"Takotsubo cardiomyopathy\"  "

## 2021-01-15 NOTE — PROGRESS NOTES
"[facility]  General Surgery Progress Note           Assessment and Plan:   Assessment:   -Pancreatitis, abd pain; no derangement of LFTs  -Imaging reveals tiny gallstones, wall thickness at 3mm -equivocal for acute cholecystitis  -Elevated troponins (improving), Echo done; cardiology eval ongoing  -Afebrile  -Hgb 8.8 today  -Hypoglycemia this morning, Gluc 68      Plan:   -NPO  -Plan laparoscopic cholecystectomy pending cardiology/hospitalist clearance, OR availability         Interval History:   Patient feeling wiped out and tired, low glucose, receiving treatment now.  Continues to have RUQ pain, constant.  Recently changed to NPO status.  Will need to check OR schedule/availability for possible surgery.         Physical Exam:   Blood pressure 97/57, pulse 66, temperature 97.8  F (36.6  C), temperature source Oral, resp. rate 18, height 1.575 m (5' 2\"), weight 70.6 kg (155 lb 9.6 oz), SpO2 92 %, not currently breastfeeding.    I/O last 3 completed shifts:  In: 240 [P.O.:240]  Out: -     Constitutional:   Sleepy but opens eyes to talk, cooperative, no apparent distress, and appears stated age     Abdomen:   Normal bowel sounds, non-distended, +tender at RUQ at costal margin.  No tenderness at epigastrium.             Data:     Recent Labs   Lab 01/15/21  0545 01/14/21  0002 01/13/21 2037   WBC 6.2 3.6* 3.2*   HGB 8.8* 9.9* 9.9*   HCT 26.5* 28.8* 29.0*   * 107* 106*    199 207     Recent Labs   Lab 01/15/21  0545 01/14/21  0650 01/14/21  0002 01/13/21 2037 01/12/21  0801 01/11/21  1543 01/11/21  0619 01/10/21  0613 01/08/21 2218 01/08/21 2218     --   --  142  --   --   --  143   < > 138   POTASSIUM 3.5 3.5 3.2* 3.1*  --   --  3.6 3.5   < > 3.7   CHLORIDE 115*  --   --  111*  --   --   --  114*   < > 104   CO2 25  --   --  26  --   --   --  22   < > 25   ANIONGAP 4  --   --  5  --   --   --  7   < > 9   GLC 68*  --   --  94  --   --   --  55*   < > 95   BUN 11  --   --  6*  --   " --   --  10   < > 15   CR 1.10*  --   --  0.99  --   --   --  1.05*   < > 1.21*   GFRESTIMATED 49*  --   --  56*  --   --   --  52*   < > 44*   GFRESTBLACK 57*  --   --  64  --   --   --  60*   < > 50*   EARL 7.1*  --   --  8.3*  --   --   --  7.1*   < > 9.5   MAG  --   --   --   --  2.0 1.5* 1.4* 1.6   < >  --    PROTTOTAL  --   --   --  5.7*  --   --   --   --   --  7.8   ALBUMIN  --   --   --  2.5*  --   --   --   --   --  3.7   BILITOTAL  --   --   --  0.5  --   --   --   --   --  0.7   ALKPHOS  --   --   --  57  --   --   --   --   --  84   AST  --   --   --  31  --   --   --   --   --  20   ALT  --   --   --  12  --   --   --   --   --  9    < > = values in this interval not displayed.     Recent Labs   Lab 01/14/21  0650 01/13/21 2037 01/11/21  0619   LIPASE 460* 747* 646*     Recent Labs   Lab 01/14/21  1214 01/14/21  0650 01/13/21 2037   TROPI 1.202* 1.733* 2.852*     1/13/21 US ABDOMEN LIMITED  IMPRESSION:  1.  Small amount of sludge and stone material in the gallbladder. Wall thickness upper range of normal. Findings equivocal for acute cholecystitis.  2.  Ascites right upper and right lower quadrants.  3.  Slight dilatation of the pancreatic duct as seen on CT.  4.  Right hydronephrosis. This also was present on CT with ureteral stent in place.    Recent outside CT scan showed inflammation around the head of the pancreas and near the duodenum.  This was felt to represent either pancreatitis or duodenal ulcer.      Tiarra Singleton PA-C     Patient denies any significant pain at this time.  My exam does reveal some slight epigastric tenderness, but no obvious right upper quadrant tenderness.  Cardiology notes are reviewed.  Appears that they feel that the patient should undergo cardiac work-up before undergoing any surgery.  There is certainly no urgent need for surgery at this time.  We will be available once the patient has been cleared from a cardiac standpoint.    Gilles Ace MD  [facility]

## 2021-01-15 NOTE — CONSULTS
"CLINICAL NUTRITION SERVICES  -  ASSESSMENT NOTE  Recommendations Ordered by Registered Dietitian (RD):     Oral nutrition supplements prn   Malnutrition: unable to determine due to incomplete diet history, nutrition focused physical exam      REASON FOR ASSESSMENT  Lisa Galloway is a 75 year old female seen by Registered Dietitian for positive malnutrition risk screen    History of metastatic breast cancer with nauseous metastatic lesions noted on recent PET scan, GERD, mild intermittent asthma, anxiety, recent hospitalization for abdominal pain found with acute pancreatitis felt secondary to gallstone etiology with complications of mild DAWSON and was discharged + 2021 on good condition and stable hemodynamics.    NUTRITION HISTORY    Information obtained from chart review - patient sleeping 1/15 and  during attempts to see    Past RD notes 21 admit, non severe malnutrition criteria met 21:  - Information obtained from patient and chart.  - Diet at home: Regular.  - Usual intakes: Meals BID-TID.  - Barriers to PO intakes: Decreased appetite during a period of a \"few days\" PTA.  Otherwise was eating at baseline per report.    - Allergies: NKFA.    CURRENT NUTRITION ORDERS    Diet: Regular    Supplement: none   Current Intake/Tolerance:    Per flow sheet review, % intake for documented meals.     Factors affecting nutrition intake include: nausea, altered GI function    NUTRITION FOCUSED PHYSICAL ASSESSMENT FOR DIAGNOSING MALNUTRITION + PHYSICAL FINDINGS  Completed and Observed: No, deferred due to patient availability   Obtained from Chart/Interdisciplinary Team    Milton nutrition score: 2; total score: 20    Last BM: , abdominal discomfort    Skin: pale, dry, bruising     Generalized weakness    Edema: none    Temp (24hrs), Av.4  F (36.9  C), Min:97.8  F (36.6  C), Max:98.9  F (37.2  C)    I/O last 3 completed shifts:    In: 240 [P.O.:240]    Out: -   ANTHROPOMETRICS  Height: 5' " "2\"  Weight: 70 kg   Body mass index is 28.46 kg/m .  Weight Status:  Overweight BMI 25-29.9  Weight History:  Wt Readings from Last 10 Encounters:   01/15/21 70.6 kg (155 lb 9.6 oz)   01/09/21 64 kg (141 lb)       ASSESSED NUTRITION NEEDS (PER APPROVED PRACTICE GUIDELINES, Dosing weight: 69 kg):  Estimated Energy Needs: 0901-4386 kcals (25-30 Kcal/Kg)   Justification: maintenance   Estimated Protein Needs:  grams protein (1-1.5 g pro/Kg)   Justification: hypercatabolism with acute illness   Estimated Fluid Needs: per MD     LABS  Labs reviewed  Electrolytes  Potassium (mmol/L)   Date Value   01/15/2021 3.5   01/14/2021 3.5   01/14/2021 3.2 (L)    Blood Glucose  Glucose (mg/dL)   Date Value   01/15/2021 68 (L)   01/13/2021 94   01/10/2021 55 (L)   01/09/2021 123 (H)   01/08/2021 95    Inflammatory Markers  WBC (10e9/L)   Date Value   01/15/2021 6.2   01/14/2021 3.6 (L)     Albumin (g/dL)   Date Value   01/13/2021 2.5 (L)   01/08/2021 3.7      Magnesium (mg/dL)   Date Value   01/12/2021 2.0   01/11/2021 1.5 (L)   01/11/2021 1.4 (L)     Sodium (mmol/L)   Date Value   01/15/2021 144   01/13/2021 142   01/10/2021 143    Renal  Urea Nitrogen (mg/dL)   Date Value   01/15/2021 11   01/13/2021 6 (L)   01/10/2021 10     Creatinine (mg/dL)   Date Value   01/15/2021 1.10 (H)   01/13/2021 0.99   01/10/2021 1.05 (H)     Additional  Triglycerides (mg/dL)   Date Value   01/14/2021 101   01/09/2021 120     Ketones Urine (mg/dL)   Date Value   01/13/2021 10 (A)        MEDICATIONS    Medications reviewed    aspirin  81 mg Oral Daily     atorvastatin  40 mg Oral QPM     donepezil  10 mg Oral At Bedtime     gabapentin  900 mg Oral At Bedtime     metoprolol tartrate  25 mg Oral BID          dextrose 5% and 0.45% NaCl + KCl 20 mEq/L 100 mL/hr at 01/15/21 0906      D5 IVF at 100 mL per hour provides 408 kcal, 120 gm CHO    NUTRITION DIAGNOSIS:  Predicted inadequate nutrient intake (protein energy) related to altered GI function, " repeat hospitalization as evidenced by diet recall    INTERVENTIONS  Recommendations / Nutrition Prescription  Continue regular diet as ordered per MD + oral nutrition supplements to increase calorie and protein intake    Implementation  Nutrition education: deferred  Medical food supplement: Boost prn  Collaboration and Referral of care: Discussed patient during interdisciplinary care rounds M-F    Goals  Patient to consume >/= 50-75% of meals TID or oral nutrition supplements equivalent    MONITORING AND EVALUATION:  Progress towards goals will be monitored and evaluated per protocol and Practice Guidelines      Nayeli Coles MS, RDN-AP, LD, CNSC  Pager - 3rd floor/ICU: [phone]  Pager - All other floors: [phone]  Pager - Weekend/holiday: [phone]  Office: [phone]

## 2021-01-15 NOTE — PLAN OF CARE
RN - VSS. Pt c/o only mild tenderness at abdomen otherwise denying pain. Tele SR with prolong QT and inverted T waves. Pt mostly sleeping between cares but A&Ox4. Pt up with SBA voiding x1. Tolerating full liquid diet - adv to regular. Blood glucose low this AM - asymptomatic - receiving D50. IVF changed. Intial plan for further cardiology workup for surgery - utimately put on hold. Heparin gtt stopped. Pt likely expecting angiogram next week and possible lap felecia following.

## 2021-01-16 LAB
ANION GAP SERPL CALCULATED.3IONS-SCNC: 2 MMOL/L (ref 3–14)
BASOPHILS # BLD AUTO: 0.1 10E9/L (ref 0–0.2)
BASOPHILS NFR BLD AUTO: 1.2 %
BUN SERPL-MCNC: 10 MG/DL (ref 7–30)
CALCIUM SERPL-MCNC: 7.5 MG/DL (ref 8.5–10.1)
CHLORIDE SERPL-SCNC: 115 MMOL/L (ref 94–109)
CO2 SERPL-SCNC: 26 MMOL/L (ref 20–32)
CREAT SERPL-MCNC: 0.93 MG/DL (ref 0.52–1.04)
DIFFERENTIAL METHOD BLD: ABNORMAL
EOSINOPHIL # BLD AUTO: 0.1 10E9/L (ref 0–0.7)
EOSINOPHIL NFR BLD AUTO: 1.6 %
ERYTHROCYTE [DISTWIDTH] IN BLOOD BY AUTOMATED COUNT: 15.9 % (ref 10–15)
GFR SERPL CREATININE-BSD FRML MDRD: 60 ML/MIN/{1.73_M2}
GLUCOSE BLDC GLUCOMTR-MCNC: 102 MG/DL (ref 70–99)
GLUCOSE BLDC GLUCOMTR-MCNC: 107 MG/DL (ref 70–99)
GLUCOSE BLDC GLUCOMTR-MCNC: 115 MG/DL (ref 70–99)
GLUCOSE BLDC GLUCOMTR-MCNC: 77 MG/DL (ref 70–99)
GLUCOSE BLDC GLUCOMTR-MCNC: 89 MG/DL (ref 70–99)
GLUCOSE BLDC GLUCOMTR-MCNC: 93 MG/DL (ref 70–99)
GLUCOSE BLDC GLUCOMTR-MCNC: 97 MG/DL (ref 70–99)
GLUCOSE BLDC GLUCOMTR-MCNC: 98 MG/DL (ref 70–99)
GLUCOSE SERPL-MCNC: 124 MG/DL (ref 70–99)
HCT VFR BLD AUTO: 26.3 % (ref 35–47)
HGB BLD-MCNC: 8.8 G/DL (ref 11.7–15.7)
IMM GRANULOCYTES # BLD: 0 10E9/L (ref 0–0.4)
IMM GRANULOCYTES NFR BLD: 0.4 %
INR PPP: 1.14 (ref 0.86–1.14)
LYMPHOCYTES # BLD AUTO: 1 10E9/L (ref 0.8–5.3)
LYMPHOCYTES NFR BLD AUTO: 20.2 %
MCH RBC QN AUTO: 36.7 PG (ref 26.5–33)
MCHC RBC AUTO-ENTMCNC: 33.5 G/DL (ref 31.5–36.5)
MCV RBC AUTO: 110 FL (ref 78–100)
MONOCYTES # BLD AUTO: 0.5 10E9/L (ref 0–1.3)
MONOCYTES NFR BLD AUTO: 10.6 %
NEUTROPHILS # BLD AUTO: 3.2 10E9/L (ref 1.6–8.3)
NEUTROPHILS NFR BLD AUTO: 66 %
NRBC # BLD AUTO: 0 10*3/UL
NRBC BLD AUTO-RTO: 0 /100
PLATELET # BLD AUTO: 261 10E9/L (ref 150–450)
POTASSIUM SERPL-SCNC: 3.4 MMOL/L (ref 3.4–5.3)
RBC # BLD AUTO: 2.4 10E12/L (ref 3.8–5.2)
SODIUM SERPL-SCNC: 143 MMOL/L (ref 133–144)
VIT B12 SERPL-MCNC: 1527 PG/ML (ref 193–986)
WBC # BLD AUTO: 4.9 10E9/L (ref 4–11)

## 2021-01-16 PROCEDURE — 85025 COMPLETE CBC W/AUTO DIFF WBC: CPT | Performed by: INTERNAL MEDICINE

## 2021-01-16 PROCEDURE — 250N000013 HC RX MED GY IP 250 OP 250 PS 637: Performed by: INTERNAL MEDICINE

## 2021-01-16 PROCEDURE — 99233 SBSQ HOSP IP/OBS HIGH 50: CPT | Performed by: INTERNAL MEDICINE

## 2021-01-16 PROCEDURE — 250N000011 HC RX IP 250 OP 636: Performed by: INTERNAL MEDICINE

## 2021-01-16 PROCEDURE — 999N001017 HC STATISTIC GLUCOSE BY METER IP

## 2021-01-16 PROCEDURE — 80048 BASIC METABOLIC PNL TOTAL CA: CPT | Performed by: INTERNAL MEDICINE

## 2021-01-16 PROCEDURE — 120N000001 HC R&B MED SURG/OB

## 2021-01-16 PROCEDURE — 85610 PROTHROMBIN TIME: CPT | Performed by: INTERNAL MEDICINE

## 2021-01-16 RX ORDER — DEXTROMETHORPHAN POLISTIREX 30 MG/5ML
30 SUSPENSION ORAL EVERY 12 HOURS SCHEDULED
Status: DISCONTINUED | OUTPATIENT
Start: 2021-01-17 | End: 2021-01-22 | Stop reason: HOSPADM

## 2021-01-16 RX ORDER — POTASSIUM CHLORIDE 1500 MG/1
40 TABLET, EXTENDED RELEASE ORAL ONCE
Status: COMPLETED | OUTPATIENT
Start: 2021-01-16 | End: 2021-01-16

## 2021-01-16 RX ADMIN — ENOXAPARIN SODIUM 40 MG: 40 INJECTION SUBCUTANEOUS at 09:02

## 2021-01-16 RX ADMIN — ATORVASTATIN CALCIUM 40 MG: 40 TABLET, FILM COATED ORAL at 20:53

## 2021-01-16 RX ADMIN — DONEPEZIL HYDROCHLORIDE 10 MG: 10 TABLET ORAL at 20:53

## 2021-01-16 RX ADMIN — POTASSIUM CHLORIDE 40 MEQ: 1500 TABLET, EXTENDED RELEASE ORAL at 20:53

## 2021-01-16 RX ADMIN — GABAPENTIN 900 MG: 300 CAPSULE ORAL at 20:53

## 2021-01-16 RX ADMIN — METOPROLOL TARTRATE 25 MG: 25 TABLET, FILM COATED ORAL at 20:53

## 2021-01-16 RX ADMIN — ASPIRIN 81 MG: 81 TABLET, COATED ORAL at 09:01

## 2021-01-16 RX ADMIN — Medication 1 MG: at 20:53

## 2021-01-16 RX ADMIN — METOPROLOL TARTRATE 25 MG: 25 TABLET, FILM COATED ORAL at 09:01

## 2021-01-16 ASSESSMENT — ACTIVITIES OF DAILY LIVING (ADL)
ADLS_ACUITY_SCORE: 18

## 2021-01-16 ASSESSMENT — MIFFLIN-ST. JEOR: SCORE: 1165.39

## 2021-01-16 NOTE — PLAN OF CARE
Pt. A&Ox4, up with assist of 1, Tele: SR. Midline to left arm intact, PIV to left arm intact. Lung sounds diminished, room air sat's at 97%. BGL at IY=343, 0200=98, pt. Given juice but fell asleep and forgot to drink. Recheck=89, Pt. Given juice and crackers. Recheck BGL up to 115. Pt. States she has been passing flatus, abdomen tender, declines medication for pain. Plan for possible Angiogram on Monday and then will possibly do lap/felecia. Pt. Denies any other needs at this time. Will continue with POC

## 2021-01-16 NOTE — PROGRESS NOTES
[facility]  Hospitalist Progress Note  Reymundo Rodriguez MD, MD 01/16/2021  (Text Page)  Reason for Stay (Diagnosis): Elevated troponin type II AMI versus ACS  Gallstone pancreatitis         Assessment and Plan:      Summary of Stay: Lisa Galloway is a 75 year old female history of metastatic breast cancer with nauseous metastatic lesions noted on recent PET scan, GERD, mild intermittent asthma, anxiety, recent hospitalization for abdominal pain found with acute pancreatitis felt secondary to gallstone etiology with complications of mild DAWSON and was discharged + 1/12/2021 on good condition and stable hemodynamics.  She has a referral set up with  GI and general surgery given her primary issues of her last hospitalization with suspected gallstone induced pancreatitis.  At that time she demonstrated tolerance to oral intake and symptoms appears to be improving and subsequently discharged home.  He also presents back due to her recurrence of abdominal symptomatology    Problem List:     1. Abdominal pain  2.  Recent gallstone pancreatitis  3. Elevated troponin I  suspected with underlying ACS  4. ? Cholecystitis  5. GERD  6.  History metastatic breast cancer  7.  History of bronchial asthma not in exacerbation  8.  Hypokalemia  9.  Stable Macrocytic anemia  10.  Newly diagnosed cardiomyopathy, stress-induced versus ischemic related  11.  Chronic kidney disease with baseline creatinine 1.2-1.3-better creatinine levels today at 0.9     Continue inpatient care.  Remain on telemetry monitoring.  Subsequent troponin I levels decreasing.  Overall symptomatology improving as well.  Highly appreciate prompt input from cardiology service.    -Echocardiogram revealed LVEF of 35 to 40%, hypokinesia at anterolateral and inferior wall   -Suspected stress cardiomyopathy   -However likely needs to rule out ischemic etiology as well   -Cardiology planning for further risk ratification likely a coronary angiogram  "versus stress testing in the next coming days   -IV heparin discontinued  -Remain on metoprolol, aspirin, statins   -Not on ACE inhibitor due to soft BP levels  -General surgery following with us with plans for possible laparoscopic cholecystectomy once optimized from medicine and cardiology perspective  -Anemia work-up panel requested  -Oral intake improving, hypokalemia much improved  -Discontinue IV fluid support    DVT Prophylaxis: IV heparin  Code Status: DNR / DNI  Discharge Dispo: home  Estimated Disch Date / # of Days until Disch: 2 days        Interval History (Subjective):      Continuing care today.  Seen and examined.  Chart reviewed.  No significant reported events overnight.  No recurrence of any nausea, vomiting.  Tolerating some of the liquid diet and denies any worsening abdominal pain.   Currently has no ongoing chest pain, shortness of breath, not hypoxic.  Afebrile.  No reported bleeding tendencies  -Mariana appears to be more interactive, conversant and pleasant today.  Slept decent overnight.  Following simple verbal instructions.             Physical Exam:      Last Vital Signs:  BP (!) 146/74 (BP Location: Left leg)   Pulse 77   Temp 98.6  F (37  C) (Oral)   Resp 18   Ht 1.575 m (5' 2\")   Wt 71.7 kg (158 lb 1.6 oz)   SpO2 96%   BMI 28.92 kg/m      I/O last 3 completed shifts:  In: 360 [P.O.:360]  Out: -   Wt Readings from Last 1 Encounters:   01/16/21 71.7 kg (158 lb 1.6 oz)     Vitals:    01/13/21 2124 01/14/21 0641 01/15/21 0534 01/16/21 0443   Weight: 64 kg (141 lb) 68.5 kg (151 lb 1.6 oz) 70.6 kg (155 lb 9.6 oz) 71.7 kg (158 lb 1.6 oz)       Constitutional: Awake, alert, cooperative, no apparent distress   Respiratory: Clear to auscultation bilaterally, no crackles or wheezing   Cardiovascular: Regular rate and rhythm, normal S1 and S2, and no murmur noted   Abdomen: Normal bowel sounds, soft, non-distended, non-tender   Skin: No rashes, no cyanosis, dry to touch   Neuro: Alert and " oriented x3, no weakness, spontaneous and coherent speech   Extremities: No edema, normal range of motion   Other(s): Euthymic mood, not agitated       All other systems: Negative          Medications:      All current medications were reviewed with changes reflected in problem list.         Data:      All new lab and imaging data was reviewed.   Labs:  No results for input(s): CULT in the last 168 hours.  Recent Labs   Lab 01/16/21 0452 01/15/21  0545 01/14/21  0002   WBC 4.9 6.2 3.6*   HGB 8.8* 8.8* 9.9*   HCT 26.3* 26.5* 28.8*   * 110* 107*    239 199     Recent Labs   Lab 01/16/21  0452 01/15/21  0545 01/14/21  0650 01/13/21 2037 01/13/21 2037 01/12/21  0801 01/11/21  1543 01/11/21  0619    144  --   --  142  --   --   --    POTASSIUM 3.4 3.5 3.5   < > 3.1*  --   --  3.6   CHLORIDE 115* 115*  --   --  111*  --   --   --    CO2 26 25  --   --  26  --   --   --    ANIONGAP 2* 4  --   --  5  --   --   --    * 68*  --   --  94  --   --   --    BUN 10 11  --   --  6*  --   --   --    CR 0.93 1.10*  --   --  0.99  --   --   --    GFRESTIMATED 60* 49*  --   --  56*  --   --   --    GFRESTBLACK 69 57*  --   --  64  --   --   --    EARL 7.5* 7.1*  --   --  8.3*  --   --   --    MAG  --   --   --   --   --  2.0 1.5* 1.4*   PROTTOTAL  --   --   --   --  5.7*  --   --   --    ALBUMIN  --   --   --   --  2.5*  --   --   --    BILITOTAL  --   --   --   --  0.5  --   --   --    ALKPHOS  --   --   --   --  57  --   --   --    AST  --   --   --   --  31  --   --   --    ALT  --   --   --   --  12  --   --   --     < > = values in this interval not displayed.     No results for input(s): DD in the last 168 hours.  No results for input(s): SED, CRP in the last 168 hours.  Recent Labs   Lab 01/16/21  0850 01/16/21  0452 01/16/21  0310 01/16/21  0252 01/16/21  0234 01/16/21  0222 01/15/21  0545 01/15/21  0545 01/13/21 2037 01/10/21  0613   GLC  --  124*  --   --   --   --   --  68* 94 55*   BGM 77  --   115* 93 89 98   < >  --   --   --     < > = values in this interval not displayed.     Recent Labs   Lab 01/16/21  0452   INR 1.14     Recent Labs   Lab 01/14/21  0650 01/13/21 2037 01/11/21  0619   LIPASE 460* 747* 646*     Recent Labs   Lab 01/14/21  1214 01/14/21  0650 01/13/21 2037   TROPI 1.202* 1.733* 2.852*     No results for input(s): TSH in the last 168 hours.  Recent Labs   Lab 01/13/21 2024   COLOR Light Yellow   APPEARANCE Clear   URINEGLC Negative   URINEBILI Negative   URINEKETONE 10*   SG 1.016   UBLD Negative   URINEPH 6.0   PROTEIN 20*   NITRITE Negative   LEUKEST Moderate*   RBCU 2   WBCU 6*      Imaging:   Results for orders placed or performed during the hospital encounter of 01/13/21   US Abdomen Limited    Narrative    EXAM: US ABDOMEN LIMITED  LOCATION: [facility]  DATE/TIME: 1/13/2021 10:00 PM    INDICATION: Right-sided abdominal pain. History of pancreatitis.  COMPARISON: CT abdomen and pelvis 01/08/2021  TECHNIQUE: Limited abdominal ultrasound.    FINDINGS:    GALLBLADDER: Small amount of sludge and tiny stones in the gallbladder. Wall thickness upper range of normal measuring 3 mm. Ascites in the right upper quadrant adjacent to the gallbladder.    BILE DUCTS: No biliary dilatation. The common duct measures 5 mm.    LIVER: Normal parenchyma with smooth contour. No focal mass.    RIGHT KIDNEY: Moderate right hydronephrosis    PANCREAS: Dilated pancreatic duct measuring 5 mm.    No ascites.      Impression    IMPRESSION:  1.  Small amount of sludge and stone material in the gallbladder. Wall thickness upper range of normal. Findings equivocal for acute cholecystitis.  2.  Ascites right upper and right lower quadrants.  3.  Slight dilatation of the pancreatic duct as seen on CT.  4.  Right hydronephrosis. This also was present on CT with ureteral stent in place.        full/upper

## 2021-01-16 NOTE — PROGRESS NOTES
[facility]    Cardiology Progress Note     Assessment & Plan       Lisa Galloway is a 75 year old female with no prior cardiac hx, but hx of HL (not on statin therapy), metastatic breast CA, recently dx pancreatitis, likely from gallstones, GERD and asthma who was admitted on 1/13/2021 with recurrent abdominal pain. Cardiology consulted due to elevated troponin. She is overall doing well without symptoms. No SOB, chest pain.      1. Elevated trop, unclear etiology. Suspect NSTEMI type I or type II. TTE shows LVEF 35-40% with distal anterolateral wall and inferior wall are hypokinetic and the apex is dyskinetic. Suspect a stress CM, but she does need an ischemic evaluation as stress CMY is a diagnosis of exclusion after negative ischemic work up. EKG is normal, TN 2.8 downtrended.  -Per my colleague who talked to hospitalist on Friday: she needs eventual cardiac work up but in setting of anemia work up she should not be committed to DAPT and anticoagulation during procedure unless safe to do so.   -eventual lap felecia has also been mentioned but preference according to hospitalist is to perform cardiac work up first. Could potentially obtain lexiscan on Monday as well after limited echocardiogram and defer angiogram.   -if undergoes cath and potential PCI, BMS would be choice stenting to allow for shorter duration DAPT.   -Stopped hep gtt as trop is decreasing, no anginal symptoms and hgb down a bit.  -For now continue with medical therapy, agree with ASA, atorvastatin and metoprolol  -limited echocardiogram Monday, with assessment of LVOT gradient in case her dynamic gradient may be causing troponin elevation     Will continue to follow.     Lindsey Vargas MD  Text Page  (Monday - Friday, 8 am- 5 pm)    Interval History     No chest pain. Denies SOB. No LE edema. No acute events noted overnight. Mildly hypertensive.     Physical Exam   Temp: 98.6  F (37  C) Temp src: Oral BP: (!) 146/74  Pulse: 77   Resp: 18 SpO2: 96 % O2 Device: None (Room air)    Vitals:    01/14/21 0641 01/15/21 0534 01/16/21 0443   Weight: 68.5 kg (151 lb 1.6 oz) 70.6 kg (155 lb 9.6 oz) 71.7 kg (158 lb 1.6 oz)     Vital Signs with Ranges  Temp:  [98.3  F (36.8  C)-98.9  F (37.2  C)] 98.6  F (37  C)  Pulse:  [65-77] 77  Resp:  [18] 18  BP: (114-146)/(50-77) 146/74  SpO2:  [93 %-97 %] 96 %  I/O last 3 completed shifts:  In: 360 [P.O.:360]  Out: -   Patient Active Problem List   Diagnosis     Acute pancreatitis, unspecified complication status, unspecified pancreatitis type     RLQ abdominal pain     ACS (acute coronary syndrome) (H)       Constitutional: Awake, alert, cooperative, no apparent distress  Respiratory: Clear to auscultation bilaterally, no crackles or wheezing  Cardiovascular: Regular rate and rhythm, normal S1 and S2, and no murmur noted  GI: Normal bowel sounds, soft, non-distended, non-tender  Skin/Integumen: No rashes, no cyanosis, no edema  Other:      Medications     - MEDICATION INSTRUCTIONS -         aspirin  81 mg Oral Daily     atorvastatin  40 mg Oral QPM     donepezil  10 mg Oral At Bedtime     enoxaparin ANTICOAGULANT  40 mg Subcutaneous Q24H     gabapentin  900 mg Oral At Bedtime     metoprolol tartrate  25 mg Oral BID     sodium chloride (PF)  10 mL Intracatheter Q8H     sodium chloride (PF)  3 mL Intracatheter Q8H       Data   Results for orders placed or performed during the hospital encounter of 01/13/21 (from the past 24 hour(s))   Reticulocyte count   Result Value Ref Range    % Retic 1.3 0.5 - 2.0 %    Absolute Retic 34.8 25 - 95 10e9/L   Ferritin   Result Value Ref Range    Ferritin 463 (H) 8 - 252 ng/mL   Iron and iron binding capacity   Result Value Ref Range    Iron 25 (L) 35 - 180 ug/dL    Iron Binding Cap 201 (L) 240 - 430 ug/dL    Iron Saturation Index 12 (L) 15 - 46 %   Vitamin B12   Result Value Ref Range    Vitamin B12 1,527 (H) 193 - 986 pg/mL   Folate   Result Value Ref Range    Folate  8.5 >5.4 ng/mL   Glucose by meter   Result Value Ref Range    Glucose 148 (H) 70 - 99 mg/dL   Glucose by meter   Result Value Ref Range    Glucose 98 70 - 99 mg/dL   Glucose by meter   Result Value Ref Range    Glucose 89 70 - 99 mg/dL   Glucose by meter   Result Value Ref Range    Glucose 93 70 - 99 mg/dL   Glucose by meter   Result Value Ref Range    Glucose 115 (H) 70 - 99 mg/dL   CBC with platelets differential   Result Value Ref Range    WBC 4.9 4.0 - 11.0 10e9/L    RBC Count 2.40 (L) 3.8 - 5.2 10e12/L    Hemoglobin 8.8 (L) 11.7 - 15.7 g/dL    Hematocrit 26.3 (L) 35.0 - 47.0 %     (H) 78 - 100 fl    MCH 36.7 (H) 26.5 - 33.0 pg    MCHC 33.5 31.5 - 36.5 g/dL    RDW 15.9 (H) 10.0 - 15.0 %    Platelet Count 261 150 - 450 10e9/L    Diff Method Automated Method     % Neutrophils 66.0 %    % Lymphocytes 20.2 %    % Monocytes 10.6 %    % Eosinophils 1.6 %    % Basophils 1.2 %    % Immature Granulocytes 0.4 %    Nucleated RBCs 0 0 /100    Absolute Neutrophil 3.2 1.6 - 8.3 10e9/L    Absolute Lymphocytes 1.0 0.8 - 5.3 10e9/L    Absolute Monocytes 0.5 0.0 - 1.3 10e9/L    Absolute Eosinophils 0.1 0.0 - 0.7 10e9/L    Absolute Basophils 0.1 0.0 - 0.2 10e9/L    Abs Immature Granulocytes 0.0 0 - 0.4 10e9/L    Absolute Nucleated RBC 0.0    Basic metabolic panel   Result Value Ref Range    Sodium 143 133 - 144 mmol/L    Potassium 3.4 3.4 - 5.3 mmol/L    Chloride 115 (H) 94 - 109 mmol/L    Carbon Dioxide 26 20 - 32 mmol/L    Anion Gap 2 (L) 3 - 14 mmol/L    Glucose 124 (H) 70 - 99 mg/dL    Urea Nitrogen 10 7 - 30 mg/dL    Creatinine 0.93 0.52 - 1.04 mg/dL    GFR Estimate 60 (L) >60 mL/min/[1.73_m2]    GFR Estimate If Black 69 >60 mL/min/[1.73_m2]    Calcium 7.5 (L) 8.5 - 10.1 mg/dL   INR   Result Value Ref Range    INR 1.14 0.86 - 1.14   Glucose by meter   Result Value Ref Range    Glucose 77 70 - 99 mg/dL   Glucose by meter   Result Value Ref Range    Glucose 107 (H) 70 - 99 mg/dL   Glucose by meter   Result Value Ref  Range    Glucose 97 70 - 99 mg/dL

## 2021-01-17 LAB
GLUCOSE BLDC GLUCOMTR-MCNC: 80 MG/DL (ref 70–99)
GLUCOSE BLDC GLUCOMTR-MCNC: 83 MG/DL (ref 70–99)
GLUCOSE BLDC GLUCOMTR-MCNC: 85 MG/DL (ref 70–99)
GLUCOSE BLDC GLUCOMTR-MCNC: 88 MG/DL (ref 70–99)
POTASSIUM SERPL-SCNC: 3.7 MMOL/L (ref 3.4–5.3)
POTASSIUM SERPL-SCNC: 3.9 MMOL/L (ref 3.4–5.3)

## 2021-01-17 PROCEDURE — 999N000157 HC STATISTIC RCP TIME EA 10 MIN

## 2021-01-17 PROCEDURE — 999N001017 HC STATISTIC GLUCOSE BY METER IP

## 2021-01-17 PROCEDURE — 250N000013 HC RX MED GY IP 250 OP 250 PS 637: Performed by: INTERNAL MEDICINE

## 2021-01-17 PROCEDURE — 93010 ELECTROCARDIOGRAM REPORT: CPT | Performed by: INTERNAL MEDICINE

## 2021-01-17 PROCEDURE — 120N000001 HC R&B MED SURG/OB

## 2021-01-17 PROCEDURE — 84132 ASSAY OF SERUM POTASSIUM: CPT | Performed by: INTERNAL MEDICINE

## 2021-01-17 PROCEDURE — 250N000011 HC RX IP 250 OP 636: Performed by: INTERNAL MEDICINE

## 2021-01-17 PROCEDURE — 93005 ELECTROCARDIOGRAM TRACING: CPT

## 2021-01-17 PROCEDURE — 99232 SBSQ HOSP IP/OBS MODERATE 35: CPT | Performed by: INTERNAL MEDICINE

## 2021-01-17 RX ADMIN — ENOXAPARIN SODIUM 40 MG: 40 INJECTION SUBCUTANEOUS at 09:10

## 2021-01-17 RX ADMIN — ACETAMINOPHEN 650 MG: 325 TABLET, FILM COATED ORAL at 00:43

## 2021-01-17 RX ADMIN — DEXTROMETHORPHAN POLISTIREX 30 MG: 30 SUSPENSION ORAL at 09:10

## 2021-01-17 RX ADMIN — METOPROLOL TARTRATE 25 MG: 25 TABLET, FILM COATED ORAL at 09:10

## 2021-01-17 RX ADMIN — Medication 1 LOZENGE: at 23:56

## 2021-01-17 RX ADMIN — METOPROLOL TARTRATE 25 MG: 25 TABLET, FILM COATED ORAL at 20:45

## 2021-01-17 RX ADMIN — DEXTROMETHORPHAN POLISTIREX 30 MG: 30 SUSPENSION ORAL at 20:45

## 2021-01-17 RX ADMIN — GABAPENTIN 900 MG: 300 CAPSULE ORAL at 20:44

## 2021-01-17 RX ADMIN — ASPIRIN 81 MG: 81 TABLET, COATED ORAL at 09:10

## 2021-01-17 RX ADMIN — ATORVASTATIN CALCIUM 40 MG: 40 TABLET, FILM COATED ORAL at 20:45

## 2021-01-17 RX ADMIN — DONEPEZIL HYDROCHLORIDE 10 MG: 10 TABLET ORAL at 20:45

## 2021-01-17 RX ADMIN — ACETAMINOPHEN 650 MG: 325 TABLET, FILM COATED ORAL at 04:38

## 2021-01-17 RX ADMIN — ONDANSETRON 4 MG: 4 TABLET, ORALLY DISINTEGRATING ORAL at 20:50

## 2021-01-17 RX ADMIN — DEXTROMETHORPHAN POLISTIREX 30 MG: 30 SUSPENSION ORAL at 00:31

## 2021-01-17 ASSESSMENT — ACTIVITIES OF DAILY LIVING (ADL)
ADLS_ACUITY_SCORE: 18

## 2021-01-17 ASSESSMENT — MIFFLIN-ST. JEOR: SCORE: 1159.04

## 2021-01-17 NOTE — PLAN OF CARE
Alert and oriented. Ambulated in [place] with SBA and gait belt to main desk, tolerated well. Explained the plan for tomorrow from cardiology note for limited ECHO and stress test vs angiogram. Poor appetite, will enc fluids offer shake. Midline patent.

## 2021-01-17 NOTE — PLAN OF CARE
Stable through shift. Plan awaits per cardiology and surgery for coming week. Ambulated in room and up in chair. Tele SR with inverted T wave.

## 2021-01-17 NOTE — PROVIDER NOTIFICATION
Md notified - Pt c/o dry cough and is requesting cough syrup. Please add prn order if appropriate, thanks!    Addendum: Scheduled cough medication ordered and given w/ relief.

## 2021-01-17 NOTE — PROGRESS NOTES
[facility]    Cardiology Progress Note     Assessment & Plan       Lisa Galloway is a 75 year old female with no prior cardiac hx, but hx of HL (not on statin therapy), metastatic breast CA, recently dx pancreatitis, likely from gallstones, GERD and asthma who was admitted on 1/13/2021 with recurrent abdominal pain. Cardiology consulted due to elevated troponin. She is overall doing well without symptoms. No SOB, chest pain.      1. Elevated trop, unclear etiology. Suspect NSTEMI type I or type II. TTE shows LVEF 35-40% with distal anterolateral wall and inferior wall are hypokinetic and the apex is dyskinetic. Suspect a stress CM, but she does need an eventual ischemic evaluation as stress CMY is a diagnosis of exclusion after negative ischemic work up. EKG is normal, TN 2.8 downtrended.  -Per my colleague who talked to hospitalist on Friday: she needs eventual cardiac work up but in setting of anemia work up she should not be committed to DAPT and anticoagulation during procedure unless safe to do so  -Stopped hep gtt as trop is decreasing, no anginal symptoms and anemia  -For now continue with medical therapy, agree with ASA, atorvastatin and metoprolol  -limited echocardiogram Monday, with assessment of LVOT gradient in case her dynamic gradient may be causing troponin elevation, and to assess any improvement in WMA which would suggest stress CMY. Will decide Monday on ischemic evaluation depending on ongoing medical work up as well. General surgery will plan on possible lap felecia as long as patient is optimized from a cardiac standpoint. Reassuring is that she has no symptoms. Can have a multidisciplinary discussion this week.     2. Prolonged QTc by tele report: please repeat EKG     Will continue to follow.     Lindsey Vargas MD  Text Page  (Monday - Friday, 8 am- 5 pm)    Interval History      No events overnight. Patient feels well and denies chest pain, SOB, n/v. No fevers,  chills, ns.    Physical Exam   Temp: 98.6  F (37  C) Temp src: Oral BP: (!) 155/100 Pulse: 63   Resp: 20 SpO2: 95 % O2 Device: None (Room air)    Vitals:    01/15/21 0534 01/16/21 0443 01/17/21 0436   Weight: 70.6 kg (155 lb 9.6 oz) 71.7 kg (158 lb 1.6 oz) 71.1 kg (156 lb 11.2 oz)     Vital Signs with Ranges  Temp:  [98.6  F (37  C)-99  F (37.2  C)] 98.6  F (37  C)  Pulse:  [63-83] 63  Resp:  [18-20] 20  BP: (124-183)/() 155/100  SpO2:  [95 %-97 %] 95 %  I/O last 3 completed shifts:  In: 270 [P.O.:270]  Out: -   Patient Active Problem List   Diagnosis     Acute pancreatitis, unspecified complication status, unspecified pancreatitis type     RLQ abdominal pain     ACS (acute coronary syndrome) (H)       Constitutional: Awake, alert, cooperative, no apparent distress  Respiratory: Clear to auscultation bilaterally, no crackles or wheezing  Cardiovascular: Regular rate and rhythm, normal S1 and S2, and no murmur noted  GI: Normal bowel sounds, soft, non-distended, non-tender  Skin/Integumen: No rashes, no cyanosis, no edema  Other:      Medications     - MEDICATION INSTRUCTIONS -         aspirin  81 mg Oral Daily     atorvastatin  40 mg Oral QPM     dextromethorphan  30 mg Oral Q12H NANCY     donepezil  10 mg Oral At Bedtime     enoxaparin ANTICOAGULANT  40 mg Subcutaneous Q24H     gabapentin  900 mg Oral At Bedtime     metoprolol tartrate  25 mg Oral BID     sodium chloride (PF)  10 mL Intracatheter Q8H     sodium chloride (PF)  3 mL Intracatheter Q8H       Data   Results for orders placed or performed during the hospital encounter of 01/13/21 (from the past 24 hour(s))   Glucose by meter   Result Value Ref Range    Glucose 97 70 - 99 mg/dL   Glucose by meter   Result Value Ref Range    Glucose 102 (H) 70 - 99 mg/dL   Glucose by meter   Result Value Ref Range    Glucose 85 70 - 99 mg/dL   Potassium   Result Value Ref Range    Potassium 3.7 3.4 - 5.3 mmol/L   Potassium   Result Value Ref Range    Potassium 3.9 3.4  - 5.3 mmol/L   Glucose by meter   Result Value Ref Range    Glucose 88 70 - 99 mg/dL   Glucose by meter   Result Value Ref Range    Glucose 83 70 - 99 mg/dL

## 2021-01-17 NOTE — PLAN OF CARE
VSS. Pt A&Ox4. C/o mild abdominal pain that is relieved w/ tylenol. No CP reported this shift. Had some SOB while lying flat, encouraged sitting up more. LS clear. Tele SR w/ inverted T waves. Midline patent, good blood return. K replaced this shift. Recheck 3.7. Up SBA. Walked halls x1 this shift. Difficult time sleeping tonight. Melatonin and lavender provided w/o success. Discharge 2 days pending cardiac workup/general surgery recommendations. Will continue to monitor per POC.

## 2021-01-17 NOTE — PHARMACY-CONSULT NOTE
QT Interval Prolongation with Recommendation    Current use  of aricept and zofran may result in increased risk of QT prolongation.        Will notify provider of such findings.             Ketan FRANCIS Rph

## 2021-01-17 NOTE — PROGRESS NOTES
[facility]  Hospitalist Progress Note  Reymundo Rodriguez MD, MD 01/17/2021  (Text Page)  Reason for Stay (Diagnosis): Elevated troponin type II AMI versus ACS  Gallstone pancreatitis         Assessment and Plan:      Summary of Stay: Lisa Galloway is a 75 year old female history of metastatic breast cancer with nauseous metastatic lesions noted on recent PET scan, GERD, mild intermittent asthma, anxiety, recent hospitalization for abdominal pain found with acute pancreatitis felt secondary to gallstone etiology with complications of mild DAWSON and was discharged + 1/12/2021 on good condition and stable hemodynamics.  She has a referral set up with  GI and general surgery given her primary issues of her last hospitalization with suspected gallstone induced pancreatitis.  At that time she demonstrated tolerance to oral intake and symptoms appears to be improving and subsequently discharged home.  He also presents back due to her recurrence of abdominal symptomatology    Problem List:     1. Abdominal pain-resolved  2.  Recent gallstone pancreatitis  3. Elevated troponin I  suspected with underlying ACS  4. ? Cholecystitis  5. GERD  6.  History metastatic breast cancer  7.  History of bronchial asthma not in exacerbation  8.  Hypokalemia  9.  Stable Macrocytic anemia  10.  Newly diagnosed cardiomyopathy, stress-induced versus ischemic related  11.  Chronic kidney disease with baseline creatinine 1.2-1.3-better creatinine now at 0.9     Continue inpatient care.  Remain on telemetry monitoring.  Subsequent troponin I levels decreasing.  Overall symptomatology improving as well.  Highly appreciate prompt input from cardiology service.    -No recurrence of any chest pain, nausea or vomiting or abdominal discomfort    -Echocardiogram revealed LVEF of 35 to 40%, hypokinesia at anterolateral and inferior wall   -Suspected stress cardiomyopathy   -However likely needs to rule out ischemic etiology as well  "  -Cardiology planning for further risk ratification likely a coronary angiogram versus stress testing/ repeat Echo to assess LVOT gradient/ in the next coming days   -IV heparin discontinued  -Remain on metoprolol, aspirin, statins   -Not on ACE inhibitor due to soft BP levels  -General surgery following with us with plans for possible laparoscopic cholecystectomy once optimized from medicine and cardiology perspective  -Anemia work-up panel requested  -Oral intake improving, hypokalemia resolved  -Discontinue IV fluid support    DVT Prophylaxis: IV heparin  Code Status: DNR / DNI  Discharge Dispo: home  Estimated Disch Date / # of Days until Disch: 1-2 days        Interval History (Subjective):      Continuing care today.  Seen and examined.  Chart reviewed.  No significant reported events overnight.   Mariana is endorsing no worsening abdominal pain, no chest pain, no shortness of breath.  Remain afebrile         Physical Exam:      Last Vital Signs:  /77 (BP Location: Right leg)   Pulse 67   Temp 98.6  F (37  C) (Oral)   Resp 20   Ht 1.575 m (5' 2\")   Wt 71.1 kg (156 lb 11.2 oz)   SpO2 95%   BMI 28.66 kg/m      I/O last 3 completed shifts:  In: 270 [P.O.:270]  Out: -   Wt Readings from Last 1 Encounters:   01/17/21 71.1 kg (156 lb 11.2 oz)     Vitals:    01/13/21 2124 01/14/21 0641 01/15/21 0534 01/16/21 0443   Weight: 64 kg (141 lb) 68.5 kg (151 lb 1.6 oz) 70.6 kg (155 lb 9.6 oz) 71.7 kg (158 lb 1.6 oz)    01/17/21 0436   Weight: 71.1 kg (156 lb 11.2 oz)       Constitutional: Awake, alert, cooperative, no apparent distress   Respiratory: Clear to auscultation bilaterally, no crackles or wheezing   Cardiovascular: Regular rate and rhythm, normal S1 and S2, and no murmur noted   Abdomen: Normal bowel sounds, soft, non-distended, non-tender   Skin: No rashes, no cyanosis, dry to touch   Neuro: Alert and oriented x3, no weakness, spontaneous and coherent speech   Extremities: No edema, normal range of " motion   Other(s): Euthymic mood, not agitated       All other systems: Negative          Medications:      All current medications were reviewed with changes reflected in problem list.         Data:      All new lab and imaging data was reviewed.   Labs:  No results for input(s): CULT in the last 168 hours.  Recent Labs   Lab 01/16/21  0452 01/15/21  0545 01/14/21  0002   WBC 4.9 6.2 3.6*   HGB 8.8* 8.8* 9.9*   HCT 26.3* 26.5* 28.8*   * 110* 107*    239 199     Recent Labs   Lab 01/17/21  0645 01/17/21  0047 01/16/21 0452 01/15/21  0545 01/13/21 2037 01/13/21 2037 01/12/21  0801 01/11/21  1543 01/11/21  0619   NA  --   --  143 144  --  142  --   --   --    POTASSIUM 3.9 3.7 3.4 3.5   < > 3.1*  --   --  3.6   CHLORIDE  --   --  115* 115*  --  111*  --   --   --    CO2  --   --  26 25  --  26  --   --   --    ANIONGAP  --   --  2* 4  --  5  --   --   --    GLC  --   --  124* 68*  --  94  --   --   --    BUN  --   --  10 11  --  6*  --   --   --    CR  --   --  0.93 1.10*  --  0.99  --   --   --    GFRESTIMATED  --   --  60* 49*  --  56*  --   --   --    GFRESTBLACK  --   --  69 57*  --  64  --   --   --    EARL  --   --  7.5* 7.1*  --  8.3*  --   --   --    MAG  --   --   --   --   --   --  2.0 1.5* 1.4*   PROTTOTAL  --   --   --   --   --  5.7*  --   --   --    ALBUMIN  --   --   --   --   --  2.5*  --   --   --    BILITOTAL  --   --   --   --   --  0.5  --   --   --    ALKPHOS  --   --   --   --   --  57  --   --   --    AST  --   --   --   --   --  31  --   --   --    ALT  --   --   --   --   --  12  --   --   --     < > = values in this interval not displayed.     No results for input(s): DD in the last 168 hours.  No results for input(s): SED, CRP in the last 168 hours.  Recent Labs   Lab 01/17/21  0753 01/17/21  0045 01/16/21  2107 01/16/21  1710 01/16/21  1231 01/16/21  0452 01/16/21  0452 01/15/21  0545 01/15/21  0545 01/13/21 2037   GLC  --   --   --   --   --   --  124*  --  68* 94   BGM 88  85 102* 97 107*   < >  --    < >  --   --     < > = values in this interval not displayed.     Recent Labs   Lab 01/16/21  0452   INR 1.14     Recent Labs   Lab 01/14/21  0650 01/13/21 2037 01/11/21  0619   LIPASE 460* 747* 646*     Recent Labs   Lab 01/14/21  1214 01/14/21  0650 01/13/21 2037   TROPI 1.202* 1.733* 2.852*     No results for input(s): TSH in the last 168 hours.  Recent Labs   Lab 01/13/21 2024   COLOR Light Yellow   APPEARANCE Clear   URINEGLC Negative   URINEBILI Negative   URINEKETONE 10*   SG 1.016   UBLD Negative   URINEPH 6.0   PROTEIN 20*   NITRITE Negative   LEUKEST Moderate*   RBCU 2   WBCU 6*      Imaging:   Results for orders placed or performed during the hospital encounter of 01/13/21   US Abdomen Limited    Narrative    EXAM: US ABDOMEN LIMITED  LOCATION: [facility]  DATE/TIME: 1/13/2021 10:00 PM    INDICATION: Right-sided abdominal pain. History of pancreatitis.  COMPARISON: CT abdomen and pelvis 01/08/2021  TECHNIQUE: Limited abdominal ultrasound.    FINDINGS:    GALLBLADDER: Small amount of sludge and tiny stones in the gallbladder. Wall thickness upper range of normal measuring 3 mm. Ascites in the right upper quadrant adjacent to the gallbladder.    BILE DUCTS: No biliary dilatation. The common duct measures 5 mm.    LIVER: Normal parenchyma with smooth contour. No focal mass.    RIGHT KIDNEY: Moderate right hydronephrosis    PANCREAS: Dilated pancreatic duct measuring 5 mm.    No ascites.      Impression    IMPRESSION:  1.  Small amount of sludge and stone material in the gallbladder. Wall thickness upper range of normal. Findings equivocal for acute cholecystitis.  2.  Ascites right upper and right lower quadrants.  3.  Slight dilatation of the pancreatic duct as seen on CT.  4.  Right hydronephrosis. This also was present on CT with ureteral stent in place.

## 2021-01-18 ENCOUNTER — APPOINTMENT (OUTPATIENT)
Dept: CARDIOLOGY | Facility: CLINIC | Age: 76
DRG: 374 | End: 2021-01-18
Attending: INTERNAL MEDICINE
Payer: MEDICARE

## 2021-01-18 LAB
ANISOCYTOSIS BLD QL SMEAR: SLIGHT
BASOPHILS # BLD AUTO: 0.1 10E9/L (ref 0–0.2)
BASOPHILS NFR BLD AUTO: 1.2 %
CAPILLARY BLOOD COLLECTION: NORMAL
DIFFERENTIAL METHOD BLD: ABNORMAL
EOSINOPHIL # BLD AUTO: 0.1 10E9/L (ref 0–0.7)
EOSINOPHIL NFR BLD AUTO: 0.9 %
ERYTHROCYTE [DISTWIDTH] IN BLOOD BY AUTOMATED COUNT: 15.6 % (ref 10–15)
GLUCOSE BLDC GLUCOMTR-MCNC: 100 MG/DL (ref 70–99)
GLUCOSE BLDC GLUCOMTR-MCNC: 107 MG/DL (ref 70–99)
GLUCOSE BLDC GLUCOMTR-MCNC: 75 MG/DL (ref 70–99)
GLUCOSE BLDC GLUCOMTR-MCNC: 78 MG/DL (ref 70–99)
GLUCOSE BLDC GLUCOMTR-MCNC: 88 MG/DL (ref 70–99)
HCT VFR BLD AUTO: 28.7 % (ref 35–47)
HGB BLD-MCNC: 10.1 G/DL (ref 11.7–15.7)
IMM GRANULOCYTES # BLD: 0.1 10E9/L (ref 0–0.4)
IMM GRANULOCYTES NFR BLD: 1.9 %
INTERPRETATION ECG - MUSE: NORMAL
LYMPHOCYTES # BLD AUTO: 1.1 10E9/L (ref 0.8–5.3)
LYMPHOCYTES NFR BLD AUTO: 19.5 %
MACROCYTES BLD QL SMEAR: PRESENT
MCH RBC QN AUTO: 36.9 PG (ref 26.5–33)
MCHC RBC AUTO-ENTMCNC: 35.2 G/DL (ref 31.5–36.5)
MCV RBC AUTO: 105 FL (ref 78–100)
MONOCYTES # BLD AUTO: 0.9 10E9/L (ref 0–1.3)
MONOCYTES NFR BLD AUTO: 15.1 %
NEUTROPHILS # BLD AUTO: 3.5 10E9/L (ref 1.6–8.3)
NEUTROPHILS NFR BLD AUTO: 61.4 %
NRBC # BLD AUTO: 0 10*3/UL
NRBC BLD AUTO-RTO: 0 /100
OVALOCYTES BLD QL SMEAR: SLIGHT
PLATELET # BLD AUTO: 316 10E9/L (ref 150–450)
PLATELET # BLD EST: ABNORMAL 10*3/UL
POTASSIUM SERPL-SCNC: 4.4 MMOL/L (ref 3.4–5.3)
RBC # BLD AUTO: 2.74 10E12/L (ref 3.8–5.2)
WBC # BLD AUTO: 5.7 10E9/L (ref 4–11)

## 2021-01-18 PROCEDURE — 85025 COMPLETE CBC W/AUTO DIFF WBC: CPT | Performed by: INTERNAL MEDICINE

## 2021-01-18 PROCEDURE — 999N001017 HC STATISTIC GLUCOSE BY METER IP

## 2021-01-18 PROCEDURE — 250N000013 HC RX MED GY IP 250 OP 250 PS 637: Performed by: INTERNAL MEDICINE

## 2021-01-18 PROCEDURE — 93325 DOPPLER ECHO COLOR FLOW MAPG: CPT

## 2021-01-18 PROCEDURE — 93325 DOPPLER ECHO COLOR FLOW MAPG: CPT | Mod: 26 | Performed by: INTERNAL MEDICINE

## 2021-01-18 PROCEDURE — 93308 TTE F-UP OR LMTD: CPT | Mod: 26 | Performed by: INTERNAL MEDICINE

## 2021-01-18 PROCEDURE — 77412 RADIATION TX DELIVERY LVL 3: CPT

## 2021-01-18 PROCEDURE — 36416 COLLJ CAPILLARY BLOOD SPEC: CPT | Performed by: INTERNAL MEDICINE

## 2021-01-18 PROCEDURE — 99232 SBSQ HOSP IP/OBS MODERATE 35: CPT | Performed by: INTERNAL MEDICINE

## 2021-01-18 PROCEDURE — 120N000001 HC R&B MED SURG/OB

## 2021-01-18 PROCEDURE — 99232 SBSQ HOSP IP/OBS MODERATE 35: CPT | Mod: 25 | Performed by: INTERNAL MEDICINE

## 2021-01-18 PROCEDURE — 250N000011 HC RX IP 250 OP 636: Performed by: INTERNAL MEDICINE

## 2021-01-18 PROCEDURE — 84132 ASSAY OF SERUM POTASSIUM: CPT | Performed by: INTERNAL MEDICINE

## 2021-01-18 PROCEDURE — 93321 DOPPLER ECHO F-UP/LMTD STD: CPT | Mod: 26 | Performed by: INTERNAL MEDICINE

## 2021-01-18 RX ORDER — HYDRALAZINE HYDROCHLORIDE 20 MG/ML
10 INJECTION INTRAMUSCULAR; INTRAVENOUS EVERY 4 HOURS PRN
Status: DISCONTINUED | OUTPATIENT
Start: 2021-01-18 | End: 2021-01-18

## 2021-01-18 RX ORDER — HYDRALAZINE HYDROCHLORIDE 20 MG/ML
10 INJECTION INTRAMUSCULAR; INTRAVENOUS ONCE
Status: COMPLETED | OUTPATIENT
Start: 2021-01-18 | End: 2021-01-18

## 2021-01-18 RX ORDER — HYDRALAZINE HYDROCHLORIDE 20 MG/ML
20 INJECTION INTRAMUSCULAR; INTRAVENOUS EVERY 4 HOURS PRN
Status: DISCONTINUED | OUTPATIENT
Start: 2021-01-18 | End: 2021-01-22 | Stop reason: HOSPADM

## 2021-01-18 RX ADMIN — ENOXAPARIN SODIUM 40 MG: 40 INJECTION SUBCUTANEOUS at 08:58

## 2021-01-18 RX ADMIN — DEXTROMETHORPHAN POLISTIREX 30 MG: 30 SUSPENSION ORAL at 19:44

## 2021-01-18 RX ADMIN — DEXTROMETHORPHAN POLISTIREX 30 MG: 30 SUSPENSION ORAL at 09:52

## 2021-01-18 RX ADMIN — ASPIRIN 81 MG: 81 TABLET, COATED ORAL at 08:58

## 2021-01-18 RX ADMIN — GABAPENTIN 900 MG: 300 CAPSULE ORAL at 21:06

## 2021-01-18 RX ADMIN — METOPROLOL TARTRATE 25 MG: 25 TABLET, FILM COATED ORAL at 08:58

## 2021-01-18 RX ADMIN — ONDANSETRON 4 MG: 2 INJECTION INTRAMUSCULAR; INTRAVENOUS at 18:55

## 2021-01-18 RX ADMIN — HYDRALAZINE HYDROCHLORIDE 10 MG: 20 INJECTION INTRAMUSCULAR; INTRAVENOUS at 00:44

## 2021-01-18 RX ADMIN — HYDROMORPHONE HYDROCHLORIDE 0.2 MG: 1 INJECTION, SOLUTION INTRAMUSCULAR; INTRAVENOUS; SUBCUTANEOUS at 18:48

## 2021-01-18 RX ADMIN — HYDRALAZINE HYDROCHLORIDE 10 MG: 20 INJECTION, SOLUTION INTRAMUSCULAR; INTRAVENOUS at 01:57

## 2021-01-18 RX ADMIN — METOPROLOL TARTRATE 25 MG: 25 TABLET, FILM COATED ORAL at 21:06

## 2021-01-18 RX ADMIN — ACETAMINOPHEN 650 MG: 325 TABLET, FILM COATED ORAL at 10:05

## 2021-01-18 ASSESSMENT — MIFFLIN-ST. JEOR: SCORE: 1145.43

## 2021-01-18 ASSESSMENT — ACTIVITIES OF DAILY LIVING (ADL)
ADLS_ACUITY_SCORE: 18
ADLS_ACUITY_SCORE: 17

## 2021-01-18 NOTE — PLAN OF CARE
A/Ox4, Up SBA.B/P's elevated 190's/100's, P-70-80's. IV Hydralazine given x2, B/P recheck 117/69 P 82. C/O headache 2/10 with elevated B/P's. Headache relieved when B/P decreased. BG 75, 120 mls of juice given, recheck 100. Tele per tele tech:SR, inverted Ts, prolonged QTc.

## 2021-01-18 NOTE — PROVIDER NOTIFICATION
B/P continues to be high after Hydralazine 10 mg IV. B/P 192/99 P 85    MD returned call: New order for  Hydralazine 10 mg IV now and  Hydralazine 20 mg IV every 4 hrs PRN.

## 2021-01-18 NOTE — PLAN OF CARE
Admitted with abdo pain, gall stone pancreatitis. Alert, oriented, forgetful. up stand by assist. Tylenol for pain. Dry cough, lungs have fine crackles. Elevated troponin (2.8), cards following. Limited echo completed this morning. New cardiomyopathy EF 35-40%.  Hx breast Ca. Radiation scheduled for this afternoon. Poor po. Erin tomorrow. Continue POC.

## 2021-01-18 NOTE — PROGRESS NOTES
[facility]    Cardiology Progress Note    Primary Cardiologist: Will be Dr. Rudd    Date of Admission: 01/13/2021  Service Date: 01/18/2021    Summary:  Ms. Lisa Galloway is a very pleasant 75 year old female with no prior cardiac history, but history of hyperlipidemia (not on statin therapy), metastatic breast cancer, recently diagnosed pancreatitis, likely from gallstones, GERD, and asthma. She was admitted on 1/13/2021 after presenting with recurrent abdominal pain. Cardiology was consulted due to an elevated troponin.    Assessment & Plan   1. Elevated troponin   - Troponin elevated to a peak of 2.8 and trended down. EKG without acute ischemic changes.   - Etiology unclear. Possible type I or type II NSTEMI.   - TTE showing LVEF 35-40% with distal anterolateral wall and inferior wall hypokinesis and dyskinesis of the apex consistent with a possible stress cardiomyopathy. Cannot entirely exclude the possibility of ischemia and will an eventually need ischemic evaluation. In the setting of anemia work up, she should not be committed to DAPT currently.  - Repeat limited echocardiogram today appears similar with LVEF 35-40% with WMAs unchanged.  - General surgery planning on possible lap felecia as long as patient is optimized from a cardiac standpoint. Reassuring is that she has no symptoms.      2. Newly diagnosed cardiomyopathy  - TTE showing LVEF 35-40% with distal anterolateral wall and inferior wall hypokinesis and dyskinesis of the apex consistent with a possible stress cardiomyopathy. Cannot entirely exclude the possibility of ischemia and will an eventually need ischemic evaluation as noted above.   - Repeat limited echocardiogram today appears similar with LVEF 35-40% with WMAs unchanged.    3. Prolonged QTc  - QTc mildly prolonged at 509 ms by EKG yesterday. Etiology unclear--possibly secondary to donezepil (Aricept)? Would consider stopping this and avoid any other QT  prolonging agents.    4. Metastatic breast cancer    5. Recent gallstone pancreatitis     6. Stable macrocytic anemia    7. Chronic kidney disease   - baseline creatinine of around 1.2 to 1.3.     Plan:   1. Continue with medical therapy with aspirin, atorvastatin, and metoprolol.  2. Will plan for Lexiscan nuclear stress test for ischemic evaluation today.   3. Would consider stopping aricept due to prolonged QTc as noted above.  4. Cardiology will continue to follow along.    Interval History   Patient is resting comfortably. She denies symptoms of chest pain or shortness of breath. She feels her abdominal pain has improved the options of ischemic evaluation with a stress test versus invasive coronary angiography were reviewed with the patient. She prefers to start with a stress test which I think is reasonable. If any significant ischemia is noted, then would proceed with coronary angiogram and possible PCI. If no significant ischemia is noted, then she should be okay from a cardiac standpoint to proceed with the lap felecia.     Telemetry: NSR    Thank you for the opportunity to participate in this pleasant patient's care.     JOHAN Rouse, CNP   Nurse Practitioner  [facility]  Pager: [phone]  Text Page  (8am - 5pm, M-F)    Patient Active Problem List   Diagnosis     Acute pancreatitis, unspecified complication status, unspecified pancreatitis type     RLQ abdominal pain     ACS (acute coronary syndrome) (H)       Physical Exam   Temp: 98.5  F (36.9  C) Temp src: Oral BP: (!) 158/80 Pulse: 85   Resp: 16 SpO2: 95 % O2 Device: None (Room air)    Vitals:    01/16/21 0443 01/17/21 0436 01/18/21 0344   Weight: 71.7 kg (158 lb 1.6 oz) 71.1 kg (156 lb 11.2 oz) 69.7 kg (153 lb 11.2 oz)     Vital Signs with Ranges  Temp:  [97.6  F (36.4  C)-98.5  F (36.9  C)] 98.5  F (36.9  C)  Pulse:  [72-89] 85  Resp:  [16-18] 16  BP: (117-198)/() 158/80  SpO2:  [95 %-99 %] 95 %  I/O last 3 completed  shifts:  In: 360 [P.O.:360]  Out: -     Constitutional:  Appears her stated age, well nourished, and in no acute distress.  Eyes: Pupils equal, round. Sclerae anicteric.   HEENT: Normocephalic, atraumatic.   Neck: Supple. No JVD appreciated.  Respiratory: Breathing non-labored. Lungs with mild expiratory wheezes throughout, otherwise clear bilaterally with no crackles.  Cardiovascular: Regular rate and rhythm, normal S1 and S2. No murmur, rub, or gallop. Radial pulses are full and equal bilaterally.  GI: Soft, non-distended, non-tender.  Skin: Warm, dry. No apparent rashes, cyanosis, edema, or xanthelasma.  Musculoskeletal/Extremities: Moves all extremities well and symmetrically. No lower extremity edema bilaterally.  Neurologic: No gross focal deficits. Alert, cooperative.  Psychiatric: Affect appropriate. Mentation normal.    Medications     - MEDICATION INSTRUCTIONS -         aspirin  81 mg Oral Daily     atorvastatin  40 mg Oral QPM     dextromethorphan  30 mg Oral Q12H NANCY     donepezil  10 mg Oral At Bedtime     enoxaparin ANTICOAGULANT  40 mg Subcutaneous Q24H     gabapentin  900 mg Oral At Bedtime     metoprolol tartrate  25 mg Oral BID     sodium chloride (PF)  10 mL Intracatheter Q8H     sodium chloride (PF)  3 mL Intracatheter Q8H     Data   Results for orders placed or performed during the hospital encounter of 01/13/21 (from the past 24 hour(s))   Glucose by meter   Result Value Ref Range    Glucose 83 70 - 99 mg/dL   EKG 12-lead, tracing only   Result Value Ref Range    Interpretation ECG Click View Image link to view waveform and result    Glucose by meter   Result Value Ref Range    Glucose 80 70 - 99 mg/dL   Glucose by meter   Result Value Ref Range    Glucose 75 70 - 99 mg/dL   Glucose by meter   Result Value Ref Range    Glucose 100 (H) 70 - 99 mg/dL   CBC with platelets differential   Result Value Ref Range    WBC 5.7 4.0 - 11.0 10e9/L    RBC Count 2.74 (L) 3.8 - 5.2 10e12/L    Hemoglobin 10.1  (L) 11.7 - 15.7 g/dL    Hematocrit 28.7 (L) 35.0 - 47.0 %     (H) 78 - 100 fl    MCH 36.9 (H) 26.5 - 33.0 pg    MCHC 35.2 31.5 - 36.5 g/dL    RDW 15.6 (H) 10.0 - 15.0 %    Platelet Count 316 150 - 450 10e9/L    Diff Method Automated Method     % Neutrophils 61.4 %    % Lymphocytes 19.5 %    % Monocytes 15.1 %    % Eosinophils 0.9 %    % Basophils 1.2 %    % Immature Granulocytes 1.9 %    Nucleated RBCs 0 0 /100    Absolute Neutrophil 3.5 1.6 - 8.3 10e9/L    Absolute Lymphocytes 1.1 0.8 - 5.3 10e9/L    Absolute Monocytes 0.9 0.0 - 1.3 10e9/L    Absolute Eosinophils 0.1 0.0 - 0.7 10e9/L    Absolute Basophils 0.1 0.0 - 0.2 10e9/L    Abs Immature Granulocytes 0.1 0 - 0.4 10e9/L    Absolute Nucleated RBC 0.0     Anisocytosis Slight     Ovalocytes Slight     Macrocytes Present     Platelet Estimate       Automated count confirmed.  Platelet morphology is normal.   Potassium   Result Value Ref Range    Potassium 4.4 3.4 - 5.3 mmol/L   Capillary Blood Collection   Result Value Ref Range    Capillary Blood Collection Capillary collection performed    Echocardiogram Limited    Narrative    [id number]  [id number]  [id number]  [id number]^ABDI           [facility]  Echocardiography Laboratory  [address] [place] [address]        Name: NATALIE LIRA  MRN: [id number]  : 1945  Study Date: 2021 09:01 AM  Age: 75 yrs  Gender: Female  Patient Location: [facility]  Reason For Study: Stress Cardiomyopathy  Ordering Physician: ABDI BRONSON  Performed By: Saadia Tubbs     BSA: 1.7 m2  Height: 62 in  Weight: 156 lb  HR: 82  _____________________________________________________________________________  __        Procedure  Limited Portable Echo Adult.  _____________________________________________________________________________  __        Interpretation Summary     Left ventricular systolic function is moderately reduced.  The visual ejection fraction is estimated at  35-40%.  There are regional wall motion abnormalities as specified.  The study was technically difficult. Compared to the prior study dated 1/4/21,  there have been no changes.  _____________________________________________________________________________  __        Left Ventricle  Left ventricular systolic function is moderately reduced. The visual ejection  fraction is estimated at 35-40%. Mid and distal septal, lateral, anterior,  inferior and apical akinesis. There are regional wall motion abnormalities as  specified.     Right Ventricle  The right ventricle is normal size. The right ventricular systolic function is  normal.     Mitral Valve  There is moderate mitral annular calcification.        Tricuspid Valve  There is mild to moderate (1-2+) tricuspid regurgitation. The right  ventricular systolic pressure is approximated at 42mmHg plus the right atrial  pressure. Right ventricular systolic pressure is elevated, consistent with  mild to moderate pulmonary hypertension.     Aortic Valve  The aortic valve is trileaflet. No aortic regurgitation is present. No aortic  stenosis is present.     Pulmonic Valve  The pulmonic valve is not well visualized.     Pericardium  There is no pericardial effusion.     _____________________________________________________________________________  __                             Report approved by: Kalli Cee 01/18/2021 09:48 AM                    _____________________________________________________________________________  __      Glucose by meter   Result Value Ref Range    Glucose 78 70 - 99 mg/dL     This note was completed in part using Dragon voice recognition software. Although reviewed after completion, some word and grammatical errors may occur.

## 2021-01-18 NOTE — PROGRESS NOTES
[facility]  Hospitalist Progress Note  Mikel Ortega MD 01/18/2021    Reason for Stay (Diagnosis): NSTEMI         Assessment and Plan:      Summary of Stay: Lisa Galloway is a 75 year old female with no prior cardiac history, but history of hyperlipidemia (not on statin therapy), metastatic breast cancer, recently diagnosed pancreatitis, likely from gallstones, GERD, and asthma. She was admitted on 1/13/2021 after presenting with recurrent abdominal pain. Cardiology was consulted due to an elevated troponin.    The patient has been medically stable since being hospitalized.  She denies any chest pain.      Cardiology and surgery are following.      Cardiology is planning on Lexiscan stress test to r/o ischemia.      Surgery is waiting for medical clearance after cardiac work up to proceed with lap felecia in the setting of her recent episode of gallstone pancreatitis.        Assessment & Plan     1. Elevated troponin/NSTEMI with newly dx CM  - Troponin elevated to a peak of 2.8  - suspected Type 2 MI/demand ischemia   - TTE 1/13 showing LVEF 35-40% with distal anterolateral wall and inferior wall hypokinesis and dyskinesis of the apex consistent with a possible stress cardiomyopathy.   - Repeat limited echocardiogram 1/17 appears similar with LVEF 35-40% with WMAs unchanged.  - General surgery planning on possible lap felecia as long as patient is optimized from a cardiac standpoint. Cardiology planning stress test to r/o ischemia      2.  Recent gallstone pancreatitis  - surgery following - planning lap felecia after cleared by cardiology    3.  History of metastatic breast cancer    4.  History of bronchial asthma not in exacerbation    5.  Stable Macrocytic anemia  - work up unremarkable including ferritin, TIBC, B12, folate levels.  No e/o iron deficiency    6.  Chronic kidney disease with baseline creatinine 1.2-1.3    Addendum:  I called and updated spouse Margareth on plan of care today. All  "questions answered    Code:  Full  DVT ppx:  lovenox  Dispo:  Unclear; await results of cards work up and surgical procedure           Interval History (Subjective):      Has some RUQ pain.  No cp.  No sob.  No fever.  Seen by cardiology today; lexiscan stress test being ordered                  Physical Exam:      Last Vital Signs:  BP (!) 158/80 (BP Location: Left leg)   Pulse 85   Temp 98.5  F (36.9  C) (Oral)   Resp 16   Ht 1.575 m (5' 2\")   Wt 69.7 kg (153 lb 11.2 oz)   SpO2 95%   BMI 28.11 kg/m      No intake or output data in the 24 hours ending 21 1435    Constitutional: Awake, alert, cooperative, no apparent distress   Respiratory: Clear to auscultation bilaterally, no crackles or wheezing   Cardiovascular: Regular rate and rhythm, normal S1 and S2, and no murmur noted   Abdomen: Normal bowel sounds, soft, non-distended, non-tender   Skin: No rashes, no cyanosis, dry to touch   Neuro: Alert and oriented x3, no weakness, numbness, memory loss   Extremities: No edema, normal range of motion   Other(s):        All other systems: Negative          Medications:      All current medications were reviewed with changes reflected in problem list.         Data:      All new lab and imaging data was reviewed.   Labs:  Recent Labs   Lab 21  0740   WBC 5.7   HGB 10.1*   HCT 28.7*   *         Imaging:   Recent Results (from the past 24 hour(s))   Echocardiogram Limited    Narrative    [id number]  [id number]  [id number]  [id number]^AUDIE^ABDI           [facility]  Echocardiography Laboratory  [address] [place] [address]        Name: NATALIE LIRA  MRN: [id number]  : 1945  Study Date: 2021 09:01 AM  Age: 75 yrs  Gender: Female  Patient Location: [facility]  Reason For Study: Stress Cardiomyopathy  Ordering Physician: ABDI BRONSON  Performed By: Saadia Tubbs     BSA: 1.7 m2  Height: 62 in  Weight: 156 lb  HR: " 82  _____________________________________________________________________________  __        Procedure  Limited Portable Echo Adult.  _____________________________________________________________________________  __        Interpretation Summary     Left ventricular systolic function is moderately reduced.  The visual ejection fraction is estimated at 35-40%.  There are regional wall motion abnormalities as specified.  The study was technically difficult. Compared to the prior study dated 1/4/21,  there have been no changes.  _____________________________________________________________________________  __        Left Ventricle  Left ventricular systolic function is moderately reduced. The visual ejection  fraction is estimated at 35-40%. Mid and distal septal, lateral, anterior,  inferior and apical akinesis. There are regional wall motion abnormalities as  specified.     Right Ventricle  The right ventricle is normal size. The right ventricular systolic function is  normal.     Mitral Valve  There is moderate mitral annular calcification.        Tricuspid Valve  There is mild to moderate (1-2+) tricuspid regurgitation. The right  ventricular systolic pressure is approximated at 42mmHg plus the right atrial  pressure. Right ventricular systolic pressure is elevated, consistent with  mild to moderate pulmonary hypertension.     Aortic Valve  The aortic valve is trileaflet. No aortic regurgitation is present. No aortic  stenosis is present.     Pulmonic Valve  The pulmonic valve is not well visualized.     Pericardium  There is no pericardial effusion.     _____________________________________________________________________________  __                             Report approved by: Kennedy Sandra, MDon 01/18/2021 09:48 AM                    _____________________________________________________________________________  __

## 2021-01-18 NOTE — PLAN OF CARE
Pt A&O, VSS, resting comfortably, no pain or nausea. Has poor appetite, has not eaten much today. Glucose checks stable. Up with SBA with gait belt. Midline in left arm patent.

## 2021-01-19 ENCOUNTER — APPOINTMENT (OUTPATIENT)
Dept: NUCLEAR MEDICINE | Facility: CLINIC | Age: 76
DRG: 374 | End: 2021-01-19
Attending: NURSE PRACTITIONER
Payer: MEDICARE

## 2021-01-19 ENCOUNTER — APPOINTMENT (OUTPATIENT)
Dept: CARDIOLOGY | Facility: CLINIC | Age: 76
DRG: 374 | End: 2021-01-19
Attending: NURSE PRACTITIONER
Payer: MEDICARE

## 2021-01-19 LAB
ALBUMIN SERPL-MCNC: 2.2 G/DL (ref 3.4–5)
ALP SERPL-CCNC: 59 U/L (ref 40–150)
ALT SERPL W P-5'-P-CCNC: 32 U/L (ref 0–50)
ANION GAP SERPL CALCULATED.3IONS-SCNC: 4 MMOL/L (ref 3–14)
AST SERPL W P-5'-P-CCNC: 41 U/L (ref 0–45)
BILIRUB SERPL-MCNC: 0.5 MG/DL (ref 0.2–1.3)
BUN SERPL-MCNC: 12 MG/DL (ref 7–30)
CALCIUM SERPL-MCNC: 7.8 MG/DL (ref 8.5–10.1)
CHLORIDE SERPL-SCNC: 112 MMOL/L (ref 94–109)
CO2 SERPL-SCNC: 25 MMOL/L (ref 20–32)
CREAT SERPL-MCNC: 1.17 MG/DL (ref 0.52–1.04)
CV BLOOD PRESSURE: 59 %
CV STRESS MAX HR HE: 99
GFR SERPL CREATININE-BSD FRML MDRD: 45 ML/MIN/{1.73_M2}
GLUCOSE BLDC GLUCOMTR-MCNC: 100 MG/DL (ref 70–99)
GLUCOSE BLDC GLUCOMTR-MCNC: 78 MG/DL (ref 70–99)
GLUCOSE BLDC GLUCOMTR-MCNC: 83 MG/DL (ref 70–99)
GLUCOSE SERPL-MCNC: 96 MG/DL (ref 70–99)
NUC STRESS EJECTION FRACTION: 62 %
PLATELET # BLD AUTO: 340 10E9/L (ref 150–450)
POTASSIUM SERPL-SCNC: 3.7 MMOL/L (ref 3.4–5.3)
PROT SERPL-MCNC: 5.3 G/DL (ref 6.8–8.8)
RATE PRESSURE PRODUCT: NORMAL
SODIUM SERPL-SCNC: 141 MMOL/L (ref 133–144)
STRESS ECHO BASELINE DIASTOLIC HE: 80
STRESS ECHO BASELINE HR: 72
STRESS ECHO BASELINE SYSTOLIC BP: 166
STRESS ECHO CALCULATED PERCENT HR: 68 %
STRESS ECHO LAST STRESS DIASTOLIC BP: 83
STRESS ECHO LAST STRESS SYSTOLIC BP: 167
STRESS ECHO TARGET HR: 145

## 2021-01-19 PROCEDURE — 250N000013 HC RX MED GY IP 250 OP 250 PS 637: Performed by: INTERNAL MEDICINE

## 2021-01-19 PROCEDURE — 93018 CV STRESS TEST I&R ONLY: CPT | Mod: ME | Performed by: INTERNAL MEDICINE

## 2021-01-19 PROCEDURE — 99232 SBSQ HOSP IP/OBS MODERATE 35: CPT | Performed by: INTERNAL MEDICINE

## 2021-01-19 PROCEDURE — 83880 ASSAY OF NATRIURETIC PEPTIDE: CPT | Performed by: INTERNAL MEDICINE

## 2021-01-19 PROCEDURE — 78452 HT MUSCLE IMAGE SPECT MULT: CPT | Mod: 26 | Performed by: INTERNAL MEDICINE

## 2021-01-19 PROCEDURE — G1004 CDSM NDSC: HCPCS

## 2021-01-19 PROCEDURE — 250N000011 HC RX IP 250 OP 636: Performed by: INTERNAL MEDICINE

## 2021-01-19 PROCEDURE — 77412 RADIATION TX DELIVERY LVL 3: CPT

## 2021-01-19 PROCEDURE — 93016 CV STRESS TEST SUPVJ ONLY: CPT | Performed by: INTERNAL MEDICINE

## 2021-01-19 PROCEDURE — 120N000001 HC R&B MED SURG/OB

## 2021-01-19 PROCEDURE — 999N001017 HC STATISTIC GLUCOSE BY METER IP

## 2021-01-19 PROCEDURE — 80053 COMPREHEN METABOLIC PANEL: CPT | Performed by: INTERNAL MEDICINE

## 2021-01-19 PROCEDURE — 93017 CV STRESS TEST TRACING ONLY: CPT

## 2021-01-19 PROCEDURE — G1004 CDSM NDSC: HCPCS | Performed by: INTERNAL MEDICINE

## 2021-01-19 PROCEDURE — A9502 TC99M TETROFOSMIN: HCPCS | Performed by: INTERNAL MEDICINE

## 2021-01-19 PROCEDURE — 85049 AUTOMATED PLATELET COUNT: CPT | Performed by: INTERNAL MEDICINE

## 2021-01-19 PROCEDURE — 84443 ASSAY THYROID STIM HORMONE: CPT | Performed by: INTERNAL MEDICINE

## 2021-01-19 PROCEDURE — 250N000011 HC RX IP 250 OP 636

## 2021-01-19 PROCEDURE — 343N000001 HC RX 343: Performed by: INTERNAL MEDICINE

## 2021-01-19 PROCEDURE — 99232 SBSQ HOSP IP/OBS MODERATE 35: CPT | Mod: 25 | Performed by: INTERNAL MEDICINE

## 2021-01-19 RX ORDER — MULTIPLE VITAMINS W/ MINERALS TAB 9MG-400MCG
1 TAB ORAL DAILY
Status: DISCONTINUED | OUTPATIENT
Start: 2021-01-20 | End: 2021-01-22 | Stop reason: HOSPADM

## 2021-01-19 RX ORDER — REGADENOSON 0.08 MG/ML
INJECTION, SOLUTION INTRAVENOUS
Status: COMPLETED
Start: 2021-01-19 | End: 2021-01-19

## 2021-01-19 RX ADMIN — DEXTROMETHORPHAN POLISTIREX 30 MG: 30 SUSPENSION ORAL at 22:30

## 2021-01-19 RX ADMIN — ENOXAPARIN SODIUM 40 MG: 40 INJECTION SUBCUTANEOUS at 13:04

## 2021-01-19 RX ADMIN — METOPROLOL TARTRATE 25 MG: 25 TABLET, FILM COATED ORAL at 13:04

## 2021-01-19 RX ADMIN — REGADENOSON 0.4 MG: 0.08 INJECTION, SOLUTION INTRAVENOUS at 09:36

## 2021-01-19 RX ADMIN — GABAPENTIN 900 MG: 300 CAPSULE ORAL at 22:31

## 2021-01-19 RX ADMIN — METOPROLOL TARTRATE 25 MG: 25 TABLET, FILM COATED ORAL at 20:22

## 2021-01-19 RX ADMIN — DEXTROMETHORPHAN POLISTIREX 30 MG: 30 SUSPENSION ORAL at 13:13

## 2021-01-19 RX ADMIN — TETROFOSMIN 10.8 MCI.: 1.38 INJECTION, POWDER, LYOPHILIZED, FOR SOLUTION INTRAVENOUS at 08:10

## 2021-01-19 RX ADMIN — ONDANSETRON 4 MG: 4 TABLET, ORALLY DISINTEGRATING ORAL at 20:18

## 2021-01-19 RX ADMIN — ATORVASTATIN CALCIUM 40 MG: 40 TABLET, FILM COATED ORAL at 20:21

## 2021-01-19 RX ADMIN — ASPIRIN 81 MG: 81 TABLET, COATED ORAL at 13:03

## 2021-01-19 RX ADMIN — TETROFOSMIN 30.5 MCI.: 1.38 INJECTION, POWDER, LYOPHILIZED, FOR SOLUTION INTRAVENOUS at 09:39

## 2021-01-19 RX ADMIN — ACETAMINOPHEN 650 MG: 325 TABLET, FILM COATED ORAL at 10:35

## 2021-01-19 ASSESSMENT — MIFFLIN-ST. JEOR: SCORE: 1141.35

## 2021-01-19 ASSESSMENT — ACTIVITIES OF DAILY LIVING (ADL)
ADLS_ACUITY_SCORE: 18
ADLS_ACUITY_SCORE: 17

## 2021-01-19 NOTE — PROGRESS NOTES
[facility]  Hospitalist Progress Note  Mikel Ortega MD 01/19/2021    Reason for Stay (Diagnosis): NSTEMI, recent gallstone pancreatitis         Assessment and Plan:      Summary of Stay: Lisa Galloway is a 75 year old female with no prior cardiac history, but history of hyperlipidemia (not on statin therapy), metastatic breast cancer, recently diagnosed pancreatitis, likely from gallstones, GERD, and asthma. She was admitted on 1/13/2021 after presenting with recurrent abdominal pain. Cardiology was consulted due to an elevated troponin.     The patient has been medically stable since being hospitalized.  She denies any chest pain.       Cardiology and surgery are following.       Cardiology ordered a Lexiscan stress test today to r/o iscemia.     Surgery is waiting for medical clearance after cardiac work up to proceed with lap felecia in the setting of her recent episode of gallstone pancreatitis.       Addendum:  I called and updated patient's spouse Margareth again today     Assessment & Plan     1. Elevated troponin/NSTEMI with newly dx CM  - Troponin elevated to a peak of 2.8  - suspected Type 2 MI/demand ischemia   - TTE 1/13 showing LVEF 35-40% with distal anterolateral wall and inferior wall hypokinesis and dyskinesis of the apex consistent with a possible stress cardiomyopathy.   - Repeat limited echocardiogram 1/17 appears similar with LVEF 35-40% with WMAs unchanged.  - Await results of stress test today.  General surgery planning on possible lap felecia as long as patient is optimized from a cardiac standpoint.       2.  Recent gallstone pancreatitis  - surgery following - planning lap felecia after cleared by cardiology     3.  History of metastatic breast cancer     4.  History of bronchial asthma not in exacerbation     5.  Stable Macrocytic anemia  - work up unremarkable including ferritin, TIBC, B12, folate levels.  No e/o iron deficiency     6.  Chronic kidney disease with  "baseline creatinine 1.2-1.3        Code:  Full  DVT ppx:  lovenox  Dispo:  Unclear; await results of cards work up and surgical procedure            Interval History (Subjective):      Continues to have some mild RUQ pain.  No CP.  No SOB.  Has a slight headache this AM.  No fever.  Completed Lexiscan stress test this AM; results pending                  Physical Exam:      Last Vital Signs:  BP (!) 154/80   Pulse 75   Temp 97.8  F (36.6  C) (Oral)   Resp 18   Ht 1.575 m (5' 2\")   Wt 69.3 kg (152 lb 12.8 oz)   SpO2 95%   BMI 27.95 kg/m        Intake/Output Summary (Last 24 hours) at 2021 1333  Last data filed at 2021 1700  Gross per 24 hour   Intake 50 ml   Output --   Net 50 ml       Constitutional: Awake, alert, cooperative, no apparent distress   Respiratory: Clear to auscultation bilaterally, no crackles or wheezing   Cardiovascular: Regular rate and rhythm, normal S1 and S2, and no murmur noted   Abdomen: Normal bowel sounds, soft, non-distended, minimal TTP RUQ   Skin: No rashes, no cyanosis, dry to touch   Neuro: Alert and oriented x3, no weakness, numbness, memory loss   Extremities: No edema, normal range of motion   Other(s):        All other systems: Negative          Medications:      All current medications were reviewed with changes reflected in problem list.         Data:      All new lab and imaging data was reviewed.   Labs:  Recent Labs   Lab 21  1230 21  0740   WBC  --  5.7   HGB  --  10.1*   HCT  --  28.7*   MCV  --  105*    316      Imaging:   Recent Results (from the past 24 hour(s))   Echocardiogram Limited    Narrative    [id number]  [id number]  [id number]  [id number]^AUDIE^[facility]  Echocardiography Laboratory  [address] [place] [address]        Name: NATALIE LIRA  MRN: [id number]  : 1945  Study Date: 2021 09:01 AM  Age: 75 yrs  Gender: Female  Patient Location: [facility]  Reason For Study: " Stress Cardiomyopathy  Ordering Physician: ABDI BRONSON  Performed By: Saadia Tubbs     BSA: 1.7 m2  Height: 62 in  Weight: 156 lb  HR: 82  _____________________________________________________________________________  __        Procedure  Limited Portable Echo Adult.  _____________________________________________________________________________  __        Interpretation Summary     Left ventricular systolic function is moderately reduced.  The visual ejection fraction is estimated at 35-40%.  There are regional wall motion abnormalities as specified.  The study was technically difficult. Compared to the prior study dated 1/4/21,  there have been no changes.  _____________________________________________________________________________  __        Left Ventricle  Left ventricular systolic function is moderately reduced. The visual ejection  fraction is estimated at 35-40%. Mid and distal septal, lateral, anterior,  inferior and apical akinesis. There are regional wall motion abnormalities as  specified.     Right Ventricle  The right ventricle is normal size. The right ventricular systolic function is  normal.     Mitral Valve  There is moderate mitral annular calcification.        Tricuspid Valve  There is mild to moderate (1-2+) tricuspid regurgitation. The right  ventricular systolic pressure is approximated at 42mmHg plus the right atrial  pressure. Right ventricular systolic pressure is elevated, consistent with  mild to moderate pulmonary hypertension.     Aortic Valve  The aortic valve is trileaflet. No aortic regurgitation is present. No aortic  stenosis is present.     Pulmonic Valve  The pulmonic valve is not well visualized.     Pericardium  There is no pericardial effusion.     _____________________________________________________________________________  __                             Report approved by: Kalli Cee 01/18/2021 09:48 AM                     _____________________________________________________________________________  __      NM MPI w Lexiscan   Result Value    Target     Baseline Systolic     Baseline Diastolic BP 80    Last Stress Systolic     Last Stress Diastolic BP 83    Baseline HR 72    Max HR 99    Calculated Percent HR 68    Rate Pressure Product 16,533.0    Nuc Rest EF 59    Left Ventricular EF 62    Narrative       The nuclear stress test is abnormal.     There is a medium sized area of a severe degree of infarction in the   apical segment(s) of the left ventricle. This appears to be in the left   anterior descending distribution. There is mild mendel-infarct ischemia.     Left ventricular function is normal.     The left ventricular ejection fraction at rest is 59%.  The left   ventricular ejection fraction at stress is 62%.     There is no prior study for comparison.

## 2021-01-19 NOTE — PROGRESS NOTES
[facility]    Cardiology Progress Note    Primary Cardiologist: Will be Dr. Rudd    Date of Admission: 01/13/2021  Service Date: 01/19/2021    Summary:  Ms. Lisa Galloway is a very pleasant 75 year old female with no prior cardiac history, but history of hyperlipidemia (not on statin therapy), metastatic breast cancer, recently diagnosed pancreatitis, likely from gallstones, GERD, and asthma. She was admitted on 1/13/2021 after presenting with recurrent abdominal pain. Cardiology was consulted due to an elevated troponin.    Assessment & Plan   1. Elevated troponin   - Troponin elevated to a peak of 2.8 and trended down. EKG without acute ischemic changes.   - Etiology unclear. Possible type I or type II NSTEMI.   - TTE showing LVEF 35-40% with distal anterolateral wall and inferior wall hypokinesis and dyskinesis of the apex consistent with a possible stress cardiomyopathy. Cannot entirely exclude the possibility of ischemia and will an eventually need ischemic evaluation. In the setting of anemia work up, she should not be committed to DAPT currently.  - Repeat limited echocardiogram yesterday appears similar with LVEF 35-40% with WMAs unchanged.  - General surgery planning on possible lap felecia as long as patient is optimized from a cardiac standpoint. Reassuring is that she has no symptoms.      2. Newly diagnosed cardiomyopathy  - TTE showing LVEF 35-40% with distal anterolateral wall and inferior wall hypokinesis and dyskinesis of the apex consistent with a possible stress cardiomyopathy. Cannot entirely exclude the possibility of ischemia and will an eventually need ischemic evaluation as noted above.   - Repeat limited echocardiogram yesterday appears similar with LVEF 35-40% with WMAs unchanged.    3. Prolonged QTc  - QTc mildly prolonged at 509 ms by EKG 1/17/21. Etiology unclear--possibly secondary to donezepil (Aricept)? Would consider stopping this and avoid any other QT  prolonging agents.    4. Metastatic breast cancer    5. Recent gallstone pancreatitis     6. Stable macrocytic anemia    7. Chronic kidney disease   - Baseline creatinine of around 1.2 to 1.3.     Plan:   1. Continue with medical therapy with aspirin, atorvastatin, and metoprolol.  2. Lexiscan nuclear stress test completed this morning for ischemic evaluation with results pending.   3. Consider stopping aricept due to prolonged QTc on EKG this admission as noted above.  4. Cardiology will continue to follow along.    Interval History   Patient is resting comfortably. She denies symptoms of chest pain or shortness of breath overnight or this morning.    Telemetry: NSR    Thank you for the opportunity to participate in this pleasant patient's care.     JOHAN Rouse, CNP   Nurse Practitioner  [facility]  Pager: [phone]  Text Page  (8am - 5pm, M-F)    Patient Active Problem List   Diagnosis     Acute pancreatitis, unspecified complication status, unspecified pancreatitis type     RLQ abdominal pain     ACS (acute coronary syndrome) (H)       Physical Exam   Temp: 97.8  F (36.6  C) Temp src: Oral BP: (!) 142/70 Pulse: 77   Resp: 16 SpO2: 94 % O2 Device: None (Room air)    Vitals:    01/17/21 0436 01/18/21 0344 01/19/21 0549   Weight: 71.1 kg (156 lb 11.2 oz) 69.7 kg (153 lb 11.2 oz) 69.3 kg (152 lb 12.8 oz)     Vital Signs with Ranges  Temp:  [97.6  F (36.4  C)-98.6  F (37  C)] 97.8  F (36.6  C)  Pulse:  [72-77] 77  Resp:  [16] 16  BP: (130-169)/(58-88) 142/70  SpO2:  [93 %-97 %] 94 %  I/O last 3 completed shifts:  In: 50 [P.O.:50]  Out: -     Constitutional:  Appears her stated age, well nourished, and in no acute distress.  Eyes: Pupils equal, round. Sclerae anicteric.   HEENT: Normocephalic, atraumatic.   Respiratory: Breathing non-labored. Lungs with mild expiratory wheezes throughout, otherwise clear bilaterally with no crackles.  Cardiovascular: Regular rate and rhythm, normal S1 and  S2. No murmur, rub, or gallop. Radial pulses are full and equal bilaterally.  Skin: Warm, dry. No apparent rashes, cyanosis, edema, or xanthelasma.  Musculoskeletal/Extremities: Moves all extremities well and symmetrically. No lower extremity edema bilaterally.  Neurologic: No gross focal deficits. Alert, cooperative.  Psychiatric: Affect appropriate. Mentation normal.    Medications     - MEDICATION INSTRUCTIONS -         aspirin  81 mg Oral Daily     atorvastatin  40 mg Oral QPM     dextromethorphan  30 mg Oral Q12H NANCY     [Held by provider] donepezil  10 mg Oral At Bedtime     enoxaparin ANTICOAGULANT  40 mg Subcutaneous Q24H     gabapentin  900 mg Oral At Bedtime     metoprolol tartrate  25 mg Oral BID     sodium chloride (PF)  10 mL Intracatheter Q8H     sodium chloride (PF)  3 mL Intracatheter Q8H     Data   Results for orders placed or performed during the hospital encounter of 21 (from the past 24 hour(s))   Echocardiogram Limited    Narrative    [id number]  [id number]  [id number]  [id number]^AUDIE^ABDI           [facility]  Echocardiography Laboratory  [address] [place] [address]        Name: NATALIE LIRA  MRN: [id number]  : 1945  Study Date: 2021 09:01 AM  Age: 75 yrs  Gender: Female  Patient Location: [facility]  Reason For Study: Stress Cardiomyopathy  Ordering Physician: ABDI BRONSON  Performed By: Saadia Tubbs     BSA: 1.7 m2  Height: 62 in  Weight: 156 lb  HR: 82  _____________________________________________________________________________  __        Procedure  Limited Portable Echo Adult.  _____________________________________________________________________________  __        Interpretation Summary     Left ventricular systolic function is moderately reduced.  The visual ejection fraction is estimated at 35-40%.  There are regional wall motion abnormalities as specified.  The study was technically difficult. Compared to the prior study dated  1/4/21,  there have been no changes.  _____________________________________________________________________________  __        Left Ventricle  Left ventricular systolic function is moderately reduced. The visual ejection  fraction is estimated at 35-40%. Mid and distal septal, lateral, anterior,  inferior and apical akinesis. There are regional wall motion abnormalities as  specified.     Right Ventricle  The right ventricle is normal size. The right ventricular systolic function is  normal.     Mitral Valve  There is moderate mitral annular calcification.        Tricuspid Valve  There is mild to moderate (1-2+) tricuspid regurgitation. The right  ventricular systolic pressure is approximated at 42mmHg plus the right atrial  pressure. Right ventricular systolic pressure is elevated, consistent with  mild to moderate pulmonary hypertension.     Aortic Valve  The aortic valve is trileaflet. No aortic regurgitation is present. No aortic  stenosis is present.     Pulmonic Valve  The pulmonic valve is not well visualized.     Pericardium  There is no pericardial effusion.     _____________________________________________________________________________  __                             Report approved by: Kalli Cee 01/18/2021 09:48 AM                    _____________________________________________________________________________  __      Glucose by meter   Result Value Ref Range    Glucose 88 70 - 99 mg/dL   Glucose by meter   Result Value Ref Range    Glucose 107 (H) 70 - 99 mg/dL   NM MPI w Lexiscan   Result Value Ref Range    Target      Baseline Systolic      Baseline Diastolic BP 80     Last Stress Systolic      Last Stress Diastolic BP 83     Baseline HR 72     Max HR 99     Calculated Percent HR 68 %    Rate Pressure Product 16,533.0      This note was completed in part using Dragon voice recognition software. Although reviewed after completion, some word and grammatical errors may  occur.

## 2021-01-19 NOTE — PLAN OF CARE
VSS. Tele: SR w/ invt T's. Denies pain. NPO for ángel scan. L Mid Line: SL Blood return noted. Pt makes needs known, will cont plan of care.

## 2021-01-19 NOTE — PROGRESS NOTES
CLINICAL NUTRITION SERVICES - REASSESSMENT NOTE    Recommendations Ordered by Registered Dietitian (RD):     Updated oral nutrition supplements     MVI+M   Malnutrition:   % Weight Loss: unable to determine   % Intake:  </= 50% for >/= 5 days (severe malnutrition)  Subcutaneous Fat Loss: Moderate, as outlined below  Muscle Loss: Moderate, as outlined below  Fluid Retention:  None noted    Malnutrition Diagnosis: Severe malnutrition  In Context of:  Acute illness or injury with underlying chronic illness or disease     EVALUATION OF PROGRESS TOWARD GOALS/NEW FINDINGS   Progress towards goals will be monitored and evaluated per protocol and Practice Guidelines    Diet order: Regular, no caffeine   Supplements: Boost prn   Per flow sheet review, 0-25% intake for majority of documented meals, very poor appetite ongoing (a few at % intake although very small meals). Meeting <50% of needs since admit  Info obtained from patient:   Eating meals TID until one week prior to admission when appetite declined, Poor appetite with little interest in eating. No nausea, increased pain with intake, bowel pattern changes, taste changes or diarrhea with PO intake. Denied previous diet restrictions. She and spouse share grocery shopping and cooking responsibilities. Denied food access concerns. Denies issues chewing or swallowing foods.      BM: 1/19    Forgetful    Skin: bruised, dry, cool, pale    Updated nutrition focused physical exam: moderate muscle depletion observed in temporal region, upper body (clavicle, scapular, acromion), lower extremities (calves, patellar, upper thigh); moderate to severe depletion in hands. Moderate fat depletion in orbital region, moderate or greater depletion in upper and lower arms. Bruising on arms.    Fluid: no edema    Weight: 69 kg - relatively stable since admit; patient endorses recent weight loss, unsure of amount    Generalized weakness     Milton nutrition score: 3; total score:  19    I/O last 3 completed shifts:    In: 50 [P.O.:50]    Out: -     Labs:    Electrolytes  Potassium (mmol/L)   Date Value   01/19/2021 3.7   01/18/2021 4.4   01/17/2021 3.9        Blood Glucose  Glucose (mg/dL)   Date Value   01/19/2021 96   01/16/2021 124 (H)   01/15/2021 68 (L)   01/13/2021 94   01/10/2021 55 (L)        Inflammatory Markers  WBC (10e9/L)   Date Value   01/18/2021 5.7   01/16/2021 4.9   01/15/2021 6.2     Albumin (g/dL)   Date Value   01/19/2021 2.2 (L)   01/13/2021 2.5 (L)   01/08/2021 3.7        Magnesium (mg/dL)   Date Value   01/12/2021 2.0   01/11/2021 1.5 (L)   01/11/2021 1.4 (L)     Sodium (mmol/L)   Date Value   01/19/2021 141   01/16/2021 143   01/15/2021 144        Renal  Urea Nitrogen (mg/dL)   Date Value   01/19/2021 12   01/16/2021 10   01/15/2021 11     Creatinine (mg/dL)   Date Value   01/19/2021 1.17 (H)   01/16/2021 0.93   01/15/2021 1.10 (H)         Additional  Triglycerides (mg/dL)   Date Value   01/14/2021 101   01/09/2021 120     Ketones Urine (mg/dL)   Date Value   01/13/2021 10 (A)        Meds:    aspirin  81 mg Oral Daily     atorvastatin  40 mg Oral QPM     dextromethorphan  30 mg Oral Q12H NANCY     [Held by provider] donepezil  10 mg Oral At Bedtime     enoxaparin ANTICOAGULANT  40 mg Subcutaneous Q24H     gabapentin  900 mg Oral At Bedtime     metoprolol tartrate  25 mg Oral BID     sodium chloride (PF)  10 mL Intracatheter Q8H     sodium chloride (PF)  3 mL Intracatheter Q8H      ASSESSED NUTRITION NEEDS (PER APPROVED PRACTICE GUIDELINES; DW: 69 kg):  Estimated Energy Needs: 6068-7793 kcals (25-30 Kcal/Kg)   Justification: maintenance   Estimated Protein Needs:  grams protein (1-1.5 g pro/Kg)   Justification: hypercatabolism with acute illness   Estimated Fluid Needs: per MD     Previous Goals:   Patient to consume >/= 50-75% of meals TID or oral nutrition supplements equivalent  Evaluation: Not met    Previous Nutrition Diagnosis:   Predicted inadequate nutrient  intake (protein energy) related to altered GI function, repeat hospitalization as evidenced by diet recall  Evaluation: Declining, updated below    CURRENT NUTRITION DIAGNOSIS  Inadequate protein-energy intake related to poor appetite as evidenced by minimal PO intake x 6 days, severe malnutrition criteria met    INTERVENTIONS  Recommendations / Nutrition Prescription  Continue regular diet as ordered + oral nutrition supplements as accepted  Multivitamin+Mineral    Implementation  Nutrition education: encouraged bites/sips as able with increased frequency, role of adequate calorie + protein intake to maintain lean body mass, strength  Multivitamin/Minerals: Thera-Vit-M  Medical food supplement: high protein shake 10-2  Collaboration and Referral of care: Discussed patient during interdisciplinary care rounds this morning    Goals  Patient to consume >/=25-50% of meals TID and >/=2 high protein supplements per day      MONITORING AND EVALUATION:  Progress towards goals will be monitored and evaluated per protocol and Practice Guidelines      Nayeli Coles MS, RDN-AP, LD, CNSC  Pager - 3rd floor/ICU: [phone]  Pager - All other floors: [phone]  Pager - Weekend/holiday: [phone]  Office: [phone]

## 2021-01-20 ENCOUNTER — ANESTHESIA (OUTPATIENT)
Dept: SURGERY | Facility: CLINIC | Age: 76
DRG: 374 | End: 2021-01-20
Payer: MEDICARE

## 2021-01-20 ENCOUNTER — APPOINTMENT (OUTPATIENT)
Dept: SURGERY | Facility: PHYSICIAN GROUP | Age: 76
End: 2021-01-20
Payer: MEDICARE

## 2021-01-20 ENCOUNTER — ANESTHESIA EVENT (OUTPATIENT)
Dept: SURGERY | Facility: CLINIC | Age: 76
DRG: 374 | End: 2021-01-20
Payer: MEDICARE

## 2021-01-20 LAB
GLUCOSE BLDC GLUCOMTR-MCNC: 81 MG/DL (ref 70–99)
GLUCOSE BLDC GLUCOMTR-MCNC: 84 MG/DL (ref 70–99)
GLUCOSE BLDC GLUCOMTR-MCNC: 93 MG/DL (ref 70–99)
NT-PROBNP SERPL-MCNC: ABNORMAL PG/ML (ref 0–1800)
TSH SERPL DL<=0.005 MIU/L-ACNC: 1.12 MU/L (ref 0.4–4)

## 2021-01-20 PROCEDURE — 88342 IMHCHEM/IMCYTCHM 1ST ANTB: CPT | Mod: TC | Performed by: SURGERY

## 2021-01-20 PROCEDURE — 999N001018 HC STATISTIC H-CELL BLOCK W/STAIN: Performed by: SURGERY

## 2021-01-20 PROCEDURE — 250N000011 HC RX IP 250 OP 636: Performed by: INTERNAL MEDICINE

## 2021-01-20 PROCEDURE — 999N000141 HC STATISTIC PRE-PROCEDURE NURSING ASSESSMENT: Performed by: SURGERY

## 2021-01-20 PROCEDURE — 88112 CYTOPATH CELL ENHANCE TECH: CPT | Mod: TC | Performed by: SURGERY

## 2021-01-20 PROCEDURE — 258N000003 HC RX IP 258 OP 636: Performed by: NURSE ANESTHETIST, CERTIFIED REGISTERED

## 2021-01-20 PROCEDURE — 88377 M/PHMTRC ALYS ISHQUANT/SEMIQ: CPT | Mod: TC

## 2021-01-20 PROCEDURE — 710N000009 HC RECOVERY PHASE 1, LEVEL 1, PER MIN: Performed by: SURGERY

## 2021-01-20 PROCEDURE — 250N000011 HC RX IP 250 OP 636: Performed by: SURGERY

## 2021-01-20 PROCEDURE — 250N000011 HC RX IP 250 OP 636: Performed by: NURSE ANESTHETIST, CERTIFIED REGISTERED

## 2021-01-20 PROCEDURE — 88377 M/PHMTRC ALYS ISHQUANT/SEMIQ: CPT | Mod: 26 | Performed by: MEDICAL GENETICS

## 2021-01-20 PROCEDURE — 360N000083 HC SURGERY LEVEL 3 W/ FLUORO, PER MIN: Performed by: SURGERY

## 2021-01-20 PROCEDURE — 88305 TISSUE EXAM BY PATHOLOGIST: CPT | Mod: TC | Performed by: SURGERY

## 2021-01-20 PROCEDURE — 999N001014 HC STATISTIC CYTO WRIGHT STAIN TC: Performed by: SURGERY

## 2021-01-20 PROCEDURE — 999N001017 HC STATISTIC GLUCOSE BY METER IP

## 2021-01-20 PROCEDURE — 88305 TISSUE EXAM BY PATHOLOGIST: CPT | Mod: 26

## 2021-01-20 PROCEDURE — 250N000011 HC RX IP 250 OP 636: Performed by: ANESTHESIOLOGY

## 2021-01-20 PROCEDURE — 120N000001 HC R&B MED SURG/OB

## 2021-01-20 PROCEDURE — 77412 RADIATION TX DELIVERY LVL 3: CPT

## 2021-01-20 PROCEDURE — 88112 CYTOPATH CELL ENHANCE TECH: CPT | Mod: 26

## 2021-01-20 PROCEDURE — 272N000001 HC OR GENERAL SUPPLY STERILE: Performed by: SURGERY

## 2021-01-20 PROCEDURE — 999N001019 HC STATISTIC H-FISH PROCESS B/S: Performed by: SURGERY

## 2021-01-20 PROCEDURE — 250N000013 HC RX MED GY IP 250 OP 250 PS 637: Performed by: INTERNAL MEDICINE

## 2021-01-20 PROCEDURE — 250N000013 HC RX MED GY IP 250 OP 250 PS 637: Performed by: SURGERY

## 2021-01-20 PROCEDURE — 250N000009 HC RX 250: Performed by: SURGERY

## 2021-01-20 PROCEDURE — 88342 IMHCHEM/IMCYTCHM 1ST ANTB: CPT | Mod: 26

## 2021-01-20 PROCEDURE — 370N000017 HC ANESTHESIA TECHNICAL FEE, PER MIN: Performed by: SURGERY

## 2021-01-20 PROCEDURE — 88360 TUMOR IMMUNOHISTOCHEM/MANUAL: CPT | Mod: TC | Performed by: SURGERY

## 2021-01-20 PROCEDURE — 88341 IMHCHEM/IMCYTCHM EA ADD ANTB: CPT | Mod: 26

## 2021-01-20 PROCEDURE — 258N000001 HC RX 258: Performed by: SURGERY

## 2021-01-20 PROCEDURE — 250N000009 HC RX 250: Performed by: NURSE ANESTHETIST, CERTIFIED REGISTERED

## 2021-01-20 PROCEDURE — 77336 RADIATION PHYSICS CONSULT: CPT

## 2021-01-20 PROCEDURE — 0DBW4ZX EXCISION OF PERITONEUM, PERCUTANEOUS ENDOSCOPIC APPROACH, DIAGNOSTIC: ICD-10-PCS | Performed by: SURGERY

## 2021-01-20 PROCEDURE — 99232 SBSQ HOSP IP/OBS MODERATE 35: CPT | Performed by: INTERNAL MEDICINE

## 2021-01-20 PROCEDURE — 999N001020 HC STATISTIC H-SEND OUTS PREP: Performed by: SURGERY

## 2021-01-20 PROCEDURE — 88341 IMHCHEM/IMCYTCHM EA ADD ANTB: CPT | Mod: TC | Performed by: SURGERY

## 2021-01-20 RX ORDER — KETOROLAC TROMETHAMINE 30 MG/ML
INJECTION, SOLUTION INTRAMUSCULAR; INTRAVENOUS PRN
Status: DISCONTINUED | OUTPATIENT
Start: 2021-01-20 | End: 2021-01-20

## 2021-01-20 RX ORDER — LIDOCAINE HYDROCHLORIDE 10 MG/ML
INJECTION, SOLUTION INFILTRATION; PERINEURAL PRN
Status: DISCONTINUED | OUTPATIENT
Start: 2021-01-20 | End: 2021-01-20

## 2021-01-20 RX ORDER — BUPIVACAINE HYDROCHLORIDE AND EPINEPHRINE 5; 5 MG/ML; UG/ML
INJECTION, SOLUTION PERINEURAL PRN
Status: DISCONTINUED | OUTPATIENT
Start: 2021-01-20 | End: 2021-01-20 | Stop reason: HOSPADM

## 2021-01-20 RX ORDER — HYDROMORPHONE HYDROCHLORIDE 1 MG/ML
.3-.5 INJECTION, SOLUTION INTRAMUSCULAR; INTRAVENOUS; SUBCUTANEOUS EVERY 5 MIN PRN
Status: DISCONTINUED | OUTPATIENT
Start: 2021-01-20 | End: 2021-01-20 | Stop reason: HOSPADM

## 2021-01-20 RX ORDER — FENTANYL CITRATE 50 UG/ML
25-50 INJECTION, SOLUTION INTRAMUSCULAR; INTRAVENOUS
Status: DISCONTINUED | OUTPATIENT
Start: 2021-01-20 | End: 2021-01-20 | Stop reason: HOSPADM

## 2021-01-20 RX ORDER — PROPOFOL 10 MG/ML
INJECTION, EMULSION INTRAVENOUS PRN
Status: DISCONTINUED | OUTPATIENT
Start: 2021-01-20 | End: 2021-01-20

## 2021-01-20 RX ORDER — CEFAZOLIN SODIUM 1 G/3ML
1 INJECTION, POWDER, FOR SOLUTION INTRAMUSCULAR; INTRAVENOUS SEE ADMIN INSTRUCTIONS
Status: DISCONTINUED | OUTPATIENT
Start: 2021-01-20 | End: 2021-01-20 | Stop reason: HOSPADM

## 2021-01-20 RX ORDER — FENTANYL CITRATE 50 UG/ML
INJECTION, SOLUTION INTRAMUSCULAR; INTRAVENOUS PRN
Status: DISCONTINUED | OUTPATIENT
Start: 2021-01-20 | End: 2021-01-20

## 2021-01-20 RX ORDER — ONDANSETRON 2 MG/ML
4 INJECTION INTRAMUSCULAR; INTRAVENOUS EVERY 30 MIN PRN
Status: DISCONTINUED | OUTPATIENT
Start: 2021-01-20 | End: 2021-01-20 | Stop reason: HOSPADM

## 2021-01-20 RX ORDER — ONDANSETRON 4 MG/1
4 TABLET, ORALLY DISINTEGRATING ORAL EVERY 30 MIN PRN
Status: DISCONTINUED | OUTPATIENT
Start: 2021-01-20 | End: 2021-01-20 | Stop reason: HOSPADM

## 2021-01-20 RX ORDER — SODIUM CHLORIDE, SODIUM LACTATE, POTASSIUM CHLORIDE, CALCIUM CHLORIDE 600; 310; 30; 20 MG/100ML; MG/100ML; MG/100ML; MG/100ML
INJECTION, SOLUTION INTRAVENOUS CONTINUOUS PRN
Status: DISCONTINUED | OUTPATIENT
Start: 2021-01-20 | End: 2021-01-20

## 2021-01-20 RX ORDER — EPHEDRINE SULFATE 50 MG/ML
INJECTION, SOLUTION INTRAMUSCULAR; INTRAVENOUS; SUBCUTANEOUS PRN
Status: DISCONTINUED | OUTPATIENT
Start: 2021-01-20 | End: 2021-01-20

## 2021-01-20 RX ORDER — SODIUM CHLORIDE, SODIUM LACTATE, POTASSIUM CHLORIDE, CALCIUM CHLORIDE 600; 310; 30; 20 MG/100ML; MG/100ML; MG/100ML; MG/100ML
INJECTION, SOLUTION INTRAVENOUS CONTINUOUS
Status: DISCONTINUED | OUTPATIENT
Start: 2021-01-20 | End: 2021-01-20 | Stop reason: HOSPADM

## 2021-01-20 RX ORDER — METOPROLOL TARTRATE 1 MG/ML
1-2 INJECTION, SOLUTION INTRAVENOUS EVERY 5 MIN PRN
Status: DISCONTINUED | OUTPATIENT
Start: 2021-01-20 | End: 2021-01-20 | Stop reason: HOSPADM

## 2021-01-20 RX ORDER — CEFAZOLIN SODIUM 2 G/100ML
2 INJECTION, SOLUTION INTRAVENOUS
Status: COMPLETED | OUTPATIENT
Start: 2021-01-20 | End: 2021-01-20

## 2021-01-20 RX ADMIN — Medication 2 UNITS: at 11:33

## 2021-01-20 RX ADMIN — HYDROMORPHONE HYDROCHLORIDE 0.2 MG: 1 INJECTION, SOLUTION INTRAMUSCULAR; INTRAVENOUS; SUBCUTANEOUS at 05:05

## 2021-01-20 RX ADMIN — Medication 5 MG: at 11:30

## 2021-01-20 RX ADMIN — PROPOFOL 100 MG: 10 INJECTION, EMULSION INTRAVENOUS at 11:23

## 2021-01-20 RX ADMIN — FENTANYL CITRATE 50 MCG: 50 INJECTION, SOLUTION INTRAMUSCULAR; INTRAVENOUS at 13:18

## 2021-01-20 RX ADMIN — CEFAZOLIN SODIUM 2 G: 2 INJECTION, SOLUTION INTRAVENOUS at 11:29

## 2021-01-20 RX ADMIN — Medication 10 MG: at 12:16

## 2021-01-20 RX ADMIN — SUGAMMADEX 200 MG: 100 INJECTION, SOLUTION INTRAVENOUS at 12:15

## 2021-01-20 RX ADMIN — FENTANYL CITRATE 50 MCG: 50 INJECTION, SOLUTION INTRAMUSCULAR; INTRAVENOUS at 13:04

## 2021-01-20 RX ADMIN — LIDOCAINE HYDROCHLORIDE 30 MG: 10 INJECTION, SOLUTION INFILTRATION; PERINEURAL at 11:23

## 2021-01-20 RX ADMIN — FENTANYL CITRATE 100 MCG: 50 INJECTION, SOLUTION INTRAMUSCULAR; INTRAVENOUS at 11:23

## 2021-01-20 RX ADMIN — ACETAMINOPHEN 650 MG: 325 TABLET, FILM COATED ORAL at 19:45

## 2021-01-20 RX ADMIN — SODIUM CHLORIDE, POTASSIUM CHLORIDE, SODIUM LACTATE AND CALCIUM CHLORIDE: 600; 310; 30; 20 INJECTION, SOLUTION INTRAVENOUS at 12:19

## 2021-01-20 RX ADMIN — ONDANSETRON 4 MG: 2 INJECTION INTRAMUSCULAR; INTRAVENOUS at 12:05

## 2021-01-20 RX ADMIN — SODIUM CHLORIDE, POTASSIUM CHLORIDE, SODIUM LACTATE AND CALCIUM CHLORIDE: 600; 310; 30; 20 INJECTION, SOLUTION INTRAVENOUS at 10:45

## 2021-01-20 RX ADMIN — ROCURONIUM BROMIDE 30 MG: 10 INJECTION INTRAVENOUS at 11:23

## 2021-01-20 RX ADMIN — ATORVASTATIN CALCIUM 40 MG: 40 TABLET, FILM COATED ORAL at 19:46

## 2021-01-20 RX ADMIN — Medication 5 MG: at 11:29

## 2021-01-20 RX ADMIN — KETOROLAC TROMETHAMINE 15 MG: 30 INJECTION, SOLUTION INTRAMUSCULAR at 12:34

## 2021-01-20 RX ADMIN — Medication 1 MG: at 23:54

## 2021-01-20 RX ADMIN — Medication 1 MG: at 02:45

## 2021-01-20 RX ADMIN — ACETAMINOPHEN 650 MG: 325 TABLET, FILM COATED ORAL at 00:03

## 2021-01-20 RX ADMIN — DEXTROMETHORPHAN POLISTIREX 30 MG: 30 SUSPENSION ORAL at 19:46

## 2021-01-20 RX ADMIN — HYDROMORPHONE HYDROCHLORIDE 0.2 MG: 1 INJECTION, SOLUTION INTRAMUSCULAR; INTRAVENOUS; SUBCUTANEOUS at 15:44

## 2021-01-20 RX ADMIN — METOPROLOL TARTRATE 25 MG: 25 TABLET, FILM COATED ORAL at 19:46

## 2021-01-20 RX ADMIN — GABAPENTIN 900 MG: 300 CAPSULE ORAL at 21:46

## 2021-01-20 ASSESSMENT — ACTIVITIES OF DAILY LIVING (ADL)
ADLS_ACUITY_SCORE: 18
ADLS_ACUITY_SCORE: 15
ADLS_ACUITY_SCORE: 15
ADLS_ACUITY_SCORE: 17
ADLS_ACUITY_SCORE: 17

## 2021-01-20 ASSESSMENT — ENCOUNTER SYMPTOMS
SEIZURES: 0
DYSRHYTHMIAS: 0

## 2021-01-20 ASSESSMENT — MIFFLIN-ST. JEOR: SCORE: 1134.54

## 2021-01-20 NOTE — ANESTHESIA PREPROCEDURE EVALUATION
Anesthesia Pre-Procedure Evaluation    Patient: Lisa Galloway   MRN: [id number] : 1945        Preoperative Diagnosis: Gallstone pancreatitis [K85.10]   Procedure : Procedure(s):  CHOLECYSTECTOMY, LAPAROSCOPIC, WITH CHOLANGIOGRAM     History reviewed. No pertinent past medical history.   History reviewed. No pertinent surgical history.   Allergies   Allergen Reactions     Amoxicillin-Pot Clavulanate Itching     Erythromycin Nausea     Latex      PN: Latex Sensitivity Flag     Levofloxacin      PN: joint pain     Sulfa Drugs GI Disturbance     Oxycodone Rash      Social History     Tobacco Use     Smoking status: Never Smoker     Smokeless tobacco: Never Used   Substance Use Topics     Alcohol use: Not Currently      Wt Readings from Last 1 Encounters:   21 68.6 kg (151 lb 4.8 oz)        Anesthesia Evaluation            ROS/MED HX  ENT/Pulmonary:     (+) asthma  (-) sleep apnea   Neurologic:     (+) dementia,  (-) no seizures, no CVA and no TIA   Cardiovascular:     (+) Dyslipidemia hypertension--CAD -past MI --CHF etiology: Ischemic CM Last EF: 30-40%  (-) arrhythmias and pulmonary hypertension   METS/Exercise Tolerance:     Hematologic:    (-) anemia   Musculoskeletal:   (+) arthritis,     GI/Hepatic:     (+) cholecystitis/cholelithiasis (Symptomatic gallstone pancreatitis),  (-) GERD   Renal/Genitourinary:    (-) renal disease   Endo:  - neg endo ROS     Psychiatric/Substance Use:    (-) psychiatric history   Infectious Disease:  - neg infectious disease ROS     Malignancy: Comment: metastatic  (+) Malignancy, History of Breast.Breast CA Active status post.        Other:            Physical Exam    Airway        Mallampati: II   TM distance: > 3 FB   Neck ROM: full   Mouth opening: > 3 cm    Respiratory Devices and Support         Dental           Cardiovascular          Rhythm and rate: regular and normal     Pulmonary           breath sounds clear to auscultation   (-) no rales    Other  findings: Lab Test        01/19/21     01/18/21     01/16/21     01/15/21                       1230          0740          0452          0545          WBC           --          5.7          4.9          6.2           HGB           --          10.1*        8.8*         8.8*          MCV           --          105*         110*         110*          PLT          340          316          261          239           INR           --           --          1.14          --            Lab Test        01/19/21     01/18/21     01/17/21     01/16/21     01/16/21     01/15/21                       1230          0740          0645          0452          0452          0545          NA           141           --           --           --          143          144           POTASSIUM    3.7          4.4          3.9            < >        3.4          3.5           CHLORIDE     112*          --           --           --          115*         115*          CO2          25            --           --           --          26           25            BUN          12            --           --           --          10           11            CR           1.17*         --           --           --          0.93         1.10*         ANIONGAP     4             --           --           --          2*           4             EARL          7.8*          --           --           --          7.5*         7.1*          GLC          96            --           --           --          124*         68*            < > = values in this interval not displayed.                   OUTSIDE LABS:  CBC:   Lab Results   Component Value Date    WBC 5.7 01/18/2021    WBC 4.9 01/16/2021    HGB 10.1 (L) 01/18/2021    HGB 8.8 (L) 01/16/2021    HCT 28.7 (L) 01/18/2021    HCT 26.3 (L) 01/16/2021     01/19/2021     01/18/2021     BMP:   Lab Results   Component Value Date     01/19/2021     01/16/2021    POTASSIUM 3.7 01/19/2021     POTASSIUM 4.4 01/18/2021    CHLORIDE 112 (H) 01/19/2021    CHLORIDE 115 (H) 01/16/2021    CO2 25 01/19/2021    CO2 26 01/16/2021    BUN 12 01/19/2021    BUN 10 01/16/2021    CR 1.17 (H) 01/19/2021    CR 0.93 01/16/2021    GLC 96 01/19/2021     (H) 01/16/2021     COAGS:   Lab Results   Component Value Date    PTT 34 01/13/2021    INR 1.14 01/16/2021     POC:   Lab Results   Component Value Date    BGM 81 01/20/2021     HEPATIC:   Lab Results   Component Value Date    ALBUMIN 2.2 (L) 01/19/2021    PROTTOTAL 5.3 (L) 01/19/2021    ALT 32 01/19/2021    AST 41 01/19/2021    ALKPHOS 59 01/19/2021    BILITOTAL 0.5 01/19/2021     OTHER:   Lab Results   Component Value Date    EARL 7.8 (L) 01/19/2021    MAG 2.0 01/12/2021    LIPASE 460 (H) 01/14/2021    TSH 1.12 01/19/2021       Anesthesia Plan     History & Physical Review      ASA Status:  4.   Plan for General with Propofol induction. Device: ETT Maintenance will be Balanced.         PONV prophylaxis:  Ondansetron (or other 5HT-3).       Consents  Anesthesia Plan(s) and associated risks, benefits, and realistic alternatives discussed.    Questions answered and patient/representative(s) expressed understanding.    Discussed with:  Patient.             Postoperative Care  Postoperative pain management: IV analgesics and Oral pain medications.           Ketan Mitchell MD

## 2021-01-20 NOTE — PROGRESS NOTES
"    [facility]  Hospitalist Progress Note  Mikel Ortega MD 01/20/2021    Reason for Stay (Diagnosis): CM, recent gallstone pancreatitis         Assessment and Plan:      Summary of Stay: Lisa Galloway is a 75 year old female with no prior cardiac history, but history of hyperlipidemia (not on statin therapy), metastatic breast cancer, recently diagnosed pancreatitis, likely from gallstones, GERD, and asthma. She was admitted on 1/13/2021 after presenting with recurrent abdominal pain. Cardiology was consulted due to an elevated troponin.     The patient has been medically stable since being hospitalized.  She denies any chest pain.       Cardiology and surgery are following.       Cardiology ordered a Lexiscan stress test which showed previous apical MI with only mild mendel-infarct ischemia.       Surgery is waiting for medical clearance after cardiac work up to proceed with lap felecia in the setting of her recent episode of gallstone pancreatitis.       ADDENDUM:  Pt taken to OR today for lap felecia.  Procedure complicated by dense adhesions and felecia not performed.    Per surgical note findings:  \"cholecystectomy aborted due to dense adhesions near the triangle calot as we saw peritoneal studding, and copious amounts of ascites fluid - area of omentum biopied that was adhered to the anterior abdominal wall and fluid sent for cytology. With her history of metastatic breast cancer this could represent peritoneal mets.\"  I communicated with Dr. Sarabia after the surgery and she did not feel gallbladder drainage or cholecystostomy tube required at this time    Pt appetite has been poor while hospitalized with minimal PO intake.  Would anticipate discharge within the next 1-2 days assuming able to maintain adequate caloric intake    Addendum:  I called and updated spouse Margareth again today regarding above.       Assessment & Plan     1. Elevated troponin/NSTEMI with newly dx CM  - Troponin " "elevated to a peak of 2.8  - suspected Type 2 MI/demand ischemia   - TTE 1/13 showing LVEF 35-40% with distal anterolateral wall and inferior wall hypokinesis and dyskinesis of the apex consistent with a possible stress cardiomyopathy.   - Repeat limited echocardiogram 1/17 appears similar with LVEF 35-40% with WMAs unchanged.  - Lexiscan on 1/19 shows previous infarction in LAD distribution with only mild mendel-infarct ischemia and cardiology OK with surgery if small to medium amount of ischemia present.  Pt remains asymptomatic from a cardiac perspective and I do not anticipate any further cardiac work up prior to surgery  - Pt appears medically optimized for surgery as able and will reconsult gen surg to assist with timing of lap felecia.     2.  Recent gallstone pancreatitis  - surgery following - planning lap felecia after cleared by cardiology     3.  History of metastatic breast cancer; receiving XRT     4.  History of bronchial asthma not in exacerbation     5.  Stable Macrocytic anemia  - work up unremarkable including ferritin, TIBC, B12, folate levels.  No e/o iron deficiency     6.  Chronic kidney disease with baseline creatinine 1.2-1.3    7.  Prolonged QT interval:  Only potential culprit medication is Aricept which has been stopped per cardiology recommendation    8.  Severe malnutrition in the context of acute illness or injury with underlying chronic illness or disease          Code:  Full  DVT ppx:  lovenox  Dispo:  Unclear; await results of cards work up and surgical procedure           Interval History (Subjective):      No complaints.  No CP.  No SOB.  No abd pain                  Physical Exam:      Last Vital Signs:  BP (!) 143/60 (BP Location: Left leg)   Pulse 64   Temp 97.9  F (36.6  C) (Oral)   Resp 18   Ht 1.575 m (5' 2\")   Wt 68.6 kg (151 lb 4.8 oz)   SpO2 92%   BMI 27.67 kg/m        Intake/Output Summary (Last 24 hours) at 1/20/2021 0856  Last data filed at 1/19/2021 3349  Gross per " 24 hour   Intake 300 ml   Output --   Net 300 ml       Constitutional: Awake, alert, cooperative, no apparent distress   Respiratory: Clear to auscultation bilaterally, no crackles or wheezing   Cardiovascular: Regular rate and rhythm, normal S1 and S2, and no murmur noted   Abdomen: Normal bowel sounds, soft, non-distended, non-tender   Skin: No rashes, no cyanosis, dry to touch   Neuro: Alert and oriented x3, no weakness, numbness, memory loss   Extremities: No edema, normal range of motion   Other(s):        All other systems: Negative          Medications:      All current medications were reviewed with changes reflected in problem list.         Data:      All new lab and imaging data was reviewed.   Labs:  Recent Labs   Lab 01/19/21  1230 01/18/21  0740   WBC  --  5.7   HGB  --  10.1*   HCT  --  28.7*   MCV  --  105*    316      Imaging:   Recent Results (from the past 24 hour(s))   NM MPI w Lexiscan   Result Value    Target     Baseline Systolic     Baseline Diastolic BP 80    Last Stress Systolic     Last Stress Diastolic BP 83    Baseline HR 72    Max HR 99    Calculated Percent HR 68    Rate Pressure Product 16,533.0    Nuc Rest EF 59    Left Ventricular EF 62    Narrative       The nuclear stress test is abnormal.     There is a medium sized area of a severe degree of infarction in the   apical segment(s) of the left ventricle. This appears to be in the left   anterior descending distribution. There is mild mendel-infarct ischemia.     Left ventricular function is normal.     The left ventricular ejection fraction at rest is 59%.  The left   ventricular ejection fraction at stress is 62%.     There is no prior study for comparison.

## 2021-01-20 NOTE — PROGRESS NOTES
Cardiology Progress Note  Paty PRADO, CNP          Assessment and Plan:     Admit (1/13/21) with ongoing abdominal pain, nausea, poor appetite.  Recently discharged from hospital for pancreatitis.  Evidence of NSTEMI    PMH:  Metastatic breat cancer, hyperlipidemia, recent diagnosed pancreatitis likely from gallstones, GERD, asthma, memory issues    NSTEMI:  -in setting of pancreatitis  -troponin peak 2.8 / no significant ST abnormalities  -ECHO: LVEF 35-40% with mid/distal septal, lateral, anterior, inferior, apical akinesis, normal RV function, mild/moderate pulmonary hypertension, 1-2+ TR  -Erin NUC (1/19/21) showed medium sized infarct in the apical segment in LAD distribution with mild mendel-infarct ischemia  LVEF 59%  -denies CP  -Metoprolol and Atorvastatin have been started / ASA 81 mg      Gallstone Pancreatitis / Cholecystitis  -OK from cardiology standpoint to proceed with laparoscopic cholecystectomy  -albumin level quite low    Ischemic Cardiomyopathy  -LVEF 35-40% with stress-induced WMA vs LAD  -no significant signs and symptoms of HF  -weight stable (151 lbs)  -consider changing Metoprolol to XR post surgery    Hypertension:  -was not on antihypertensive agents prior to admission  -consider increasing Metoprolol and/or starting Lisinopril    Prolonged QT interval  -QTc 509 ms  -etiology unclear.  Discontinuation of Aricept recommended    Metastatic Breast Cancer  -currently on radiation therapy due to spine metastasis    Stage II CKD  -Cr. 1.17               Interval History:     No CP, SOB, or palpitations.  Intermittent abdominal pain treated with narcotics                Medications:       aspirin  81 mg Oral Daily     atorvastatin  40 mg Oral QPM     dextromethorphan  30 mg Oral Q12H NANCY     [Held by provider] donepezil  10 mg Oral At Bedtime     enoxaparin ANTICOAGULANT  40 mg Subcutaneous Q24H     gabapentin  900 mg Oral At Bedtime     metoprolol tartrate  25 mg Oral BID     multivitamin  "w/minerals  1 tablet Oral Daily     sodium chloride (PF)  10 mL Intracatheter Q8H     sodium chloride (PF)  3 mL Intracatheter Q8H            Physical Exam:   Blood pressure (!) 143/60, pulse 64, temperature 97.9  F (36.6  C), temperature source Oral, resp. rate 18, height 1.575 m (5' 2\"), weight 68.6 kg (151 lb 4.8 oz), SpO2 92 %, not currently breastfeeding.  Wt Readings from Last 3 Encounters:   01/20/21 68.6 kg (151 lb 4.8 oz)   01/09/21 64 kg (141 lb)     I/O last 3 completed shifts:  In: 300 [P.O.:300]  Out: -     CONST:  Alert and oriented  NAD  LUNGS:  CTA bilaterally  CARDIO:  RRR, S1, S2  No murmurs  ABD:  distended  EXT:  No edema  2+ DP bilaterally           Data:   TELE:  SR  Brief run of PSVT overnight    CBC  Recent Labs   Lab 01/19/21  1230 01/18/21  0740 01/16/21  0452   WBC  --  5.7 4.9   HGB  --  10.1* 8.8*    316 261       BMP  Recent Labs   Lab 01/19/21  1230 01/18/21  0740 01/17/21  0645 01/17/21  0047 01/16/21  0452 01/15/21  0545 01/13/21 2037 01/13/21 2037     --   --   --  143 144  --  142   POTASSIUM 3.7 4.4 3.9 3.7 3.4 3.5   < > 3.1*   CHLORIDE 112*  --   --   --  115* 115*  --  111*   EARL 7.8*  --   --   --  7.5* 7.1*  --  8.3*   CO2 25  --   --   --  26 25  --  26   BUN 12  --   --   --  10 11  --  6*   CR 1.17*  --   --   --  0.93 1.10*  --  0.99   GLC 96  --   --   --  124* 68*  --  94    < > = values in this interval not displayed.     Recent Labs   Lab Test 01/14/21  0650 01/09/21  0648   CHOL 145  --    HDL 49*  --    LDL 76  --    TRIG 101 120       TROP  Lab Results   Component Value Date    TROPI 1.202 () 01/14/2021    TROPI 1.733 () 01/14/2021    TROPI 2.852 () 01/13/2021       BNP  No results for input(s): NTBNPI, NTBNP in the last 168 hours.        "

## 2021-01-20 NOTE — ANESTHESIA CARE TRANSFER NOTE
Patient: Lisa Galloway    Procedure(s):  LAPAROSCOpy diagnostic, peritoneal biopsy,    Diagnosis: Gallstone pancreatitis [K85.10]  Diagnosis Additional Information: No value filed.    Anesthesia Type:   No value filed.     Note:      Level of Consciousness: awake  Oxygen Supplementation: face mask    Independent Airway: airway patency satisfactory and stable  Dentition: dentition unchanged  Vital Signs Stable: post-procedure vital signs reviewed and stable  Report to RN Given: handoff report given  Patient transferred to: PACU    Handoff Report: Identifed the Patient, Identified the Reponsible Provider, Reviewed the pertinent medical history, Discussed the surgical course, Reviewed Intra-OP anesthesia mangement and issues during anesthesia, Set expectations for post-procedure period and Allowed opportunity for questions and acknowledgement of understanding      Vitals: (Last set prior to Anesthesia Care Transfer)  CRNA VITALS  1/20/2021 1206 - 1/20/2021 1243      1/20/2021             NIBP:  98/59    Pulse:  77    NIBP Mean:  80    SpO2:  93 %        Electronically Signed By: JOHAN Abrams CRNA  January 20, 2021  12:43 PM

## 2021-01-20 NOTE — ANESTHESIA POSTPROCEDURE EVALUATION
Patient: Lisa Galloway    Procedure(s):  LAPAROSCOpy diagnostic, peritoneal biopsy,    Diagnosis:Gallstone pancreatitis [K85.10]  Diagnosis Additional Information: No value filed.    Anesthesia Type:  General    Note:  Disposition: Inpatient   Postop Pain Control: Uneventful            Sign Out: Well controlled pain   PONV: No   Neuro/Psych: Uneventful            Sign Out: Acceptable/Baseline neuro status   Airway/Respiratory: Uneventful            Sign Out: Acceptable/Baseline resp. status   CV/Hemodynamics: Uneventful            Sign Out: Acceptable CV status   Other NRE: NONE   DID A NON-ROUTINE EVENT OCCUR? No         Last vitals:  Vitals:    01/20/21 1407 01/20/21 1413 01/20/21 1422   BP: 121/56  133/49   Pulse: 63  68   Resp: 16  10   Temp: 96.6  F (35.9  C)     SpO2: 97% 98% 100%       Electronically Signed By: Ketan Mitchell MD  January 20, 2021  2:41 PM

## 2021-01-20 NOTE — OP NOTE
[facility] General Surgery Operative Note    Pre-operative diagnosis: Gallstone pancreatitis   Post-operative diagnosis: same   Procedure: Diagnostic laparoscopy  Biopsy peritoneal nodule   Surgeon: Marianna Sarabia MD   Assistant(s): Tiarra Singleton PA-C  The Physician Assistant was medically necessary for their expertise in prepping, camera management, suctioning, suturing and retraction.   Anesthesia: general   Estimated blood loss:  Specimen: 5 cc  Peritoneal nodule, ascites fluid for cytology               INDICATION FOR OPERATION: This is a 75 year old female was admitted 1/8-1/12/21 for presumed gallstone pancreatitis. She returned 1/13 and was readmitted for recurrent abdominal pain symptoms.  She had prior cardiac history, but history of hyperlipidemia (not on statin therapy), metastatic breast cancer to bone, GERD, and asthma.   Cardiology was consulted due to an elevated troponin. She underwent workup including a lexiscan stress test 1/19 without significant ischemia and preserved EF and cardiology deemed her okay for surgery.   Her pancreatitis has resolved and she has no abdominal pain currently.  We discussed laparoscopic cholecystectomy with cholangiograms and the patient agreed to proceed after hearing the risks and benefits.    DESCRIPTION OF PROCEDURE:  The patient was taken to the operating room and placed on the table in supine position.  General endotracheal anesthesia was induced and the abdomen was prepped and draped in standard sterile fashion.  An incision above the umbilicus was made with a blade.  The incision was carried down to the fascia. The fascia was elevated with kocher clamps and the fascia was incised in the midline with a blade.  The peritoneum was entered sharply.  0 Vicryl suture was placed at the extremes of the fascial incision.  Ascites fluid came up through the incision. The Sonia trocar was introduced and the abdomen was insufflated with CO2.   An adhesion of the  omentum to the anterior abdominal wall was taken down with cautery. It was densely adhered and some of the peritoneum and fascia ended up being excised with a small amount of exposed muscle. There were also multiple small white nodules seen on the peritoneum.  Copious green-yellow ascites fluid was seen and about 1L was suctioned from the abdomen    A 5 mm trocar was then placed in the subxiphoid position.  A 5 mm trocar was placed in the right upper quadrant, just below the costal margin at the midclavicular line.  Another was placed at the anterior axillary line just below the costal margin on the right.  The patient was placed in reverse Trendelenburg and right side up.  The gallbladder appeared mildly thickened and with copious dense adhesions of omentum.  The fundus of the gallbladder was grasped and retracted cephalad. Omental adhesions were taken down with cautery. There were further omental adhesions and likely the duodenum just behind these densely adhered to the infundibulum. This area was very indurated and we were unable to compress these adhesions down enough to see the entire infundibulum. It felt almost mass-like and this was completely covering where I would have expected to find the triangle of Calot.  At this juncture we decided to abort the cholecystectomy as proceeding could cause significant complication such as bile duct injury, duodenal injury, significant bleeding, may require open conversion. Due to the possible metastatic deposits and copious ascites fluid seen in the abdomen we elected to biopsy the area of omentum containing a thick nodule that we had taken off the abdominal wall for specimen, and this was done with ligasure device and placed in a specimen bag. We then removed about 10ml of ascites fluid from the abdomen and sent that for cytology. The gallbladder was visualized and was intact where we had used a grasper to lift it cephalad.    Each of the trocars was removed under direct  visualization.  There was no bleeding from any of these sites.  The Sonia trocar was removed and the abdomen was evacuated of CO2. Additional 0 Vicryl was placed in the fascia between the previously placed 0 vicryls and all were tied down to good effect.  The skin of the umbilical incision was anesthetized with local anesthetic.  All of the incisions were closed with interrupted 4-0 Vicryl subcuticular sutures and Steri-Strips.  The patient tolerated the procedure well.  Sponge and instrument counts were correct.      FINDINGS: cholecystectomy aborted due to dense adhesions near the triangle calot as we saw peritoneal studding, and copious amounts of ascites fluid - area of omentum biopied that was adhered to the anterior abdominal wall and fluid sent for cytology. With her history of metastatic breast cancer this could represent peritoneal mets.    Marianna Sarabia MD

## 2021-01-20 NOTE — PLAN OF CARE
"Pt presented with abdominal pain, diagnosed with pancreatitis, gallstones, elevated trops. Denied any chest pain or abdominal pain. Was nauseated, gave zofran. BP (!) 172/81 (BP Location: Left leg)   Pulse 78   Temp 97.7  F (36.5  C) (Oral)   Resp 20   Ht 1.575 m (5' 2\")   Wt 69.3 kg (152 lb 12.8 oz)   SpO2 97%   BMI 27.95 kg/m   Pt received radiation today. Midline saline locked. Tele SR inverted Ts. Pt NPO at midinight. Will continue with poc.  "

## 2021-01-20 NOTE — PROGRESS NOTES
Surgery Progress    Pt presented last week with gallstone pancreatitis. Had elevated troponins, and has undergone workup by cardiology. Yesterday had a Lexiscan stress test and based on those results does not require further optimization from a cardiology standpoint and have okay'd to proceed today.  She is otherwise feeling well, no abdominal pain, issues, tolerated clear liquids yesterday.    Discussed with patient megan felecia with grams.   Risks of surgery reviewed  We have discussed the surgery in detail, including benefits, alternatives, complications, incision, scar, infection, anesthesia, bleeding, DVT, lifting and activity limits after surgery.  All questions have been answered to the best of my ability.  Surgery today at 11:15am  Will plan to keep inpatient one night after surgery to ensure tolerance of diet, no development of cardiac concerns.     Marianna Sarabia MD

## 2021-01-21 LAB
ANION GAP SERPL CALCULATED.3IONS-SCNC: 4 MMOL/L (ref 3–14)
BASOPHILS # BLD AUTO: 0 10E9/L (ref 0–0.2)
BASOPHILS NFR BLD AUTO: 0 %
BUN SERPL-MCNC: 15 MG/DL (ref 7–30)
CALCIUM SERPL-MCNC: 7.6 MG/DL (ref 8.5–10.1)
CHLORIDE SERPL-SCNC: 111 MMOL/L (ref 94–109)
CO2 SERPL-SCNC: 25 MMOL/L (ref 20–32)
CREAT SERPL-MCNC: 1.09 MG/DL (ref 0.52–1.04)
DIFFERENTIAL METHOD BLD: ABNORMAL
EOSINOPHIL # BLD AUTO: 0 10E9/L (ref 0–0.7)
EOSINOPHIL NFR BLD AUTO: 0 %
ERYTHROCYTE [DISTWIDTH] IN BLOOD BY AUTOMATED COUNT: 15.9 % (ref 10–15)
GFR SERPL CREATININE-BSD FRML MDRD: 49 ML/MIN/{1.73_M2}
GLUCOSE BLDC GLUCOMTR-MCNC: 109 MG/DL (ref 70–99)
GLUCOSE BLDC GLUCOMTR-MCNC: 120 MG/DL (ref 70–99)
GLUCOSE BLDC GLUCOMTR-MCNC: 129 MG/DL (ref 70–99)
GLUCOSE BLDC GLUCOMTR-MCNC: 134 MG/DL (ref 70–99)
GLUCOSE BLDC GLUCOMTR-MCNC: 95 MG/DL (ref 70–99)
GLUCOSE SERPL-MCNC: 115 MG/DL (ref 70–99)
HCT VFR BLD AUTO: 24.7 % (ref 35–47)
HGB BLD-MCNC: 8.2 G/DL (ref 11.7–15.7)
IMM GRANULOCYTES # BLD: 0.1 10E9/L (ref 0–0.4)
IMM GRANULOCYTES NFR BLD: 0.9 %
LYMPHOCYTES # BLD AUTO: 0.4 10E9/L (ref 0.8–5.3)
LYMPHOCYTES NFR BLD AUTO: 6.4 %
MCH RBC QN AUTO: 36.6 PG (ref 26.5–33)
MCHC RBC AUTO-ENTMCNC: 33.2 G/DL (ref 31.5–36.5)
MCV RBC AUTO: 110 FL (ref 78–100)
MONOCYTES # BLD AUTO: 0.6 10E9/L (ref 0–1.3)
MONOCYTES NFR BLD AUTO: 9.9 %
NEUTROPHILS # BLD AUTO: 5.3 10E9/L (ref 1.6–8.3)
NEUTROPHILS NFR BLD AUTO: 82.8 %
NRBC # BLD AUTO: 0 10*3/UL
NRBC BLD AUTO-RTO: 0 /100
PLATELET # BLD AUTO: 313 10E9/L (ref 150–450)
POTASSIUM SERPL-SCNC: 3.8 MMOL/L (ref 3.4–5.3)
RBC # BLD AUTO: 2.24 10E12/L (ref 3.8–5.2)
SODIUM SERPL-SCNC: 140 MMOL/L (ref 133–144)
WBC # BLD AUTO: 6.4 10E9/L (ref 4–11)

## 2021-01-21 PROCEDURE — 120N000001 HC R&B MED SURG/OB

## 2021-01-21 PROCEDURE — 99232 SBSQ HOSP IP/OBS MODERATE 35: CPT | Performed by: INTERNAL MEDICINE

## 2021-01-21 PROCEDURE — 250N000013 HC RX MED GY IP 250 OP 250 PS 637: Performed by: SURGERY

## 2021-01-21 PROCEDURE — 999N000040 HC STATISTIC CONSULT NO CHARGE VASC ACCESS

## 2021-01-21 PROCEDURE — 85025 COMPLETE CBC W/AUTO DIFF WBC: CPT | Performed by: INTERNAL MEDICINE

## 2021-01-21 PROCEDURE — 250N000013 HC RX MED GY IP 250 OP 250 PS 637: Performed by: INTERNAL MEDICINE

## 2021-01-21 PROCEDURE — 250N000011 HC RX IP 250 OP 636: Performed by: SURGERY

## 2021-01-21 PROCEDURE — 80048 BASIC METABOLIC PNL TOTAL CA: CPT | Performed by: INTERNAL MEDICINE

## 2021-01-21 PROCEDURE — 99207 PR CDG-MDM COMPONENT: MEETS LOW - DOWN CODED: CPT | Performed by: INTERNAL MEDICINE

## 2021-01-21 PROCEDURE — 999N001017 HC STATISTIC GLUCOSE BY METER IP

## 2021-01-21 RX ORDER — NALOXONE HYDROCHLORIDE 0.4 MG/ML
0.4 INJECTION, SOLUTION INTRAMUSCULAR; INTRAVENOUS; SUBCUTANEOUS
Status: DISCONTINUED | OUTPATIENT
Start: 2021-01-21 | End: 2021-01-21

## 2021-01-21 RX ORDER — NALOXONE HYDROCHLORIDE 0.4 MG/ML
0.2 INJECTION, SOLUTION INTRAMUSCULAR; INTRAVENOUS; SUBCUTANEOUS
Status: DISCONTINUED | OUTPATIENT
Start: 2021-01-21 | End: 2021-01-21

## 2021-01-21 RX ORDER — GABAPENTIN 300 MG/1
300 CAPSULE ORAL 2 TIMES DAILY PRN
Status: DISCONTINUED | OUTPATIENT
Start: 2021-01-21 | End: 2021-01-22 | Stop reason: HOSPADM

## 2021-01-21 RX ORDER — SODIUM CHLORIDE, SODIUM LACTATE, POTASSIUM CHLORIDE, CALCIUM CHLORIDE 600; 310; 30; 20 MG/100ML; MG/100ML; MG/100ML; MG/100ML
INJECTION, SOLUTION INTRAVENOUS CONTINUOUS
Status: DISCONTINUED | OUTPATIENT
Start: 2021-01-21 | End: 2021-01-22 | Stop reason: HOSPADM

## 2021-01-21 RX ADMIN — DEXTROMETHORPHAN POLISTIREX 30 MG: 30 SUSPENSION ORAL at 09:01

## 2021-01-21 RX ADMIN — ACETAMINOPHEN 650 MG: 325 TABLET, FILM COATED ORAL at 20:15

## 2021-01-21 RX ADMIN — GABAPENTIN 300 MG: 300 CAPSULE ORAL at 11:01

## 2021-01-21 RX ADMIN — METOPROLOL TARTRATE 25 MG: 25 TABLET, FILM COATED ORAL at 20:15

## 2021-01-21 RX ADMIN — OMEPRAZOLE 20 MG: 20 CAPSULE, DELAYED RELEASE ORAL at 09:59

## 2021-01-21 RX ADMIN — ONDANSETRON 4 MG: 4 TABLET, ORALLY DISINTEGRATING ORAL at 09:22

## 2021-01-21 RX ADMIN — ATORVASTATIN CALCIUM 40 MG: 40 TABLET, FILM COATED ORAL at 20:15

## 2021-01-21 RX ADMIN — ENOXAPARIN SODIUM 40 MG: 40 INJECTION SUBCUTANEOUS at 09:01

## 2021-01-21 RX ADMIN — METOPROLOL TARTRATE 25 MG: 25 TABLET, FILM COATED ORAL at 09:01

## 2021-01-21 RX ADMIN — ACETAMINOPHEN 650 MG: 325 TABLET, FILM COATED ORAL at 10:08

## 2021-01-21 RX ADMIN — HYDROMORPHONE HYDROCHLORIDE 0.2 MG: 1 INJECTION, SOLUTION INTRAMUSCULAR; INTRAVENOUS; SUBCUTANEOUS at 21:48

## 2021-01-21 RX ADMIN — DEXTROMETHORPHAN POLISTIREX 30 MG: 30 SUSPENSION ORAL at 21:48

## 2021-01-21 RX ADMIN — ASPIRIN 81 MG: 81 TABLET, COATED ORAL at 09:01

## 2021-01-21 RX ADMIN — GABAPENTIN 900 MG: 300 CAPSULE ORAL at 21:48

## 2021-01-21 RX ADMIN — HYDROMORPHONE HYDROCHLORIDE 0.2 MG: 1 INJECTION, SOLUTION INTRAMUSCULAR; INTRAVENOUS; SUBCUTANEOUS at 10:52

## 2021-01-21 RX ADMIN — MULTIPLE VITAMINS W/ MINERALS TAB 1 TABLET: TAB at 09:01

## 2021-01-21 ASSESSMENT — ACTIVITIES OF DAILY LIVING (ADL)
ADLS_ACUITY_SCORE: 17
ADLS_ACUITY_SCORE: 18
ADLS_ACUITY_SCORE: 17
ADLS_ACUITY_SCORE: 18

## 2021-01-21 ASSESSMENT — MIFFLIN-ST. JEOR: SCORE: 1138.17

## 2021-01-21 NOTE — PLAN OF CARE
"Patient admitted 1/13/21 for abdo pain. Cardiology and surgery are following. Surgery today\" aborted\". See surgery note. Patient has had a very poor appetite with minimal PO intake. She has been up to the bathroom with stand by since return to the floor. She is confused. No discharge plans noted at this time. Cardiology plan needed. Patient plans on returning home.   "

## 2021-01-21 NOTE — PLAN OF CARE
1900 to 0730 RN:    Pt. A&Ox4, up with SBA, Tele: SR with inverted T waves and prolong QT. Lung sounds diminished, O2 at 2L with sat's at 99%. Midline to left arm intact with good blood return. Pt. Tolerating clear liquid diet (ADAT) though has poor appetite. BGL at 6075=480. Tylenol given for c/o generalized discomfort with relief. X4 lap sites on abdomen, Clean, dry and intact. Pt. Denies any needs at this time. Will continue with POC.

## 2021-01-21 NOTE — PROGRESS NOTES
SPIRITUAL HEALTH SERVICES Progress Note  [facility] 1/21/21    Saw pt, Mariana, per consult with clinical coordinator. Mariana had been in hospital 8 days and [facility] wanted to check in on her. She did not wish to talk with a  and so I let her know that if she does later, to let the nurse know and they will send along the request.    No further plans and chaplains remain available upon request.       Mikel Jackman   Intern

## 2021-01-21 NOTE — PLAN OF CARE
"A/O x 4. VSS on RA except elevated BP . C/O right abd pain and HA; PRN PO tylenol 650 mg and IV dilaudid 0.2 mg given per pt request w/ relief. BG 95 and 129. Tele SR w/ prolong QT, denied CP (discontinued today). LS clear/dim, denied SOB. Midline to LUE intact w/ blood return noted, SL . Dsgs to abd CDI. Up SBA GB, voiding. Poor PO intake clear liq, pt states \"I just don't have any appetite.\" PRN SL zofran given per pt request this AM for nausea, improved. PRN PO gabapentin given per pt request for restless legs. Possible discharge 1-2 days once able to ADAT w/ adequate pain management. Will continue POC.     Addendum 1507: Pt expressing interest in advancing diet. Advanced to full liquid.   "

## 2021-01-21 NOTE — PROGRESS NOTES
[facility]    Surgery  Daily Post-Op Note    Assessment & Plan   Procedure(s):  LAPAROSCOpy diagnostic, peritoneal biopsy  -1 Day Post-Op  Unable to complete cholecystectomy due to dense inflammation surrounding the infundibulum and triangle of calot/diana hepatis - likely would have required open conversion with potential for significant bleeding, bowel, vascular or biliary injury. There was no significant gallbladder inflammation to suggest cholecystitis intraop and additionally seen peritoneal studding and ascites (1L removed) suggest possible peritoneal metastasis - biopsy and cytology fluid sent.    Reviewing her entire course I question if this was truly gallstone pancreatitis or another process such as a sealed perforated ulcer given the degree of inflammatory mass at the duodenum. Her lipase was minimally elevated and LFTs were normal through the course.    Plan:  -Ambulate  -Advance activity as tolerated  -Advance diet as tolerated  -Routine wound care  -follow up pathology results  -start omeprazole  -discussed with patient would treat gallbladder conservatively going forward. At this point I feel it would be unsafe to attempt repeat cholecystectomy  -discharge per hospitalist.    Marianna Sarabia MD      Interval History   Feels okay this morning. Pain near midline incision site. Tolerated clears. Not hungry  Wondering if she can discharge today. Discussed she would need to tolerate diet and demonstrate ambulation    Physical Exam   Temp: 97.6  F (36.4  C) Temp src: Oral BP: (!) 155/72 Pulse: 76   Resp: 18 SpO2: 95 % O2 Device: Nasal cannula Oxygen Delivery: 2 LPM  Vitals:    01/19/21 0549 01/20/21 0602 01/21/21 0656   Weight: 69.3 kg (152 lb 12.8 oz) 68.6 kg (151 lb 4.8 oz) 69 kg (152 lb 1.6 oz)     Vital Signs with Ranges  Temp:  [96.6  F (35.9  C)-97.8  F (36.6  C)] 97.6  F (36.4  C)  Pulse:  [57-79] 76  Resp:  [8-30] 18  BP: ()/(49-75) 155/72  SpO2:  [90 %-100 %] 95 %  I/O  last 3 completed shifts:  In: 1375 [P.O.:75; I.V.:1300]  Out: -     Dressings dry and intact.  Abdomen mildly distended, soft, tender near umbilical incision    Medications     lactated ringers       - MEDICATION INSTRUCTIONS -          aspirin  81 mg Oral Daily     atorvastatin  40 mg Oral QPM     dextromethorphan  30 mg Oral Q12H NANCY     enoxaparin ANTICOAGULANT  40 mg Subcutaneous Q24H     gabapentin  900 mg Oral At Bedtime     metoprolol tartrate  25 mg Oral BID     multivitamin w/minerals  1 tablet Oral Daily     sodium chloride (PF)  10 mL Intracatheter Q8H     sodium chloride (PF)  3 mL Intracatheter Q8H       Data   Results for orders placed or performed during the hospital encounter of 01/13/21 (from the past 24 hour(s))   Glucose by meter   Result Value Ref Range    Glucose 93 70 - 99 mg/dL   Glucose by meter   Result Value Ref Range    Glucose 109 (H) 70 - 99 mg/dL   Basic metabolic panel   Result Value Ref Range    Sodium 140 133 - 144 mmol/L    Potassium 3.8 3.4 - 5.3 mmol/L    Chloride 111 (H) 94 - 109 mmol/L    Carbon Dioxide 25 20 - 32 mmol/L    Anion Gap 4 3 - 14 mmol/L    Glucose 115 (H) 70 - 99 mg/dL    Urea Nitrogen 15 7 - 30 mg/dL    Creatinine 1.09 (H) 0.52 - 1.04 mg/dL    GFR Estimate 49 (L) >60 mL/min/[1.73_m2]    GFR Estimate If Black 57 (L) >60 mL/min/[1.73_m2]    Calcium 7.6 (L) 8.5 - 10.1 mg/dL   CBC with platelets differential   Result Value Ref Range    WBC 6.4 4.0 - 11.0 10e9/L    RBC Count 2.24 (L) 3.8 - 5.2 10e12/L    Hemoglobin 8.2 (L) 11.7 - 15.7 g/dL    Hematocrit 24.7 (L) 35.0 - 47.0 %     (H) 78 - 100 fl    MCH 36.6 (H) 26.5 - 33.0 pg    MCHC 33.2 31.5 - 36.5 g/dL    RDW 15.9 (H) 10.0 - 15.0 %    Platelet Count 313 150 - 450 10e9/L    Diff Method Automated Method     % Neutrophils 82.8 %    % Lymphocytes 6.4 %    % Monocytes 9.9 %    % Eosinophils 0.0 %    % Basophils 0.0 %    % Immature Granulocytes 0.9 %    Nucleated RBCs 0 0 /100    Absolute Neutrophil 5.3 1.6 - 8.3  10e9/L    Absolute Lymphocytes 0.4 (L) 0.8 - 5.3 10e9/L    Absolute Monocytes 0.6 0.0 - 1.3 10e9/L    Absolute Eosinophils 0.0 0.0 - 0.7 10e9/L    Absolute Basophils 0.0 0.0 - 0.2 10e9/L    Abs Immature Granulocytes 0.1 0 - 0.4 10e9/L    Absolute Nucleated RBC 0.0    Glucose by meter   Result Value Ref Range    Glucose 95 70 - 99 mg/dL

## 2021-01-21 NOTE — PROVIDER NOTIFICATION
"MD paged: \"Pt c/o restless legs, says she takes gabapentin @ home @ night, but is wondering if she can have PRN now? Thanks\"  "

## 2021-01-21 NOTE — PROGRESS NOTES
[facility]    Medicine Progress Note - Hospitalist Service    Length of stay: 8 days    Assessment & Plan   Lisa Galloway is a 75 year old female with a history of hyperlipidemia, breast cancer with widespread osseous mets, recently diagnosed pancreatitis, GERD, and asthma. She was admitted on 1/13/2021 after presenting with recurrent abdominal pain.     Patient was admitted to this hospital from 1/8 through 1/12.  At that time, she was presenting with abdominal pain.  CT of the abdomen was done at outside institution showing inflammation around the pancreatic head and descending duodenum concerning for acute pancreatitis, as well as distal esophageal thickening. Lipase was elevated to 1200. She was found to have cholelithiasis.  She denied alcohol use and had normal triglycerides.  GI was consulted during the hospitalization.  Overall, it was felt to be most likely related to gallstone pancreatitis.  She was discharged to home.      She returned here on 1/13 complaining of recurrent upper abdominal pain.  Lipase had trended down to 747.  Troponin was elevated to 2.8.  Echocardiogram showed EF of 35 to 40% with distal, anterolateral and inferior wall hypokinesis somewhat suggestive of stress cardiomyopathy.  Nuclear stress test showed a medium size infarct in the LAD distribution.  This was felt to be an old infarct by cardiology and they recommended medical management.  At that point, patient was felt to be appropriate for cholecystectomy, which was indicated for her episode of apparent gallstone pancreatitis.    She was taken to the OR on 1/20 for laparoscopic cholecystectomy.  The cholecystectomy component of the procedure was aborted due to dense adhesions near the triangle of Calot as it was noted that she had peritoneal studding and copious amounts of ascites.  This was highly concerning for metastatic breast cancer.  Biopsies were taken and 1 L of ascites was removed.  Per general surgery,  the gallbladder did not appear significantly inflamed and it was not felt necessary to remove it nor to place cholecystostomy tube.    At this point, patient needs to remain hospitalized primarily to advance her diet.  She continues to complain of epigastric abdominal discomfort.  Encouraging use of pain medication to treat that to hopefully optimize her p.o. intake to get her out of the hospital to follow-up with her own oncologist in the outpatient setting.      Elevated troponin/NSTEMI with newly dx CM  - Troponin elevated to a peak of 2.8  - suspected Type 2 MI/demand ischemia   - TTE 1/13 showing LVEF 35-40% with distal anterolateral wall and inferior wall hypokinesis and dyskinesis of the apex consistent with a possible stress cardiomyopathy.   - Repeat limited echocardiogram 1/17 appears similar with LVEF 35-40% with WMAs unchanged.  - Lexiscan on 1/19 shows previous infarction in LAD distribution with only mild mendel-infarct ischemia and cardiology OK with surgery if small to medium amount of ischemia present.  Pt remains asymptomatic from a cardiac perspective     Epigastric abdominal pain  - As above, this was initially thought to be due to gallstone pancreatitis but the etiology seems to have been ruled out.  CT scan on 1/8 showed inflammation around the pancreas as well as thickening of distal esophagus.  - Pain persists here.  Lipase was not significantly elevated.  - Could be related to direct metastatic involvement.  - Treating symptomatically for now  - biopsies of omental lesions and cytology are pending.  - If any worsening pain would have low threshold for CT abdomen/pelvis with IV contrast, and plan to order this tomorrow if she is no better.     History of metastatic breast cancer  - Patient has breast cancer with widespread osseous metastasis, ER/OK positive, HER-2/tejal negative.  Receives monthly Faslodex as well as ribociclib.  Goals of treatment are palliative.    - Needs close outpatient  "follow up with her oncologist    History of bronchial asthma   - not in exacerbation     Macrocytic anemia  - Hb stable in the 8 range   - work up unremarkable including ferritin, TIBC, B12, folate levels.  No e/o iron deficiency  - patient has widespread osseous mets from breast cancer which is likely the cause     Chronic kidney disease with baseline creatinine 1.2-1.3  - Noted, creatinine has been stable here. Monitor periodically.  - Patient has R ureteral stent in place      Prolonged QT interval  - Only potential culprit medication is Aricept which has been stopped per cardiology recommendation    Severe malnutrition  - Appreciate RD assistance  - Encourage PO intake    Diet: Clears; ADAT  DVT Prophylaxis: Enoxaparin (Lovenox) SQ  Yo Catheter: No  Code Status: No CPR- Do NOT Intubate     Disposition Plan   Expected discharge:   1-2 more days if abdominal pain improves and patient is able to take PO.    Piter Hua MD  Hospitalist Service  [facility]  ______________________________________________________________________    Interval History   Complaining of some upper abdominal pain today.  The pain makes her not feel like eating.    Data reviewed today: I reviewed all medications, new labs and imaging results over the last 24 hours.     Physical Exam   BP (!) 179/76 (BP Location: Left leg)   Pulse 64   Temp 97.6  F (36.4  C) (Oral)   Resp 18   Ht 1.575 m (5' 2\")   Wt 69 kg (152 lb 1.6 oz)   SpO2 94%   BMI 27.82 kg/m    152 lbs 1.6 oz       General: Appears fatigued.    HEENT: No scleral icterus. Oropharynx moist.     Neck: Supple. Normal range of motion.     Pulmonary: Normal work of breathing. Clear to auscultation bilaterally.    Cardiovascular: Regular rate and rhythm without murmur or extra heart sounds.    Abdomen: Soft, mild to moderate epigastric tenderness to palpation.    Extremities: No peripheral edema. No clubbing or cyanosis.     Neurologic: Awake, alert, " appropriate.    Skin: Warm and dry.    Psychiatric: Normal affect and mood.     Data    Recent Labs   Lab 01/21/21  0628 01/19/21  1230 01/18/21  0740 01/16/21  0452 01/16/21  0452 01/14/21  1214 01/14/21  1214   WBC 6.4  --  5.7  --  4.9   < >  --    HGB 8.2*  --  10.1*  --  8.8*   < >  --    *  --  105*  --  110*   < >  --     340 316  --  261   < >  --    INR  --   --   --   --  1.14  --   --     141  --   --  143   < >  --    POTASSIUM 3.8 3.7 4.4   < > 3.4   < >  --    CHLORIDE 111* 112*  --   --  115*   < >  --    CO2 25 25  --   --  26   < >  --    BUN 15 12  --   --  10   < >  --    CR 1.09* 1.17*  --   --  0.93   < >  --    ANIONGAP 4 4  --   --  2*   < >  --    EARL 7.6* 7.8*  --   --  7.5*   < >  --    * 96  --   --  124*   < >  --    ALBUMIN  --  2.2*  --   --   --   --   --    PROTTOTAL  --  5.3*  --   --   --   --   --    BILITOTAL  --  0.5  --   --   --   --   --    ALKPHOS  --  59  --   --   --   --   --    ALT  --  32  --   --   --   --   --    AST  --  41  --   --   --   --   --    TROPI  --   --   --   --   --   --  1.202*    < > = values in this interval not displayed.     No results found for this or any previous visit (from the past 24 hour(s)).    Medications      Current Facility-Administered Medications   Medication Dose Route Frequency     aspirin  81 mg Oral Daily     atorvastatin  40 mg Oral QPM     dextromethorphan  30 mg Oral Q12H NANCY     enoxaparin ANTICOAGULANT  40 mg Subcutaneous Q24H     gabapentin  900 mg Oral At Bedtime     metoprolol tartrate  25 mg Oral BID     multivitamin w/minerals  1 tablet Oral Daily     omeprazole  20 mg Oral QAM AC     sodium chloride (PF)  10 mL Intracatheter Q8H     sodium chloride (PF)  3 mL Intracatheter Q8H

## 2021-01-22 VITALS
BODY MASS INDEX: 27.97 KG/M2 | TEMPERATURE: 98.3 F | DIASTOLIC BLOOD PRESSURE: 66 MMHG | HEART RATE: 62 BPM | WEIGHT: 152 LBS | OXYGEN SATURATION: 94 % | RESPIRATION RATE: 18 BRPM | SYSTOLIC BLOOD PRESSURE: 139 MMHG | HEIGHT: 62 IN

## 2021-01-22 LAB
ANION GAP SERPL CALCULATED.3IONS-SCNC: 2 MMOL/L (ref 3–14)
BUN SERPL-MCNC: 16 MG/DL (ref 7–30)
CALCIUM SERPL-MCNC: 7.4 MG/DL (ref 8.5–10.1)
CHLORIDE SERPL-SCNC: 110 MMOL/L (ref 94–109)
CO2 SERPL-SCNC: 26 MMOL/L (ref 20–32)
COPATH REPORT: NORMAL
CREAT SERPL-MCNC: 1.1 MG/DL (ref 0.52–1.04)
ERYTHROCYTE [DISTWIDTH] IN BLOOD BY AUTOMATED COUNT: 15.9 % (ref 10–15)
GFR SERPL CREATININE-BSD FRML MDRD: 49 ML/MIN/{1.73_M2}
GLUCOSE BLDC GLUCOMTR-MCNC: 106 MG/DL (ref 70–99)
GLUCOSE SERPL-MCNC: 91 MG/DL (ref 70–99)
HCT VFR BLD AUTO: 24.2 % (ref 35–47)
HGB BLD-MCNC: 8.1 G/DL (ref 11.7–15.7)
MCH RBC QN AUTO: 36.7 PG (ref 26.5–33)
MCHC RBC AUTO-ENTMCNC: 33.5 G/DL (ref 31.5–36.5)
MCV RBC AUTO: 110 FL (ref 78–100)
PLATELET # BLD AUTO: 332 10E9/L (ref 150–450)
POTASSIUM SERPL-SCNC: 3.4 MMOL/L (ref 3.4–5.3)
POTASSIUM SERPL-SCNC: 3.8 MMOL/L (ref 3.4–5.3)
RBC # BLD AUTO: 2.21 10E12/L (ref 3.8–5.2)
SODIUM SERPL-SCNC: 138 MMOL/L (ref 133–144)
WBC # BLD AUTO: 6.7 10E9/L (ref 4–11)

## 2021-01-22 PROCEDURE — 99207 PR CDG-CODE CATEGORY CHANGED: CPT | Performed by: INTERNAL MEDICINE

## 2021-01-22 PROCEDURE — 250N000011 HC RX IP 250 OP 636: Performed by: SURGERY

## 2021-01-22 PROCEDURE — 99232 SBSQ HOSP IP/OBS MODERATE 35: CPT | Performed by: INTERNAL MEDICINE

## 2021-01-22 PROCEDURE — 80048 BASIC METABOLIC PNL TOTAL CA: CPT | Performed by: INTERNAL MEDICINE

## 2021-01-22 PROCEDURE — 250N000013 HC RX MED GY IP 250 OP 250 PS 637: Performed by: SURGERY

## 2021-01-22 PROCEDURE — 85027 COMPLETE CBC AUTOMATED: CPT | Performed by: INTERNAL MEDICINE

## 2021-01-22 PROCEDURE — 999N001017 HC STATISTIC GLUCOSE BY METER IP

## 2021-01-22 PROCEDURE — 36415 COLL VENOUS BLD VENIPUNCTURE: CPT | Performed by: INTERNAL MEDICINE

## 2021-01-22 PROCEDURE — 84132 ASSAY OF SERUM POTASSIUM: CPT | Performed by: INTERNAL MEDICINE

## 2021-01-22 PROCEDURE — 250N000013 HC RX MED GY IP 250 OP 250 PS 637: Performed by: INTERNAL MEDICINE

## 2021-01-22 RX ORDER — HYDROMORPHONE HYDROCHLORIDE 2 MG/1
1-2 TABLET ORAL EVERY 4 HOURS PRN
Qty: 15 TABLET | Refills: 0 | Status: ON HOLD | OUTPATIENT
Start: 2021-01-22 | End: 2021-01-31

## 2021-01-22 RX ORDER — METOPROLOL TARTRATE 25 MG/1
25 TABLET, FILM COATED ORAL 2 TIMES DAILY
Qty: 60 TABLET | Refills: 0 | Status: SHIPPED | OUTPATIENT
Start: 2021-01-22

## 2021-01-22 RX ORDER — ATORVASTATIN CALCIUM 40 MG/1
40 TABLET, FILM COATED ORAL EVERY EVENING
Qty: 30 TABLET | Refills: 0 | Status: SHIPPED | OUTPATIENT
Start: 2021-01-22

## 2021-01-22 RX ORDER — HYDROMORPHONE HYDROCHLORIDE 2 MG/1
2 TABLET ORAL
Status: DISCONTINUED | OUTPATIENT
Start: 2021-01-22 | End: 2021-01-22 | Stop reason: HOSPADM

## 2021-01-22 RX ORDER — ONDANSETRON 4 MG/1
4 TABLET, ORALLY DISINTEGRATING ORAL EVERY 6 HOURS PRN
Qty: 30 TABLET | Refills: 0 | Status: SHIPPED | OUTPATIENT
Start: 2021-01-22

## 2021-01-22 RX ORDER — POTASSIUM CHLORIDE 1500 MG/1
40 TABLET, EXTENDED RELEASE ORAL ONCE
Status: COMPLETED | OUTPATIENT
Start: 2021-01-22 | End: 2021-01-22

## 2021-01-22 RX ADMIN — POTASSIUM CHLORIDE 40 MEQ: 1500 TABLET, EXTENDED RELEASE ORAL at 08:36

## 2021-01-22 RX ADMIN — MULTIPLE VITAMINS W/ MINERALS TAB 1 TABLET: TAB at 09:38

## 2021-01-22 RX ADMIN — ACETAMINOPHEN 650 MG: 325 TABLET, FILM COATED ORAL at 00:43

## 2021-01-22 RX ADMIN — METOPROLOL TARTRATE 25 MG: 25 TABLET, FILM COATED ORAL at 08:36

## 2021-01-22 RX ADMIN — ENOXAPARIN SODIUM 40 MG: 40 INJECTION SUBCUTANEOUS at 08:29

## 2021-01-22 RX ADMIN — ASPIRIN 81 MG: 81 TABLET, COATED ORAL at 08:36

## 2021-01-22 RX ADMIN — DEXTROMETHORPHAN POLISTIREX 30 MG: 30 SUSPENSION ORAL at 08:36

## 2021-01-22 RX ADMIN — OMEPRAZOLE 20 MG: 20 CAPSULE, DELAYED RELEASE ORAL at 06:58

## 2021-01-22 ASSESSMENT — ACTIVITIES OF DAILY LIVING (ADL)
ADLS_ACUITY_SCORE: 17

## 2021-01-22 ASSESSMENT — MIFFLIN-ST. JEOR: SCORE: 1137.72

## 2021-01-22 NOTE — DISCHARGE SUMMARY
[facility]  Hospitalist Discharge Summary       Date of Admission:  1/13/2021  Date of Discharge:  1/22/2021  Discharging Provider: Piter Hua MD      Discharge Diagnoses   NSTEMI type I  Epigastric abdominal pain, multifactorial  Metastatic breast cancer  New diagnosis of peritoneal carcinomatosis  Chronic macrocytic anemia  Severe malnutrition    Follow-ups Needed After Discharge   Follow-up Appointments     Follow-up and recommended labs and tests       Follow-up with cardiology in 1 month and with oncologist as directed.               Discharge Disposition   Discharged to home  Condition at discharge: Stable    Hospital Course   Lisa Galloway is a 75 year old female with a history of hyperlipidemia, breast cancer with widespread osseous mets, recently diagnosed pancreatitis, GERD, and asthma. She was admitted on 1/13/2021 after presenting with recurrent abdominal pain.      Patient was admitted to this hospital from 1/8 through 1/12.  At that time, she was presenting with abdominal pain.  CT of the abdomen was done at outside institution showing inflammation around the pancreatic head and descending duodenum concerning for acute pancreatitis, as well as distal esophageal thickening. Lipase was elevated to 1200. She was found to have cholelithiasis.  She denied alcohol use and had normal triglycerides.  GI was consulted during the hospitalization.  Overall, it was felt to be most likely related to gallstone pancreatitis.  She was discharged to home.       She returned here on 1/13 complaining of recurrent upper abdominal pain.  Lipase had trended down to 747.  Troponin was elevated to 2.8.  Echocardiogram showed EF of 35 to 40% with distal, anterolateral and inferior wall hypokinesis somewhat suggestive of stress cardiomyopathy.  Nuclear stress test showed a medium size infarct in the LAD distribution.  This was felt to be an old infarct by cardiology and they recommended  medical management.  At that point, patient was felt to be appropriate for cholecystectomy, which was indicated for her episode of apparent gallstone pancreatitis.     She was taken to the OR on 1/20 for laparoscopic cholecystectomy.  The cholecystectomy component of the procedure was aborted due to dense adhesions near the triangle of Calot as it was noted that she had peritoneal studding and copious amounts of ascites.  This was highly concerning for metastatic breast cancer.  Biopsies were taken and 1 L of ascites was removed.  Per general surgery, the gallbladder did not appear significantly inflamed and it was not felt necessary to remove it nor to place cholecystostomy tube.    Patient remained hospitalized for 2 days after the procedure to monitor diet and ambulation.  Pathology returned on 1/22 confirming that the breast cancer metastasized to the abdomen.  This finding was discussed with the patient and her .  I also relayed the information to the patient's primary oncologist.  She will follow up with them next week for discussion of further treatment.  Because the patient had issues with ongoing abdominal pain and low appetite, she was prescribed Dilaudid and Zofran to use at home as needed.    In regards to the patient's MI, she was managed medically.  She was started on aspirin, atorvastatin, metoprolol during the hospitalization.  Prescriptions were written for those on discharge.  She also needs to follow-up with cardiology in 1 month.     Consultations This Hospital Stay   PHARMACY IP CONSULT  PHARMACY IP CONSULT  CARDIOLOGY IP CONSULT  PHARMACY IP CONSULT  PHARMACY IP CONSULT  SURGERY GENERAL IP CONSULT  VASCULAR ACCESS ADULT IP CONSULT  VASCULAR ACCESS ADULT IP CONSULT  SURGERY GENERAL IP CONSULT      Code Status   No CPR- Do NOT Intubate    Time Spent on this Encounter   IPiter MD, personally saw the patient today and spent greater than 30 minutes discharging this  patient.       Piter Hua MD  [facility]  ______________________________________________________________________    Physical Exam   Vital Signs: Temp: 98.3  F (36.8  C) Temp src: Oral BP: 139/66 Pulse: 62   Resp: 18 SpO2: 94 % O2 Device: None (Room air)    Weight: 152 lbs 0 oz    General: No acute distress  HEENT: No scleral icterus. Oropharynx moist.   Neck: Supple. Normal range of motion.  Pulmonary: Normal work of breathing. Clear to auscultation bilaterally.  Cardiovascular: Regular rate and rhythm without murmur or extra heart sounds.  Abdomen: Soft, mild generalized tenderness.  Extremities: No peripheral edema. No clubbing.  Neurologic: Awake, alert, appropriate.  Skin: Warm and dry.  Psychiatric: Normal affect and mood.       Primary Care Physician   Nichole Lira    Discharge Orders      Follow-Up with Cardiologist      Reason for your hospital stay    Initially, we were worried about your heart.  We found evidence of previous heart attack.  We started you on new medicines.  You will need to follow-up with cardiology in 1 month.  We then went and looked at the gallbladder, unfortunately found that the breast cancer appeared to be metastatic to that area.  This was discussed with your oncologist.  You will need to follow-up with her in the clinic.     Follow-up and recommended labs and tests     Follow-up with cardiology in 1 month and with oncologist as directed.     Activity    Your activity upon discharge: As tolerated     Diet    Follow this diet upon discharge: Regular       Significant Results and Procedures   Most Recent 3 CBC's:  Recent Labs   Lab Test 01/22/21  0712 01/21/21  0628 01/19/21  1230 01/18/21  0740   WBC 6.7 6.4  --  5.7   HGB 8.1* 8.2*  --  10.1*   * 110*  --  105*    313 340 316     Most Recent 3 BMP's:  Recent Labs   Lab Test 01/22/21  1318 01/22/21  0712 01/21/21  0628 01/19/21  1230   NA  --  138 140 141   POTASSIUM 3.8 3.4 3.8 3.7    CHLORIDE  --  110* 111* 112*   CO2  --  26 25 25   BUN  --  16 15 12   CR  --  1.10* 1.09* 1.17*   ANIONGAP  --  2* 4 4   EARL  --  7.4* 7.6* 7.8*   GLC  --  91 115* 96     Most Recent 2 LFT's:  Recent Labs   Lab Test 01/19/21  1230 01/13/21 2037   AST 41 31   ALT 32 12   ALKPHOS 59 57   BILITOTAL 0.5 0.5     Most Recent 3 INR's:  Recent Labs   Lab Test 01/16/21  0452   INR 1.14     Most Recent INR's and Anticoagulation Dosing History:  Anticoagulation Dose History       Recent Dosing and Labs Latest Ref Rng & Units 1/16/2021    INR 0.86 - 1.14 1.14        ,   Results for orders placed or performed during the hospital encounter of 01/13/21   US Abdomen Limited    Narrative    EXAM: US ABDOMEN LIMITED  LOCATION: [facility]  DATE/TIME: 1/13/2021 10:00 PM    INDICATION: Right-sided abdominal pain. History of pancreatitis.  COMPARISON: CT abdomen and pelvis 01/08/2021  TECHNIQUE: Limited abdominal ultrasound.    FINDINGS:    GALLBLADDER: Small amount of sludge and tiny stones in the gallbladder. Wall thickness upper range of normal measuring 3 mm. Ascites in the right upper quadrant adjacent to the gallbladder.    BILE DUCTS: No biliary dilatation. The common duct measures 5 mm.    LIVER: Normal parenchyma with smooth contour. No focal mass.    RIGHT KIDNEY: Moderate right hydronephrosis    PANCREAS: Dilated pancreatic duct measuring 5 mm.    No ascites.      Impression    IMPRESSION:  1.  Small amount of sludge and stone material in the gallbladder. Wall thickness upper range of normal. Findings equivocal for acute cholecystitis.  2.  Ascites right upper and right lower quadrants.  3.  Slight dilatation of the pancreatic duct as seen on CT.  4.  Right hydronephrosis. This also was present on CT with ureteral stent in place.   Echocardiogram Complete    Narrative    [id number]  [id number]  [id number]  [id number]^CIRA^AL^KEIRA           [facility]  Echocardiography Laboratory  [address]  [place] [address]        Name: NATALIE LIRA  MRN: [id number]  : 1945  Study Date: 2021 01:30 PM  Age: 75 yrs  Gender: Female  Patient Location: [facility]  Reason For Study: Myocardial Infarction  Ordering Physician: SALEEM DENIS  Referring Physician: Nichole Lira  Performed By: Carrie Rose RDCS     BSA: 1.6 m2  Height: 62 in  Weight: 141 lb  HR: 71  BP: 110/72 mmHg  _____________________________________________________________________________  __        Procedure  Complete Portable Echo Adult. Optison (NDC #[id number]) given intravenously.  _____________________________________________________________________________  __        Interpretation Summary     There is apical dyskinesis.  Left ventricular systolic function is moderately reduced.  The visual ejection fraction is estimated at 35-40%.  The left atrium is mildly dilated.  Right ventricular systolic pressure is elevated, consistent with mild to  moderate pulmonary hypertension.  Doppler interrogation does not demonstrate signficant stenois or insufficiency  involving cardiac valves.     The base is hyperdynamic while the distal anterolateral wall and inferior wall  are hypokinetic and the apex is dyskinetic. The distribution of the wall  motion abnormalities is highly suggestive of \"Takotsubo cardiomyopathy\"  _____________________________________________________________________________  __        Left Ventricle  The left ventricle is normal in size. There is normal left ventricular wall  thickness. Left ventricular systolic function is moderately reduced. The  visual ejection fraction is estimated at 35-40%. Grade I or early diastolic  dysfunction. There is apical dyskinesis. There is no thrombus seen in the left  ventricle.     Right Ventricle  The right ventricle is normal in structure, function and size. There is no  mass or thrombus in the right ventricle.     Atria  The left atrium is mildly dilated. Right atrial size is " normal. There is no  atrial shunt seen. The left atrial appendage is not well visualized.     Mitral Valve  There is mild to moderate mitral annular calcification. There is no mitral  regurgitation noted. There is no mitral valve stenosis.        Tricuspid Valve  Normal tricuspid valve. The right ventricular systolic pressure is  approximated at 36.4 mmHg plus the right atrial pressure. Right ventricular  systolic pressure is elevated, consistent with mild to moderate pulmonary  hypertension. There is no tricuspid stenosis.     Aortic Valve  The aortic valve is trileaflet. No aortic regurgitation is present. No aortic  stenosis is present.     Pulmonic Valve  Normal pulmonic valve. There is no pulmonic valvular regurgitation. There is  no pulmonic valvular stenosis.     Vessels  The aortic root is normal size. Normal size ascending aorta. The inferior vena  cava is normal. The pulmonary artery is normal size.     Pericardium  The pericardium appears normal. There is no pleural effusion.        Rhythm  Sinus rhythm was noted.  _____________________________________________________________________________  __  MMode/2D Measurements & Calculations  IVSd: 1.0 cm     LVIDd: 4.5 cm  LVIDs: 2.7 cm  LVPWd: 1.1 cm  FS: 38.8 %  LV mass(C)d: 167.7 grams  LV mass(C)dI: 101.8 grams/m2  Ao root diam: 3.0 cm  LA dimension: 3.7 cm  asc Aorta Diam: 3.7 cm  LA/Ao: 1.2  LVOT diam: 2.0 cm  LVOT area: 3.0 cm2  LA Volume (BP): 64.0 ml  LA Volume Index (BP): 38.8 ml/m2  RWT: 0.51           Doppler Measurements & Calculations  MV E max jelani: 96.0 cm/sec  MV A max jelani: 101.0 cm/sec  MV E/A: 0.95  MV max P.9 mmHg  MV mean P.9 mmHg  MV V2 VTI: 37.1 cm  MV P1/2t max jelani: 114.0 cm/sec  MV P1/2t: 63.9 msec  MVA(P1/2t): 3.4 cm2  MV dec slope: 522.3 cm/sec2  MV dec time: 0.16 sec  Ao V2 max: 130.3 cm/sec  Ao max P.0 mmHg  PA acc time: 0.10 sec  TR max jelani: 301.5 cm/sec  TR max P.4 mmHg  E/E' av.4  Lateral E/e': 11.6  Medial  E/e': 17.3              _____________________________________________________________________________  __        Report approved by: Dr. Roney Ewing 2021 03:25 PM      Echocardiogram Limited    Narrative    [id number]  [id number]  [id number]  [id number]^AUDIE^ABDI           [facility]  Echocardiography Laboratory  [address] [place] [address]        Name: NATALIE LIRA  MRN: [id number]  : 1945  Study Date: 2021 09:01 AM  Age: 75 yrs  Gender: Female  Patient Location: [facility]  Reason For Study: Stress Cardiomyopathy  Ordering Physician: ABDI BRONSON  Performed By: Saadia Tubbs     BSA: 1.7 m2  Height: 62 in  Weight: 156 lb  HR: 82  _____________________________________________________________________________  __        Procedure  Limited Portable Echo Adult.  _____________________________________________________________________________  __        Interpretation Summary     Left ventricular systolic function is moderately reduced.  The visual ejection fraction is estimated at 35-40%.  There are regional wall motion abnormalities as specified.  The study was technically difficult. Compared to the prior study dated 21,  there have been no changes.  _____________________________________________________________________________  __        Left Ventricle  Left ventricular systolic function is moderately reduced. The visual ejection  fraction is estimated at 35-40%. Mid and distal septal, lateral, anterior,  inferior and apical akinesis. There are regional wall motion abnormalities as  specified.     Right Ventricle  The right ventricle is normal size. The right ventricular systolic function is  normal.     Mitral Valve  There is moderate mitral annular calcification.        Tricuspid Valve  There is mild to moderate (1-2+) tricuspid regurgitation. The right  ventricular systolic pressure is approximated at 42mmHg plus the right atrial  pressure. Right  ventricular systolic pressure is elevated, consistent with  mild to moderate pulmonary hypertension.     Aortic Valve  The aortic valve is trileaflet. No aortic regurgitation is present. No aortic  stenosis is present.     Pulmonic Valve  The pulmonic valve is not well visualized.     Pericardium  There is no pericardial effusion.     _____________________________________________________________________________  __                             Report approved by: Kalli Cee 01/18/2021 09:48 AM                    _____________________________________________________________________________  __      NM MPI w Lexiscan     Value    Target     Baseline Systolic     Baseline Diastolic BP 80    Last Stress Systolic     Last Stress Diastolic BP 83    Baseline HR 72    Max HR 99    Calculated Percent HR 68    Rate Pressure Product 16,533.0    Nuc Rest EF 59    Left Ventricular EF 62    Narrative      The nuclear stress test is abnormal.    There is a medium sized area of a severe degree of infarction in the   apical segment(s) of the left ventricle. This appears to be in the left   anterior descending distribution. There is mild mendel-infarct ischemia.    Left ventricular function is normal.    The left ventricular ejection fraction at rest is 59%.  The left   ventricular ejection fraction at stress is 62%.    There is no prior study for comparison.           Discharge Medications   Current Discharge Medication List        START taking these medications    Details   aspirin (ASA) 81 MG EC tablet Take 1 tablet (81 mg) by mouth daily  Qty: 30 tablet, Refills: 0    Associated Diagnoses: ACS (acute coronary syndrome) (H)      atorvastatin (LIPITOR) 40 MG tablet Take 1 tablet (40 mg) by mouth every evening  Qty: 30 tablet, Refills: 0    Associated Diagnoses: ACS (acute coronary syndrome) (H)      HYDROmorphone (DILAUDID) 2 MG tablet Take 0.5-1 tablets (1-2 mg) by mouth every 4 hours as needed for moderate  to severe pain  Qty: 15 tablet, Refills: 0    Associated Diagnoses: ACS (acute coronary syndrome) (H)      metoprolol tartrate (LOPRESSOR) 25 MG tablet Take 1 tablet (25 mg) by mouth 2 times daily  Qty: 60 tablet, Refills: 0    Associated Diagnoses: ACS (acute coronary syndrome) (H)      omeprazole (PRILOSEC) 20 MG DR capsule Take 1 capsule (20 mg) by mouth every morning (before breakfast)  Qty: 30 capsule, Refills: 0    Associated Diagnoses: ACS (acute coronary syndrome) (H)      ondansetron (ZOFRAN-ODT) 4 MG ODT tab Take 1 tablet (4 mg) by mouth every 6 hours as needed for nausea or vomiting  Qty: 30 tablet, Refills: 0    Associated Diagnoses: ACS (acute coronary syndrome) (H)           CONTINUE these medications which have NOT CHANGED    Details   acetaminophen (TYLENOL) 325 MG tablet Take 325-650 mg by mouth every 6 hours as needed for mild pain      donepezil (ARICEPT) 10 MG tablet Take 10 mg by mouth At Bedtime      gabapentin (NEURONTIN) 300 MG capsule Take 900 mg by mouth At Bedtime For restless leg syndrome       Multiple Vitamins-Minerals (MULTIVITAMIN ADULT) TABS Take 1 tablet by mouth daily      ondansetron (ZOFRAN) 4 MG tablet Take 4 mg by mouth every 8 hours as needed for nausea       ribociclib (KISQALI) 200 MG tablet Take 2 tablets (400 mg) by mouth daily for 21 days, followed by 7 days off in a 28 day cycle           Allergies   Allergies   Allergen Reactions    Amoxicillin-Pot Clavulanate Itching    Erythromycin Nausea    Latex      PN: Latex Sensitivity Flag    Levofloxacin      PN: joint pain    Sulfa Drugs GI Disturbance    Oxycodone Rash

## 2021-01-22 NOTE — PROGRESS NOTES
[facility]    General Surgery Progress Note          Assessment and Plan:   Assessment:    Lisa Galloway is a 75 year old female s/p Procedure(s):  LAPAROSCOpy diagnostic, peritoneal biopsy,  2 Days Post-Op . Cholecystectomy aborted due to dense inflammation surrounding the infundibulum and triangle of calot/diana hepatis. No significant gallbladder inflammation to suggest cholecystitis intraop.  Peritoneal studding and ascites (1L removed) suggest possible peritoneal metastasis - biopsy and cytology fluid sent    - doing well   - pathology still pending      Plan:   Pain management: Tylenol  Bowel: +gas, +small BM  Diet: regular  Activity: encourage ambulation 3-4 x daily  DVT prophylaxis: Lovenox   Dispo: Ok to discharge home from surgical perspective           Interval History:   Pt sitting up in chair.  Has mild discomfort when changing positions.  Tolerated full liquids yesterday.  Passing gas with small BM last night per patient.  Ambulating with assistance.  Voiding well own.          Physical Exam:   Temp: 97.9  F (36.6  C) Temp src: Oral BP: (!) 153/65 Pulse: 63   Resp: 20   SpO2: 94 % O2 Device: None (Room air)        I/O last 3 completed shifts:  In: 210 [P.O.:200; I.V.:10]  Out: -     Constitutional: alert and no distress   Abdomen: Abdomen soft, non-tender. Incisions: dressings removed, steri strips in place - clean/dry/intact             Data:   Data   Recent Labs   Lab 01/22/21  0712 01/21/21  0628 01/19/21  1230 01/18/21  0740 01/16/21  0452 01/16/21  0452   WBC 6.7 6.4  --  5.7  --  4.9   HGB 8.1* 8.2*  --  10.1*  --  8.8*   * 110*  --  105*  --  110*    313 340 316  --  261   INR  --   --   --   --   --  1.14    140 141  --   --  143   POTASSIUM 3.4 3.8 3.7 4.4   < > 3.4   CHLORIDE 110* 111* 112*  --   --  115*   CO2 26 25 25  --   --  26   BUN 16 15 12  --   --  10   CR 1.10* 1.09* 1.17*  --   --  0.93   ANIONGAP 2* 4 4  --   --  2*   EARL 7.4* 7.6*  7.8*  --   --  7.5*   GLC 91 115* 96  --   --  124*   ALBUMIN  --   --  2.2*  --   --   --    PROTTOTAL  --   --  5.3*  --   --   --    BILITOTAL  --   --  0.5  --   --   --    ALKPHOS  --   --  59  --   --   --    ALT  --   --  32  --   --   --    AST  --   --  41  --   --   --     < > = values in this interval not displayed.        Lesvia Ruelas PA-C     Seen and examined,agree with above  She has tolerated some solids today and seems appropriate for discharge.  Continue PPI  Will discuss path when signed out.

## 2021-01-22 NOTE — PLAN OF CARE
Presentation/Diagnosis: PT admitted 1/13 with recurrent abdominal pain. 1/20 lap procedure for cholecystectomy revealed solid adhesions in the triad of calot. Thought to be possible metastatic peritoneal cancer. Biopsies were done but no results yet.   History: Metastatic breast cancer to the bone. Breast cancer, HLD, GERD, asthma, anemia   Labs/Protocols: K: 3.8, Trop 2.8 on admission.   Vitals: VSS. See flow sheet.   Respiratory: Crackles noted in lower lobes.   Neuro: Aox4. Flat effect.   GI/: Continent of bowel and bladder.   Skin: Lap sites on abdomen because of lap procedure 1/20. Dressings clean, dry, intact. Skin bruised but otherwise WdL.   LDA's: Midline cath, no blood return noted this shift.   Diet: Full liquid, advance as tolerated.   Activity: SBA w/gait belt walker    Plan: Plan is to maintain comfort and control pain. Possible discharge in 1-2 days after adequate oral intake is noted and abdominal pain is under control.

## 2021-01-22 NOTE — PLAN OF CARE
Pt adequate for discharge. VSS. Midline removed, patient tolerated well. Patients medications given to patient and information about medications and future appointments reviewed with patient. Storage or opiods reviewed with patient and safe handling. Patient to follow up with oncology and cardiology outpatient.  picked wife up to transport home.

## 2021-01-22 NOTE — PROGRESS NOTES
Surgery-Angelica  Seen this afternoon to discuss pathology report from her laparoscopy; consistent with lobular breast cancer. Evidently Dr Hua also relayed this information to her as well. I've encouraged her to follow up with her oncologist after discharge to discuss any further treatment.

## 2021-01-22 NOTE — PLAN OF CARE
Pt up with 1 assist and walker. Slow moving and flat affect. Poor appetite. Pt to discharge today after she receives her medications and midline out. Potassium replaced and then 3.8. Has denied pain today. Will monitor.

## 2021-01-22 NOTE — PLAN OF CARE
Diagnosis. Pancreatitis   History. Hyperlip, Breast cancer, pancreatitis, GERD, asthma   Labs/Protocol. Potassium 3.8, redraw in AM   Vitals. VSS  Respiratory. Patient on continuous ox with O2 sats in mid 90's.  Neuro. A&O, flat affect. Calm and cooperative throughout shift  GI/. Voiding appropriately.   Skin. Incisions on stomach clean dry and intact.   LDA's. PIV saline locked. Gave dilaudid on shift for pain with relief  Diet. Full liquid   Activity. SBA with GB.   Plan. Continue to monitor patient and control pain.

## 2021-01-25 LAB — COPATH REPORT: NORMAL

## 2021-01-27 ENCOUNTER — HOSPITAL ENCOUNTER (INPATIENT)
Facility: CLINIC | Age: 76
LOS: 4 days | Discharge: HOME | DRG: 555 | End: 2021-01-31
Attending: EMERGENCY MEDICINE | Admitting: INTERNAL MEDICINE
Payer: MEDICARE

## 2021-01-27 ENCOUNTER — APPOINTMENT (OUTPATIENT)
Dept: CT IMAGING | Facility: CLINIC | Age: 76
DRG: 555 | End: 2021-01-27
Attending: EMERGENCY MEDICINE
Payer: MEDICARE

## 2021-01-27 DIAGNOSIS — R31.9 URINARY TRACT INFECTION WITH HEMATURIA, SITE UNSPECIFIED: ICD-10-CM

## 2021-01-27 DIAGNOSIS — N39.0 URINARY TRACT INFECTION WITH HEMATURIA, SITE UNSPECIFIED: ICD-10-CM

## 2021-01-27 DIAGNOSIS — L03.311 CELLULITIS OF ABDOMINAL WALL: ICD-10-CM

## 2021-01-27 LAB
ALBUMIN UR-MCNC: 200 MG/DL
ANION GAP SERPL CALCULATED.3IONS-SCNC: 6 MMOL/L (ref 3–14)
APPEARANCE UR: ABNORMAL
BASOPHILS # BLD AUTO: 0.1 10E9/L (ref 0–0.2)
BASOPHILS NFR BLD AUTO: 0.7 %
BILIRUB UR QL STRIP: NEGATIVE
BUN SERPL-MCNC: 11 MG/DL (ref 7–30)
CALCIUM SERPL-MCNC: 7.8 MG/DL (ref 8.5–10.1)
CHLORIDE SERPL-SCNC: 107 MMOL/L (ref 94–109)
CO2 SERPL-SCNC: 27 MMOL/L (ref 20–32)
COLOR UR AUTO: ABNORMAL
CREAT SERPL-MCNC: 0.97 MG/DL (ref 0.52–1.04)
DIFFERENTIAL METHOD BLD: ABNORMAL
EOSINOPHIL # BLD AUTO: 0.2 10E9/L (ref 0–0.7)
EOSINOPHIL NFR BLD AUTO: 2.5 %
ERYTHROCYTE [DISTWIDTH] IN BLOOD BY AUTOMATED COUNT: 15 % (ref 10–15)
GFR SERPL CREATININE-BSD FRML MDRD: 57 ML/MIN/{1.73_M2}
GLUCOSE SERPL-MCNC: 79 MG/DL (ref 70–99)
GLUCOSE UR STRIP-MCNC: NEGATIVE MG/DL
HCT VFR BLD AUTO: 29.1 % (ref 35–47)
HGB BLD-MCNC: 9.5 G/DL (ref 11.7–15.7)
HGB UR QL STRIP: ABNORMAL
IMM GRANULOCYTES # BLD: 0 10E9/L (ref 0–0.4)
IMM GRANULOCYTES NFR BLD: 0.4 %
KETONES UR STRIP-MCNC: 40 MG/DL
LABORATORY COMMENT REPORT: NORMAL
LACTATE BLD-SCNC: 0.9 MMOL/L (ref 0.7–2)
LEUKOCYTE ESTERASE UR QL STRIP: ABNORMAL
LYMPHOCYTES # BLD AUTO: 0.8 10E9/L (ref 0.8–5.3)
LYMPHOCYTES NFR BLD AUTO: 11.7 %
MCH RBC QN AUTO: 35.8 PG (ref 26.5–33)
MCHC RBC AUTO-ENTMCNC: 32.6 G/DL (ref 31.5–36.5)
MCV RBC AUTO: 110 FL (ref 78–100)
MONOCYTES # BLD AUTO: 1 10E9/L (ref 0–1.3)
MONOCYTES NFR BLD AUTO: 15 %
NEUTROPHILS # BLD AUTO: 4.7 10E9/L (ref 1.6–8.3)
NEUTROPHILS NFR BLD AUTO: 69.7 %
NITRATE UR QL: NEGATIVE
NRBC # BLD AUTO: 0 10*3/UL
NRBC BLD AUTO-RTO: 0 /100
PH UR STRIP: 6 PH (ref 5–7)
PLATELET # BLD AUTO: 336 10E9/L (ref 150–450)
POTASSIUM SERPL-SCNC: 3.6 MMOL/L (ref 3.4–5.3)
RBC # BLD AUTO: 2.65 10E12/L (ref 3.8–5.2)
RBC #/AREA URNS AUTO: >182 /HPF (ref 0–2)
SARS-COV-2 RNA RESP QL NAA+PROBE: NEGATIVE
SODIUM SERPL-SCNC: 140 MMOL/L (ref 133–144)
SOURCE: ABNORMAL
SP GR UR STRIP: 1.03 (ref 1–1.03)
SPECIMEN SOURCE: NORMAL
UROBILINOGEN UR STRIP-MCNC: 2 MG/DL (ref 0–2)
WBC # BLD AUTO: 6.7 10E9/L (ref 4–11)
WBC #/AREA URNS AUTO: >182 /HPF (ref 0–5)

## 2021-01-27 PROCEDURE — 87086 URINE CULTURE/COLONY COUNT: CPT | Performed by: EMERGENCY MEDICINE

## 2021-01-27 PROCEDURE — 250N000011 HC RX IP 250 OP 636: Performed by: EMERGENCY MEDICINE

## 2021-01-27 PROCEDURE — 87635 SARS-COV-2 COVID-19 AMP PRB: CPT | Performed by: EMERGENCY MEDICINE

## 2021-01-27 PROCEDURE — 96360 HYDRATION IV INFUSION INIT: CPT | Mod: 59

## 2021-01-27 PROCEDURE — 81001 URINALYSIS AUTO W/SCOPE: CPT | Performed by: EMERGENCY MEDICINE

## 2021-01-27 PROCEDURE — 85025 COMPLETE CBC W/AUTO DIFF WBC: CPT | Performed by: EMERGENCY MEDICINE

## 2021-01-27 PROCEDURE — 87040 BLOOD CULTURE FOR BACTERIA: CPT | Performed by: EMERGENCY MEDICINE

## 2021-01-27 PROCEDURE — 99223 1ST HOSP IP/OBS HIGH 75: CPT | Mod: AI | Performed by: INTERNAL MEDICINE

## 2021-01-27 PROCEDURE — G1004 CDSM NDSC: HCPCS

## 2021-01-27 PROCEDURE — 120N000001 HC R&B MED SURG/OB

## 2021-01-27 PROCEDURE — 99285 EMERGENCY DEPT VISIT HI MDM: CPT | Mod: 25

## 2021-01-27 PROCEDURE — C9803 HOPD COVID-19 SPEC COLLECT: HCPCS

## 2021-01-27 PROCEDURE — 250N000009 HC RX 250: Performed by: EMERGENCY MEDICINE

## 2021-01-27 PROCEDURE — 83605 ASSAY OF LACTIC ACID: CPT | Performed by: EMERGENCY MEDICINE

## 2021-01-27 PROCEDURE — 80048 BASIC METABOLIC PNL TOTAL CA: CPT | Performed by: EMERGENCY MEDICINE

## 2021-01-27 PROCEDURE — 36415 COLL VENOUS BLD VENIPUNCTURE: CPT | Performed by: EMERGENCY MEDICINE

## 2021-01-27 RX ORDER — CEFTRIAXONE 1 G/1
1 INJECTION, POWDER, FOR SOLUTION INTRAMUSCULAR; INTRAVENOUS ONCE
Status: COMPLETED | OUTPATIENT
Start: 2021-01-27 | End: 2021-01-27

## 2021-01-27 RX ORDER — IOPAMIDOL 755 MG/ML
500 INJECTION, SOLUTION INTRAVASCULAR ONCE
Status: COMPLETED | OUTPATIENT
Start: 2021-01-27 | End: 2021-01-27

## 2021-01-27 RX ADMIN — SODIUM CHLORIDE 59 ML: 9 INJECTION, SOLUTION INTRAVENOUS at 20:26

## 2021-01-27 RX ADMIN — IOPAMIDOL 53 ML: 755 INJECTION, SOLUTION INTRAVENOUS at 20:26

## 2021-01-27 RX ADMIN — CEFTRIAXONE 1 G: 1 INJECTION, POWDER, FOR SOLUTION INTRAMUSCULAR; INTRAVENOUS at 22:19

## 2021-01-27 ASSESSMENT — ENCOUNTER SYMPTOMS
VOMITING: 0
ABDOMINAL PAIN: 1
SHORTNESS OF BREATH: 0
FEVER: 0
HEMATURIA: 1

## 2021-01-28 ENCOUNTER — TELEPHONE (OUTPATIENT)
Dept: CARDIOLOGY | Facility: CLINIC | Age: 76
End: 2021-01-28

## 2021-01-28 LAB
ANION GAP SERPL CALCULATED.3IONS-SCNC: 7 MMOL/L (ref 3–14)
BACTERIA SPEC CULT: NORMAL
BASOPHILS # BLD AUTO: 0 10E9/L (ref 0–0.2)
BASOPHILS NFR BLD AUTO: 0.4 %
BUN SERPL-MCNC: 10 MG/DL (ref 7–30)
CALCIUM SERPL-MCNC: 7.6 MG/DL (ref 8.5–10.1)
CHLORIDE SERPL-SCNC: 109 MMOL/L (ref 94–109)
CO2 SERPL-SCNC: 24 MMOL/L (ref 20–32)
CREAT SERPL-MCNC: 0.88 MG/DL (ref 0.52–1.04)
DIFFERENTIAL METHOD BLD: ABNORMAL
EOSINOPHIL # BLD AUTO: 0.3 10E9/L (ref 0–0.7)
EOSINOPHIL NFR BLD AUTO: 3.7 %
ERYTHROCYTE [DISTWIDTH] IN BLOOD BY AUTOMATED COUNT: 14.6 % (ref 10–15)
GFR SERPL CREATININE-BSD FRML MDRD: 64 ML/MIN/{1.73_M2}
GLUCOSE SERPL-MCNC: 82 MG/DL (ref 70–99)
HCT VFR BLD AUTO: 25.2 % (ref 35–47)
HGB BLD-MCNC: 8.4 G/DL (ref 11.7–15.7)
IMM GRANULOCYTES # BLD: 0 10E9/L (ref 0–0.4)
IMM GRANULOCYTES NFR BLD: 0.6 %
LYMPHOCYTES # BLD AUTO: 0.6 10E9/L (ref 0.8–5.3)
LYMPHOCYTES NFR BLD AUTO: 8.9 %
MCH RBC QN AUTO: 35.6 PG (ref 26.5–33)
MCHC RBC AUTO-ENTMCNC: 33.3 G/DL (ref 31.5–36.5)
MCV RBC AUTO: 107 FL (ref 78–100)
MONOCYTES # BLD AUTO: 1.1 10E9/L (ref 0–1.3)
MONOCYTES NFR BLD AUTO: 15.8 %
NEUTROPHILS # BLD AUTO: 4.8 10E9/L (ref 1.6–8.3)
NEUTROPHILS NFR BLD AUTO: 70.6 %
NRBC # BLD AUTO: 0 10*3/UL
NRBC BLD AUTO-RTO: 0 /100
PLATELET # BLD AUTO: 315 10E9/L (ref 150–450)
POTASSIUM SERPL-SCNC: 3 MMOL/L (ref 3.4–5.3)
POTASSIUM SERPL-SCNC: 3.5 MMOL/L (ref 3.4–5.3)
POTASSIUM SERPL-SCNC: 4.4 MMOL/L (ref 3.4–5.3)
RBC # BLD AUTO: 2.36 10E12/L (ref 3.8–5.2)
SODIUM SERPL-SCNC: 140 MMOL/L (ref 133–144)
SPECIMEN SOURCE: NORMAL
WBC # BLD AUTO: 6.8 10E9/L (ref 4–11)

## 2021-01-28 PROCEDURE — 99233 SBSQ HOSP IP/OBS HIGH 50: CPT | Performed by: INTERNAL MEDICINE

## 2021-01-28 PROCEDURE — 250N000013 HC RX MED GY IP 250 OP 250 PS 637: Performed by: INTERNAL MEDICINE

## 2021-01-28 PROCEDURE — 99222 1ST HOSP IP/OBS MODERATE 55: CPT | Mod: 24 | Performed by: SURGERY

## 2021-01-28 PROCEDURE — 120N000001 HC R&B MED SURG/OB

## 2021-01-28 PROCEDURE — 250N000011 HC RX IP 250 OP 636: Performed by: INTERNAL MEDICINE

## 2021-01-28 PROCEDURE — 80048 BASIC METABOLIC PNL TOTAL CA: CPT | Performed by: INTERNAL MEDICINE

## 2021-01-28 PROCEDURE — 85025 COMPLETE CBC W/AUTO DIFF WBC: CPT | Performed by: INTERNAL MEDICINE

## 2021-01-28 PROCEDURE — 36415 COLL VENOUS BLD VENIPUNCTURE: CPT | Performed by: INTERNAL MEDICINE

## 2021-01-28 PROCEDURE — 36569 INSJ PICC 5 YR+ W/O IMAGING: CPT | Mod: 52

## 2021-01-28 PROCEDURE — 84132 ASSAY OF SERUM POTASSIUM: CPT | Performed by: INTERNAL MEDICINE

## 2021-01-28 RX ORDER — ATORVASTATIN CALCIUM 40 MG/1
40 TABLET, FILM COATED ORAL EVERY EVENING
Status: DISCONTINUED | OUTPATIENT
Start: 2021-01-28 | End: 2021-01-31 | Stop reason: HOSPADM

## 2021-01-28 RX ORDER — AMOXICILLIN 250 MG
2 CAPSULE ORAL 2 TIMES DAILY PRN
Status: DISCONTINUED | OUTPATIENT
Start: 2021-01-28 | End: 2021-01-31 | Stop reason: HOSPADM

## 2021-01-28 RX ORDER — ONDANSETRON 2 MG/ML
4 INJECTION INTRAMUSCULAR; INTRAVENOUS EVERY 6 HOURS PRN
Status: DISCONTINUED | OUTPATIENT
Start: 2021-01-28 | End: 2021-01-31 | Stop reason: HOSPADM

## 2021-01-28 RX ORDER — HEPARIN SODIUM,PORCINE 10 UNIT/ML
2-5 VIAL (ML) INTRAVENOUS
Status: DISCONTINUED | OUTPATIENT
Start: 2021-01-28 | End: 2021-01-28 | Stop reason: CLARIF

## 2021-01-28 RX ORDER — ACETAMINOPHEN 325 MG/1
325-650 TABLET ORAL EVERY 6 HOURS PRN
Status: DISCONTINUED | OUTPATIENT
Start: 2021-01-28 | End: 2021-01-28

## 2021-01-28 RX ORDER — DONEPEZIL HYDROCHLORIDE 10 MG/1
10 TABLET, FILM COATED ORAL AT BEDTIME
Status: DISCONTINUED | OUTPATIENT
Start: 2021-01-28 | End: 2021-01-31 | Stop reason: HOSPADM

## 2021-01-28 RX ORDER — POTASSIUM CHLORIDE 1500 MG/1
40 TABLET, EXTENDED RELEASE ORAL ONCE
Status: COMPLETED | OUTPATIENT
Start: 2021-01-28 | End: 2021-01-28

## 2021-01-28 RX ORDER — POTASSIUM CHLORIDE 1500 MG/1
20 TABLET, EXTENDED RELEASE ORAL ONCE
Status: COMPLETED | OUTPATIENT
Start: 2021-01-28 | End: 2021-01-28

## 2021-01-28 RX ORDER — ACETAMINOPHEN 325 MG/1
650 TABLET ORAL EVERY 4 HOURS PRN
Status: DISCONTINUED | OUTPATIENT
Start: 2021-01-28 | End: 2021-01-31 | Stop reason: HOSPADM

## 2021-01-28 RX ORDER — CEFTRIAXONE 1 G/1
1 INJECTION, POWDER, FOR SOLUTION INTRAMUSCULAR; INTRAVENOUS EVERY 24 HOURS
Status: DISCONTINUED | OUTPATIENT
Start: 2021-01-28 | End: 2021-01-28

## 2021-01-28 RX ORDER — POLYETHYLENE GLYCOL 3350 17 G/17G
17 POWDER, FOR SOLUTION ORAL DAILY PRN
Status: DISCONTINUED | OUTPATIENT
Start: 2021-01-28 | End: 2021-01-31 | Stop reason: HOSPADM

## 2021-01-28 RX ORDER — HYDROMORPHONE HYDROCHLORIDE 1 MG/ML
0.2 INJECTION, SOLUTION INTRAMUSCULAR; INTRAVENOUS; SUBCUTANEOUS
Status: DISCONTINUED | OUTPATIENT
Start: 2021-01-28 | End: 2021-01-31 | Stop reason: HOSPADM

## 2021-01-28 RX ORDER — NALOXONE HYDROCHLORIDE 0.4 MG/ML
0.4 INJECTION, SOLUTION INTRAMUSCULAR; INTRAVENOUS; SUBCUTANEOUS
Status: DISCONTINUED | OUTPATIENT
Start: 2021-01-28 | End: 2021-01-31 | Stop reason: HOSPADM

## 2021-01-28 RX ORDER — NALOXONE HYDROCHLORIDE 0.4 MG/ML
0.2 INJECTION, SOLUTION INTRAMUSCULAR; INTRAVENOUS; SUBCUTANEOUS
Status: DISCONTINUED | OUTPATIENT
Start: 2021-01-28 | End: 2021-01-31 | Stop reason: HOSPADM

## 2021-01-28 RX ORDER — LIDOCAINE 40 MG/G
CREAM TOPICAL
Status: ACTIVE | OUTPATIENT
Start: 2021-01-28 | End: 2021-01-31

## 2021-01-28 RX ORDER — LIDOCAINE 40 MG/G
CREAM TOPICAL
Status: DISCONTINUED | OUTPATIENT
Start: 2021-01-28 | End: 2021-01-28 | Stop reason: CLARIF

## 2021-01-28 RX ORDER — ONDANSETRON 4 MG/1
4 TABLET, ORALLY DISINTEGRATING ORAL EVERY 6 HOURS PRN
Status: DISCONTINUED | OUTPATIENT
Start: 2021-01-28 | End: 2021-01-31 | Stop reason: HOSPADM

## 2021-01-28 RX ORDER — GABAPENTIN 300 MG/1
900 CAPSULE ORAL AT BEDTIME
Status: DISCONTINUED | OUTPATIENT
Start: 2021-01-28 | End: 2021-01-31 | Stop reason: HOSPADM

## 2021-01-28 RX ORDER — METOPROLOL TARTRATE 25 MG/1
25 TABLET, FILM COATED ORAL 2 TIMES DAILY
Status: DISCONTINUED | OUTPATIENT
Start: 2021-01-28 | End: 2021-01-31 | Stop reason: HOSPADM

## 2021-01-28 RX ORDER — CEFTRIAXONE 1 G/1
1 INJECTION, POWDER, FOR SOLUTION INTRAMUSCULAR; INTRAVENOUS EVERY 24 HOURS
Status: DISCONTINUED | OUTPATIENT
Start: 2021-01-28 | End: 2021-01-30

## 2021-01-28 RX ORDER — AMOXICILLIN 250 MG
1 CAPSULE ORAL 2 TIMES DAILY PRN
Status: DISCONTINUED | OUTPATIENT
Start: 2021-01-28 | End: 2021-01-31 | Stop reason: HOSPADM

## 2021-01-28 RX ADMIN — POTASSIUM CHLORIDE 40 MEQ: 1500 TABLET, EXTENDED RELEASE ORAL at 08:26

## 2021-01-28 RX ADMIN — DONEPEZIL HYDROCHLORIDE 10 MG: 10 TABLET ORAL at 03:18

## 2021-01-28 RX ADMIN — METOPROLOL TARTRATE 25 MG: 25 TABLET, FILM COATED ORAL at 21:36

## 2021-01-28 RX ADMIN — DONEPEZIL HYDROCHLORIDE 10 MG: 10 TABLET ORAL at 21:36

## 2021-01-28 RX ADMIN — METOPROLOL TARTRATE 25 MG: 25 TABLET, FILM COATED ORAL at 03:18

## 2021-01-28 RX ADMIN — CEFTRIAXONE 1 G: 1 INJECTION, POWDER, FOR SOLUTION INTRAMUSCULAR; INTRAVENOUS at 21:49

## 2021-01-28 RX ADMIN — GABAPENTIN 900 MG: 300 CAPSULE ORAL at 21:35

## 2021-01-28 RX ADMIN — ATORVASTATIN CALCIUM 40 MG: 40 TABLET, FILM COATED ORAL at 21:36

## 2021-01-28 RX ADMIN — METOPROLOL TARTRATE 25 MG: 25 TABLET, FILM COATED ORAL at 08:26

## 2021-01-28 RX ADMIN — GABAPENTIN 900 MG: 300 CAPSULE ORAL at 03:18

## 2021-01-28 RX ADMIN — OMEPRAZOLE 20 MG: 20 CAPSULE, DELAYED RELEASE ORAL at 06:48

## 2021-01-28 RX ADMIN — POTASSIUM CHLORIDE 20 MEQ: 1500 TABLET, EXTENDED RELEASE ORAL at 10:38

## 2021-01-28 ASSESSMENT — ACTIVITIES OF DAILY LIVING (ADL)
ADLS_ACUITY_SCORE: 16

## 2021-01-28 NOTE — PLAN OF CARE
VSS ex elevated Bp. Weeping noted above left IV site from previous infiltrated IV site. Right arm lymphaedema. PICC was not successfully placed d/t previous infiltrated IV; PIV in left forearm. LS clear, diminished.

## 2021-01-28 NOTE — ED NOTES
[facility]  ED Nurse Handoff Report    Lisa Galloway is a 75 year old female   ED Chief complaint: Post-op Problem  . ED Diagnosis:   Final diagnoses:   Cellulitis of abdominal wall   Urinary tract infection with hematuria, site unspecified     Allergies:   Allergies   Allergen Reactions     Amoxicillin-Pot Clavulanate Itching     Erythromycin Nausea     Latex      PN: Latex Sensitivity Flag     Levofloxacin      PN: joint pain     Sulfa Drugs GI Disturbance     Oxycodone Rash       Code Status: not on file  Activity level - Baseline/Home:  Independent. Activity Level - Current:   Stand by Assist. Lift room needed: No. Bariatric: No   Needed: No   Isolation: No. Infection: Not Applicable.     Vital Signs:   Vitals:    01/27/21 1834 01/27/21 2121 01/27/21 2123   BP: 116/68 (!) 153/63    Pulse: 73 71    Resp: 20     Temp: 97.8  F (36.6  C)     TempSrc: Oral     SpO2: 99%  100%   Weight: 68.9 kg (152 lb)         Cardiac Rhythm:  ,      Pain level:    Patient confused: No. Patient Falls Risk: No.   Elimination Status: Has voided   Patient Report - Initial Complaint: Post op problem. Focused Assessment:  Lisa Galloway is a 75 year old female with history of hyperlipidemia, metastatic breast cancer with widespread osseous mets on oral Ribociclib chemotherapy every 2 weeks, pancreatitis, GERD, and asthma who presents with acute onset of hematuria today. The patient states that her urine was pink in color earlier today, but more recently the urine was bright red in color.     The patient had been admitted to the hospital from 1/08 to 01/12 for abdominal pain that was likely to be related to gallstone pancreatitis based on abdomen CT and elevated lipase. She returned to the hospital on 01/13 for recurrent upper abdominal pain. Her troponin was elevated at the time, and nuclear stress test showed medium size infarct in the LAD distribution that cardiology thought was likely old and recommended  medical management. She subsequently went in for cholecystectomy on 01/20, but instead they found adhesions near the triangle of Calot, concerning for metastatic cancer. Biopsies were taken, which that confirmed metastatic cancer, and 1 L of ascites was removed. She was discharged on 01/22 with instructions to follow-up with her oncologist for further management. She was also started on aspirin, metoprolol, and atorvastatin for her MI.      Since then, she has noticed increased pain over her RUQ with an associated lump over the incision site (from her attempted cholecystectomy). She denies any fevers, vomiting, or difficulty breathing.     **Incision sites WDL, open to air  **Pt has now had 2 ultrasound guided IVs infiltrate, vascular access paged to place midline  Tests Performed:   Labs Ordered and Resulted from Time of ED Arrival Up to the Time of Departure from the ED   ROUTINE UA WITH MICROSCOPIC - Abnormal; Notable for the following components:       Result Value    Ketones Urine 40 (*)     Blood Urine Large (*)     Protein Albumin Urine 200 (*)     Leukocyte Esterase Urine Moderate (*)     WBC Urine >182 (*)     RBC Urine >182 (*)     All other components within normal limits   CBC WITH PLATELETS DIFFERENTIAL - Abnormal; Notable for the following components:    RBC Count 2.65 (*)     Hemoglobin 9.5 (*)     Hematocrit 29.1 (*)      (*)     MCH 35.8 (*)     All other components within normal limits   BASIC METABOLIC PANEL - Abnormal; Notable for the following components:    GFR Estimate 57 (*)     Calcium 7.8 (*)     All other components within normal limits   LACTIC ACID WHOLE BLOOD   SARS-COV-2 (COVID-19) VIRUS RT-PCR   URINE CULTURE AEROBIC BACTERIAL   BLOOD CULTURE   BLOOD CULTURE     CT Abdomen Pelvis w Contrast   Final Result   IMPRESSION:    1.  There is worsening perihepatic and abdominal ascites. Gallbladder is mildly distended with stable cyst anterior to the gallbladder. There is new  subcutaneous air in the upper abdominal wall presumably postsurgical. Large amount of subcutaneous fluid    and stranding now seen in the right lateral abdominal wall.      2.  Wall thickening involving a few small bowel loops in the right midabdomen compatible with enteritis.      3.  Double-J right ureteric stent without significant hydronephrosis. Parapelvic cysts in the right kidney. A few nonobstructing stones in the upper left kidney.      4.  Breast prostheses with diffuse sclerotic metastatic lesions throughout the visualized skeleton.          Treatments provided: see MAR  Family Comments:  at bedsides  OBS brochure/video discussed/provided to patient:  N/A  ED Medications:   Medications   cefTRIAXone (ROCEPHIN) 1 g vial to attach to  mL bag for ADULTS or NS 50 mL bag for PEDS (1 g Intravenous New Bag 1/27/21 2219)   CT Scan Flush (59 mLs Intravenous Given 1/27/21 2026)   iopamidol (ISOVUE-370) solution 500 mL (53 mLs Intravenous Given 1/27/21 2026)     Drips infusing:  No  For the majority of the shift, the patient's behavior Green. Interventions performed were rounding.    Sepsis treatment initiated: No     Patient tested for COVID 19 prior to admission: YES    ED Nurse Name/Phone Number: Tiarra Timmons RN,   10:25 PM  RECEIVING UNIT ED HANDOFF REVIEW    Above ED Nurse Handoff Report was reviewed: Yes   Reviewed by: Cecy Brenner RN on January 28, 2021 at 12:15 AM

## 2021-01-28 NOTE — CONSULTS
General Surgery Consultation    Lisa Galloway MRN# [id number]   Age: 75 year old YOB: 1945     Date of Admission:  1/27/2021    Reason for consult:            Abdominal wall swelling postop       Requesting physician:            Reymundo Rodriguez MD         Assessment:    Lisa Galloway is a 75 year old female with breast cancer metastatic to bone s/p diagnostic laparoscopy last week, aborted cholecystectomy due to dense adhesions at the duodenum/triangle of calot and evidence of peritoneal metastasis and ascites - biopsy confirmed metastatic breast cancer, now admitted to induration/swelling over the right abdominal wall, under the region of the lateral 5mm ports. CT does not show any drainable fluid, just stranding in this region - presuming cellulitis/soft tissue infection though normal WBC and afebrile. Subcutaneous air under midline ports likely post surgical, no findings on exam in this region.     Plan:  Agree with antibiotics, currently ceftriaxone. Will monitor abdominal wall site as it may organize into a drainable fluid collection    HPI:  Lisa Galloway is a 75 year old female who was admitted through the ED with pain,  and swelling on her right lateral abdominal wall under the incisions from surgery last week for the past. She states she just noticed it yesterday        PMH:  Breast cancer with metastasis to bone and peritoneum  nstemi    PSH:  Past Surgical History:   Procedure Laterality Date     LAPAROSCOPY DIAGNOSTIC (GENERAL) N/A 1/20/2021    Procedure: LAPAROSCOpy diagnostic, peritoneal biopsy,;  Surgeon: Marianna Sarabia MD;  Location: [facility] OR   je mastectomy    Allergies:  Allergies   Allergen Reactions     Amoxicillin-Pot Clavulanate Itching     Erythromycin Nausea     Latex      PN: Latex Sensitivity Flag     Levofloxacin      PN: joint pain     Sulfa Drugs GI Disturbance     Oxycodone Rash       Home Medications:  No current outpatient medications on file.       Social  History:  Social History     Tobacco Use     Smoking status: Never Smoker     Smokeless tobacco: Never Used   Substance Use Topics     Alcohol use: Not Currently     Drug use: Never       Family History:  Family history reviewed and is not pertinent.    ROS:  The 10 point review of systems is negative other than noted in the HPI and above.    PE:  /63 (BP Location: Left arm)   Pulse 74   Temp 98.7  F (37.1  C) (Oral)   Resp 18   Wt 66.4 kg (146 lb 4.8 oz)   SpO2 93%   BMI 26.76 kg/m    General appearance: well-nourished, no apparent distress  Lungs: respirations unlabored  CV: pulse regular  Abdomen: there is a band of induration (~5x15cm) underlying the right lateral abdominal wall port incision sites. There is no fluctuance. Very mild red/purple hue to the skin over the lateral most portsite that is blanching. Other port sites in midline are normal appearing  Psych- mood and affect are appropriate  Neurologic- alert, speech is clear, moves all extremities with good strength      Labs:  Reviewed  hypoK  Hb 8.4 (stable from last admission)  Wbc 6.8    Imaging:  All imaging studies reviewed by me.    CT Abdomen Pelvis w Contrast    Narrative    EXAM: CT ABDOMEN PELVIS W CONTRAST  LOCATION: [facility]  DATE/TIME: 1/27/2021 8:24 PM    INDICATION: Hematuria.  COMPARISON: 01/08/2021.  TECHNIQUE: CT scan of the abdomen and pelvis was performed following injection of IV contrast. Multiplanar reformats were obtained. Dose reduction techniques were used.  CONTRAST: 53 mL Isovue-370.    FINDINGS:   LOWER CHEST: Bilateral breast prostheses. Small pleural effusions have developed with bibasilar consolidation. Moderate-sized esophageal hiatal hernia.    HEPATOBILIARY: Gallbladder is distended. Stable cystic mass anterior to the gallbladder with increasing perihepatic ascites.    PANCREAS: Normal.    SPLEEN: Normal.    ADRENAL GLANDS: Normal.    KIDNEYS/BLADDER: Double-J right ureteric stent without  significant hydronephrosis.    BOWEL: Wall thickening involving a long segment of small bowel within the right midabdomen laterally compatible with enteritis.    LYMPH NODES: Normal.    VASCULATURE: Unremarkable.    PELVIC ORGANS: Small amount of ascites.    MUSCULOSKELETAL: Diffuse sclerotic metastatic disease throughout the visualized skeleton.      Impression    IMPRESSION:   1.  There is worsening perihepatic and abdominal ascites. Gallbladder is mildly distended with stable cyst anterior to the gallbladder. There is new subcutaneous air in the upper abdominal wall presumably postsurgical. Large amount of subcutaneous fluid   and stranding now seen in the right lateral abdominal wall.    2.  Wall thickening involving a few small bowel loops in the right midabdomen compatible with enteritis.    3.  Double-J right ureteric stent without significant hydronephrosis. Parapelvic cysts in the right kidney. A few nonobstructing stones in the upper left kidney.    4.  Breast prostheses with diffuse sclerotic metastatic lesions throughout the visualized skeleton.         This note was created using voice recognition software. Undetected word substitutions or other errors may have occurred.     Time spent with the patient, reviewing the EMR, reviewing laboratory and imaging studies, more than 50% of which was counseling and coordinating care:  45 minutes..     Marianna Sarabia MD

## 2021-01-28 NOTE — PROGRESS NOTES
Cross cover was paged regarding request for PICC line insertion orders.  I spoke to the patient's RN.  Apparently the patient had a vascular access PICC stat order placed while she was in the ER.  She did have 2 peripheral IVs that infiltrated and there has been difficulty getting access.  Unclear to me why PICC stat was ordered initially in the ER as she is vitally stable and she did get her dose of ceftriaxone and is not receiving any other current IV medications.  I do not see any documentation of PICC line being ordered other than from the order history.  She received ceftriaxone at 22:19 so technically would not be due until the evening.  The PICC line is currently in place.  -Placed PICC line order set

## 2021-01-28 NOTE — ED NOTES
Pt's 2nd IV that was guided by ultrasound infiltrated.  Md and charge nurse aware, heat pack applied.  Pt received most of rocephin.  MD ordered for vascular access to come place line.

## 2021-01-28 NOTE — PROGRESS NOTES
[facility] nurse/PICCSTAT - Midline/ vascular access ordered in the ER. Right arm has lymphnode precautions so we are not able to use it. The left upper arm is extremely edematous and I was not able to get guide wire to thread in brachial veins.   I was able to start an IV in the left forearm.   Joshua Bliss RN on 1/28/2021 at 1:47 AM

## 2021-01-28 NOTE — H&P
[facility]  Hospitalist Admission Note  Name: Lisa Galloway    MRN: [id number]  YOB: 1945    Age: 75 year old  Date of admission: 1/27/2021  Primary care provider: Nichole Lira            Assessment and Plan:   Lisa Galloway is a 75 year old female with complicated medical and oncologic history such as breast cancer with widespread osseous metastatic lesions, recently diagnosed with pancreatitis, newly diagnosed with peritoneal carcinomatosis, recent non-STEMI currently being treated medically, attempted laparoscopic cholecystectomy, ascites, and was discharged last 1/22/2021 and presented back in the emergency room earlier today due to concerns of possible hematuria as her urine becoming more pinkish to reddish color with accompanying increasing lump and pain on the right upper quadrant prior incision site for her attempted cholecystectomy    1.  Suspected cellulitis of abdominal wall  2.  Gross hematuria, UTI, possible hemorrhagic cystitis  3.  Newly diagnosed peritoneal carcinomatosis  4.  Metastatic breast cancer with known widespread osseous metastatic numerous lesions  5.  Coronary artery disease with recent non-STEMI  6.  Physical deconditioning  7.  Severe malnutrition secondary to chronic illness  8.  Stable chronic macrocytic anemia  9.  Stable CKD    Admit as inpatient at risk for clinical deterioration.  Continuation of IV antibiotics currently on ceftriaxone  -Cultures to be followed with her UTI  -No drainable abscess seen on her suspected abdominal wall cellulitis.  General surgery service made aware from the emergency room.  Requested formal evaluation as inpatient  -Kidney function, electrolytes are within normal limits.  -Patient complaining of hematuria no worsening anemia seen.  Suspected hemorrhagic cystitis  No reported other bleeding tendencies symptomatology.  -With this hematuria hold her aspirin for now  -Resume her statins and beta-blockers if blood  pressures permitting given recent non-STEMI, newly diagnosed cardiomyopathy  -May have regular diet  -Current working diagnosis, plan of care, indication for hospitalization were discussed in detail with Lisa and her  present at bedside earlier in the emergency room.  They were provided with opportunities to ask questions that I have answered the best my ability.    Code status: DNR/DNI  Admit to inpatient  Prophylaxis: Mechanical, no chemo DVT prophylaxis for now given earlier hematuria  Disposition: Hopeful for home discharge but likely might be requiring at least 2 inpatient hospitalization days          Chief Complaint:   Hematuria, increasing lump and tenderness on right upper quadrant prior incision site       Source of Information:   Patient with fair reliability,  at bedside with fair  Discussion with ED physician  Review of E chart records         History of Present Illness:   Lisa Galloway is a 75 year old female with complicated medical and oncologic history such as breast cancer with widespread osseous metastatic lesions, recently diagnosed with pancreatitis, newly diagnosed with peritoneal carcinomatosis, recent non-STEMI currently being treated medically, attempted laparoscopic cholecystectomy, ascites, and was discharged last 1/22/2021 and presented back in the emergency room earlier today due to concerns of possible hematuria as her urine becoming more pinkish to reddish color with accompanying increasing lump and pain on the right upper quadrant prior incision site for her attempted cholecystectomy.  However Ms. Gonzalez stated no fever spikes, vomiting, nausea, bleeding tendencies such as coffee-ground emesis, bright red blood per rectum, diarrhea, mental status changes, sore throat, chills, chest pain or shortness of breath.  She stated that she still has decent appetite, last bowel movement was normal and satisfying yesterday.   Upon her presentation the emergency room she was  found with stable hemodynamics, no hypoxia, afebrile and further investigation revealed prominent lump and some tenderness on the right upper quadrant, concerns for underlying infectious process with cellulitis, urine analysis also revealed hematuria and concerns for underlying UTI, CT of the abdomen and pelvis revealed abdominal ascites, possible subcutaneous air in the upper abdominal wall likely postsurgical, no clear evidence of drainable abscess.  She was started on antibiotics with ceftriaxone, she demonstrated no leukocytosis and no lactic acidosis.  Her COVID-19 screen was negative.  During the time my evaluation I found Lisa laying comfortably in bed, as always pleasant, cooperative and interactive.  She endorses no other new complaints.          Past Medical History:   No past medical history on file.          Past Surgical History:     Past Surgical History:   Procedure Laterality Date     LAPAROSCOPY DIAGNOSTIC (GENERAL) N/A 1/20/2021    Procedure: LAPAROSCOpy diagnostic, peritoneal biopsy,;  Surgeon: Marianna Sarabia MD;  Location:  OR             Social History:     Social History     Tobacco Use     Smoking status: Never Smoker     Smokeless tobacco: Never Used   Substance Use Topics     Alcohol use: Not Currently             Family History:   Family history was fully reviewed and non-contributory in this case.         Allergies:     Allergies   Allergen Reactions     Amoxicillin-Pot Clavulanate Itching     Erythromycin Nausea     Latex      PN: Latex Sensitivity Flag     Levofloxacin      PN: joint pain     Sulfa Drugs GI Disturbance     Oxycodone Rash             Medications:     Prior to Admission medications    Medication Sig Last Dose Taking? Auth Provider   acetaminophen (TYLENOL) 325 MG tablet Take 325-650 mg by mouth every 6 hours as needed for mild pain   Unknown, Entered By History   aspirin (ASA) 81 MG EC tablet Take 1 tablet (81 mg) by mouth daily   Piter Hua MD    atorvastatin (LIPITOR) 40 MG tablet Take 1 tablet (40 mg) by mouth every evening   Piter Hua MD   donepezil (ARICEPT) 10 MG tablet Take 10 mg by mouth At Bedtime   Unknown, Entered By History   gabapentin (NEURONTIN) 300 MG capsule Take 900 mg by mouth At Bedtime For restless leg syndrome    Unknown, Entered By History   HYDROmorphone (DILAUDID) 2 MG tablet Take 0.5-1 tablets (1-2 mg) by mouth every 4 hours as needed for moderate to severe pain   Piter Hua MD   metoprolol tartrate (LOPRESSOR) 25 MG tablet Take 1 tablet (25 mg) by mouth 2 times daily   Piter Hua MD   Multiple Vitamins-Minerals (MULTIVITAMIN ADULT) TABS Take 1 tablet by mouth daily   Unknown, Entered By History   omeprazole (PRILOSEC) 20 MG DR capsule Take 1 capsule (20 mg) by mouth every morning (before breakfast)   Piter Hua MD   ondansetron (ZOFRAN) 4 MG tablet Take 4 mg by mouth every 8 hours as needed for nausea    Unknown, Entered By History   ondansetron (ZOFRAN-ODT) 4 MG ODT tab Take 1 tablet (4 mg) by mouth every 6 hours as needed for nausea or vomiting   Piter Hua MD   ribociclib (KISQALI) 200 MG tablet Take 2 tablets (400 mg) by mouth daily for 21 days, followed by 7 days off in a 28 day cycle   Unknown, Entered By History             Review of Systems:   A Comprehensive greater than 10 system review of systems was carried out.  Pertinent positives and negatives are noted above.  Otherwise negative for contributory information.           Physical Exam:   Blood pressure (!) 153/63, pulse 71, temperature 97.8  F (36.6  C), temperature source Oral, resp. rate 20, weight 68.9 kg (152 lb), SpO2 99 %, not currently breastfeeding.  Wt Readings from Last 1 Encounters:   01/27/21 68.9 kg (152 lb)     Exam:  GENERAL: No apparent distress. Awake, alert, and fully oriented.  Frail-appearing  HEENT: Normocephalic, atraumatic. Extraocular movements  intact.  CARDIOVASCULAR: Regular rate and rhythm without murmurs or rubs. No JVD  PULMONARY: Fair air entry,  ABDOMINAL: Soft, palpable lump on right upper quadrant, tenderness upon palpation, no guarding, erythematous, no discharge seen   EXTREMITIES: No cyanosis or clubbing. No edema.  NEUROLOGICAL: CN 2-12 grossly intact, awake and alert x3, spontaneous and coherent speech. no focal neurological deficits.  DERMATOLOGICAL: No rash, ulcer, ecchymoses, jaundice.  Psych: not agitation, not combative, pleasant mood           Data:   EKG: No new EKG seen    Imaging:  Results for orders placed or performed during the hospital encounter of 01/27/21   CT Abdomen Pelvis w Contrast    Narrative    EXAM: CT ABDOMEN PELVIS W CONTRAST  LOCATION: [facility]  DATE/TIME: 1/27/2021 8:24 PM    INDICATION: Hematuria.  COMPARISON: 01/08/2021.  TECHNIQUE: CT scan of the abdomen and pelvis was performed following injection of IV contrast. Multiplanar reformats were obtained. Dose reduction techniques were used.  CONTRAST: 53 mL Isovue-370.    FINDINGS:   LOWER CHEST: Bilateral breast prostheses. Small pleural effusions have developed with bibasilar consolidation. Moderate-sized esophageal hiatal hernia.    HEPATOBILIARY: Gallbladder is distended. Stable cystic mass anterior to the gallbladder with increasing perihepatic ascites.    PANCREAS: Normal.    SPLEEN: Normal.    ADRENAL GLANDS: Normal.    KIDNEYS/BLADDER: Double-J right ureteric stent without significant hydronephrosis.    BOWEL: Wall thickening involving a long segment of small bowel within the right midabdomen laterally compatible with enteritis.    LYMPH NODES: Normal.    VASCULATURE: Unremarkable.    PELVIC ORGANS: Small amount of ascites.    MUSCULOSKELETAL: Diffuse sclerotic metastatic disease throughout the visualized skeleton.      Impression    IMPRESSION:   1.  There is worsening perihepatic and abdominal ascites. Gallbladder is mildly distended with  stable cyst anterior to the gallbladder. There is new subcutaneous air in the upper abdominal wall presumably postsurgical. Large amount of subcutaneous fluid   and stranding now seen in the right lateral abdominal wall.    2.  Wall thickening involving a few small bowel loops in the right midabdomen compatible with enteritis.    3.  Double-J right ureteric stent without significant hydronephrosis. Parapelvic cysts in the right kidney. A few nonobstructing stones in the upper left kidney.    4.  Breast prostheses with diffuse sclerotic metastatic lesions throughout the visualized skeleton.       Labs:  Recent Labs   Lab 01/27/21  2003   CULT PENDING     Recent Labs   Lab 01/27/21 1940 01/22/21  0712 01/21/21  0628   WBC 6.7 6.7 6.4   HGB 9.5* 8.1* 8.2*   HCT 29.1* 24.2* 24.7*   * 110* 110*    332 313     Recent Labs   Lab 01/27/21 1940 01/22/21  1318 01/22/21  0712 01/21/21  0628     --  138 140   POTASSIUM 3.6 3.8 3.4 3.8   CHLORIDE 107  --  110* 111*   CO2 27  --  26 25   ANIONGAP 6  --  2* 4   GLC 79  --  91 115*   BUN 11  --  16 15   CR 0.97  --  1.10* 1.09*   GFRESTIMATED 57*  --  49* 49*   GFRESTBLACK 66  --  57* 57*   EARL 7.8*  --  7.4* 7.6*     No results for input(s): SED, CRP in the last 168 hours.  Recent Labs   Lab 01/27/21 1940 01/22/21  0712 01/22/21  0149 01/21/21  2140 01/21/21  1813 01/21/21  1222 01/21/21  0900 01/21/21  0628   GLC 79 91  --   --   --   --   --  115*   BGM  --   --  106* 134* 120* 129* 95  --      No results for input(s): INR in the last 168 hours.  No results for input(s): LIPASE in the last 168 hours.  No results for input(s): TROPONIN, TROPI, TROPR in the last 168 hours.    Invalid input(s): TROP, TROPONINIES  No results for input(s): TSH in the last 168 hours.  Recent Labs   Lab 01/27/21  1849   COLOR Red   APPEARANCE Cloudy   URINEGLC Negative   URINEBILI Negative   URINEKETONE 40*   SG 1.029   UBLD Large*   URINEPH 6.0   PROTEIN 200*   NITRITE Negative    LEUKEST Moderate*   RBCU >182*   WBCU >182*     Recent Labs   Lab 01/27/21  1849   URINEKETONE 40*

## 2021-01-28 NOTE — TELEPHONE ENCOUNTER
Patient was evaluated by cardiology while inpatient for recurrent abdominal pain and elevated tropoinin. PMH: hyperlipidemia (not on statin therapy), metastatic breast cancer to spine-current radiation, recently diagnosed pancreatitis, likely from gallstones, GERD, CKD II, HTN, asthma. ECHO: LVEF 35-40% with mid/distal septal, lateral, anterior, inferior, apical akinesis, normal RV function, mild/moderate pulmonary hypertension, 1-2+ TR. 1/19/21: Erin NUC showed medium sized infarct in the apical segment in LAD distribution with mild mendel-infarct ischemia -LVEF 59%-Ischemic CM. Pt was cleared for lap felecia-during procedure found symptoms not related to gall bladder, but metastatic breast cancer with new diagnosis of peritoneal carcinomatosis. Cardiac meds started were Metoprolol and Lipitor. Pt needs to be scheduled for cardiology f/u. Pt was discharged on 1/22/21 and writer notes pt was readmitted 1/27/21 for hematuria. Will close this encounter. JOANNE Cao RN.

## 2021-01-28 NOTE — UTILIZATION REVIEW
Admission Status; Secondary Review Determination       Under the authority of the Utilization Management Committee, the utilization review process indicated a secondary review on the above patient. The review outcome is based on review of the medical records, discussions with staff, and applying clinical experience noted on the date of the review.     (x) Inpatient Status Appropriate - This patient's medical care is consistent with medical management for inpatient care and reasonable inpatient medical practice.     RATIONALE FOR DETERMINATION   75 year old female with complicated medical and oncologic history such as breast cancer with widespread osseous metastatic lesions, recently diagnosed with pancreatitis, newly diagnosed with peritoneal carcinomatosis, recent non-STEMI currently being treated medically, attempted laparoscopic cholecystectomy, ascites, and was discharged last 1/22/2021 and presented back in the emergency room earlier today due to concerns of possible hematuria as her urine becoming more pinkish to reddish color with accompanying increasing lump and pain on the right upper quadrant prior incision site for her attempted cholecystectomy  Concern for abdominal wall cellulitis in a patient who is new diagnosis of peritoneal carcinomatosis and metastatic breast cancer, severe malnutrition and multiple comorbidities.  The expected length of stay at the time of admission was more than 2 nights because of the severity of illness, intensity of service provided, and risk for adverse outcome. Inpatient admission is appropriate.     This document was produced using voice recognition software       The information on this document is developed by the utilization review team in order for the business office to ensure compliance. This only denotes the appropriateness of proper admission status and does not reflect the quality of care rendered.   The definitions of Inpatient Status and Observation Status used  in making the determination above are those provided in the CMS Coverage Manual, Chapter 1 and Chapter 6, section 70.4.   Sincerely,   KEN DELGADO MD   System Medical Director   Utilization Management   [facility].

## 2021-01-28 NOTE — ED PROVIDER NOTES
History   Chief Complaint:  Post-op Problem     The history is provided by the patient.      Lisa Galloway is a 75 year old female with history of hyperlipidemia, metastatic breast cancer with widespread osseous mets on oral Ribociclib chemotherapy every 3 weeks, pancreatitis, GERD, and asthma who presents with acute onset of hematuria today. The patient states that her urine was pink in color earlier today, but more recently the urine was bright red in color.    The patient had been admitted to the hospital from 1/08 to 01/12 for abdominal pain that was likely to be related to gallstone pancreatitis based on abdomen CT and elevated lipase. She returned to the hospital on 01/13 for recurrent upper abdominal pain. Her troponin was elevated at the time, and nuclear stress test showed medium size infarct in the LAD distribution that cardiology thought was likely old and recommended medical management. She subsequently went in for cholecystectomy on 01/20, but instead they found adhesions near the triangle of Calot, concerning for metastatic cancer. Remainder of procedure was aborted, as gallbladder did not look to be culprit source of presenting symptoms.Biopsies were taken, which that confirmed metastatic cancer, and 1 L of ascites was removed. She was discharged on 01/22 with instructions to follow-up with her oncologist for further management. She was also started on aspirin, metoprolol, and atorvastatin for her MI.     Since then, she has noticed increased pain over her RUQ with an associated lump over the incision site (from her attempted cholecystectomy). She denies any fevers, vomiting, or difficulty breathing. Of note, patient did previously have a ureteral stent placed for ureteral obstruction.    Review of Systems   Constitutional: Negative for fever.   Respiratory: Negative for shortness of breath.    Gastrointestinal: Positive for abdominal pain. Negative for vomiting.   Genitourinary: Positive for  hematuria.   All other systems reviewed and are negative.    Allergies:  Amoxicillin-Pot Clavulanate  Erythromycin  Latex  Levofloxacin  Sulfa Drugs  Oxycodone    Medications:  Aspirin 81 mg  Atorvastatin  Aricept  Gabapentin  Metoprolol tartrate  Dilaudid  Zofran  Ribociclib     Past Medical History:    DAWSON  Complicated UTI  Hydronephrosis  Osteomyelitis  Carcinoma of breast metastatic to bone  Adenomatous polyp of colon  Anxiety  Lymphedema of arm  BCC of nose  Hypertension  Hyperlipidemia  DJD  GERD  Depression     Past Surgical History:    Tonsillectomy  Mastectomy     Family History:    Mother: Heart disease and failure     Social History:  The patient was accompanied to the ED by her .   Her oncologist is through [facility].     Physical Exam     Patient Vitals for the past 24 hrs:   BP Temp Temp src Pulse Resp SpO2 Weight   01/27/21 2123 -- -- -- -- -- 100 % --   01/27/21 2121 (!) 153/63 -- -- 71 -- -- --   01/27/21 1834 116/68 97.8  F (36.6  C) Oral 73 20 99 % 68.9 kg (152 lb)       Physical Exam  General:    Well-nourished    Speaking in full sentences   Eyes:    Conjunctiva without injection or scleral icterus   ENT:    Moist mucous membranes    Nares patent    Pinnae normal   Neck:    Full ROM    No stiffness appreciated   Resp:    Lungs CTAB    No crackles, wheezing or audible rubs    Good air movement   CV:    Normal rate, regular rhythm    S1 and S2 present    No murmur, gallop or rub   GI:    BS present    Abdomen soft without distention    Laparoscopic surgical scars to abdomen are C/D/I without drainage or dehiscence    Large area of induration/firmness around R mid abdomen/flank with palpable tenderness    No expressible drainage    No guarding or rebound tenderness   Skin:    Warm, dry, well perfused    No rashes or open wounds on exposed skin   MSK:    Moves all extremities    No focal deformities or swelling   Neuro:    Alert    Answers questions appropriately    Moves all  extremities equally   Psych:    Normal affect, normal mood    Emergency Department Course   Imaging:  CT abdomen pelvis w contrast:  IMPRESSION:   1.  There is worsening perihepatic and abdominal ascites. Gallbladder is mildly distended with stable cyst anterior to the gallbladder. There is new subcutaneous air in the upper abdominal wall presumably postsurgical. Large amount of subcutaneous fluid   and stranding now seen in the right lateral abdominal wall.   2.  Wall thickening involving a few small bowel loops in the right midabdomen compatible with enteritis.   3.  Double-J right ureteric stent without significant hydronephrosis. Parapelvic cysts in the right kidney. A few nonobstructing stones in the upper left kidney.   4.  Breast prostheses with diffuse sclerotic metastatic lesions throughout the visualized skeleton.  Report per radiology     Laboratory:  CBC: HGB 9.5 (L) o/w WNL (WBC 6.7, )  BMP: GFR estimate 57 (L), calcium 7.8 (L) o/w WNL (Creatinine 0.97)     Lactic acid whole blood: 0.9  Blood Culture x2: Pending    UA with Microscopic: ketone 40 (A), blood large (A), protein albumin 200 (A), leukocyte esterase moderate (A), WBC/HPF >182 (H), RBC/HPF >182 (H) o/w WNL  Urine culture: pending    Asymptomatic COVID-19 virus by PCR: pending    Emergency Department Course:    Reviewed:  1900 I reviewed nursing notes, vitals, past medical history and care everywhere    Assessments:  1904 I obtained history and examined the patient as noted above.   2130 I rechecked the patient and explained findings.   2142 I rechecked the patient.     Consults:   2137 I consulted with Dr. Sarabia of general surgery.   2206 I consulted with Dr. Rodriguez of hospitalist service.    Interventions:  Rocephin 1 g IV    Disposition:  The patient was admitted to the hospital under the care of Dr. Rodriguez.     Impression & Plan     Medical Decision Making:  Lisa Galloway is a very pleasant 75-year-old female with a PMH  significant for metastatic breast cancer presenting to the ED for evaluation of a postoperative problem.  VS on presentation reveal no acute abnormalities.  Previous records are reviewed including 2 recent hospitalizations.  Patient presents with increased swelling, firmness, and tenderness along the right flank and mid abdomen.  Her presenting symptoms are most consistent and concerning for evolving cellulitis about the previous laparoscopic surgical sites.  Bedside ultrasound and CT does not reveal evidence of subcutaneous fluid collection amenable to percutaneous drainage at this time.  Note is made of some subcutaneous gas, though this does appear to be distinctly separate from the area of infection, and is most likely related to residual gas from epigastric laparoscopic surgical site.  Her clinical exam does not reveal signs of cellulitis or infection around this incision site.  Patient also presents with associated hematuria.  Urinalysis also reveals findings concerning for infection.  Urine culture will be added to guide appropriate therapy.  Systemically, patient is hemodynamically stable, with well-controlled symptoms during her ED course.  Lactate and WBC count are both normal.  Blood cultures x2 obtained and are presently pending.  Patient will be treated with Rocephin, which she has tolerated previously.  Case was reviewed with Dr. Sarabia of general surgery, who agreed with the above plan of care and will follow up with the patient tomorrow during her hospital stay.  Patient will be admitted to the hospital service under the care of Dr. Rodriguez for further treatment and care.  Questions were answered prior to admission.    Covid-19  Lisa Galloway was evaluated during a global COVID-19 pandemic, which necessitated consideration that the patient might be at risk for infection with the SARS-CoV-2 virus that causes COVID-19.   Applicable protocols for evaluation were followed during the patient's care.    COVID-19 was considered as part of the patient's evaluation. The plan for testing is:  a test was obtained during this visit.    Diagnosis:    ICD-10-CM    1. Cellulitis of abdominal wall  L03.311    2. Urinary tract infection with hematuria, site unspecified  N39.0     R31.9          Scribe Disclosure:  DIANA, Olga Segovia, am serving as a scribe at 6:53 PM on 1/27/2021 to document services personally performed by Bladimir Terry MD based on my observations and the provider's statements to me.          Bladimir Terry MD  01/27/21 [id number]

## 2021-01-28 NOTE — ED TRIAGE NOTES
Pt arrives to the ED due to lump at incision site that she had done last Wed. Pt states they were going to remove her gallbladder but that they instead found cancer so they did not remove it. Pain at site. Denies any drainage. No fevers. Pt states today noticing blood in urine. Sent from [facility] for further eval.

## 2021-01-28 NOTE — PHARMACY-ADMISSION MEDICATION HISTORY
Admission medication history interview status for this patient is complete. See [facility] admission navigator for allergy information, prior to admission medications and immunization status.     Medication history interview source(s):pt , Margareth, via phone per pt request  Medication history resources (including written lists, pill bottles, clinic record):EPIC list, Sure Scripts, discharge summary from 1/22/21  Primary pharmacy:[facility] on [address]    Changes made to PTA medication list:  Added: -----  Deleted: duplicate zofran prn  Changed: ----    Actions taken by pharmacist (provider contacted, etc):None     Additional medication history information:pt ribociclib is still on hold until pt see oncologist.  Per , pt last took approx 1 month ago.  Next onc appt 2/3/21.  Kept med on PTA med list since unsure if pt will resume or not.    Medication reconciliation/reorder completed by provider prior to medication history? Yes, sticky note left for MD      For patients on insulin therapy:N    Prior to Admission medications    Medication Sig Last Dose Taking? Auth Provider   acetaminophen (TYLENOL) 325 MG tablet Take 325-650 mg by mouth every 6 hours as needed for mild pain Past Week at Unknown time Yes Unknown, Entered By History   aspirin (ASA) 81 MG EC tablet Take 1 tablet (81 mg) by mouth daily  Yes Piter Hua MD   atorvastatin (LIPITOR) 40 MG tablet Take 1 tablet (40 mg) by mouth every evening  Yes Piter Hua MD   donepezil (ARICEPT) 10 MG tablet Take 10 mg by mouth At Bedtime  Yes Unknown, Entered By History   gabapentin (NEURONTIN) 300 MG capsule Take 900 mg by mouth At Bedtime For restless leg syndrome   Yes Unknown, Entered By History   HYDROmorphone (DILAUDID) 2 MG tablet Take 0.5-1 tablets (1-2 mg) by mouth every 4 hours as needed for moderate to severe pain Past Week at Unknown time Yes Piter Hua MD   metoprolol tartrate (LOPRESSOR) 25 MG  tablet Take 1 tablet (25 mg) by mouth 2 times daily  Yes Piter Hua MD   Multiple Vitamins-Minerals (MULTIVITAMIN ADULT) TABS Take 1 tablet by mouth daily  Yes Unknown, Entered By History   omeprazole (PRILOSEC) 20 MG DR capsule Take 1 capsule (20 mg) by mouth every morning (before breakfast)  Yes Piter Hua MD   ondansetron (ZOFRAN-ODT) 4 MG ODT tab Take 1 tablet (4 mg) by mouth every 6 hours as needed for nausea or vomiting Past Week at Unknown time Yes Piter Hua MD   ribociclib (KISQALI) 200 MG tablet Take 2 tablets (400 mg) by mouth daily for 21 days, followed by 7 days off in a 28 day cycle  at on hold until see oncologist on 2/3/21.  last took approx 1 month ago per   Unknown, Entered By History

## 2021-01-28 NOTE — PLAN OF CARE
Vitals: VSS. BP elevated at 145/80. C/O pain 3/10 to RUQ abdomen, dull/achy. Refused medication intervention, quiet environment/relaxation promoted.   Labs: K: 3.0, replaced per protocol. Recheck ordered for 1445.  Neuro: A&O x4. Calm, cooperative.   Respiratory: Lung sounds clear/diminished.   Cardiac: Apical pulse regular. Denies chest pain.   GI/: Hematuria present. No BMs this shift.   Skin: Blanchable redness to coccyx.   LDAs: PIV to left arm SL. NoNo in place to protect site d/t difficult PIV placement. Infiltration site to left upper arm edematous.   Diet: Regular   Activity: SBA, gb   Plan: IV abx. Surgery consult. Continue with POC.

## 2021-01-28 NOTE — PROGRESS NOTES
[facility]  Hospitalist Progress Note  Reymundo Rodriguez MD, MD 01/28/2021  (Text Page)  Reason for Stay (Diagnosis): Abdominal wall cellulitis, gross hematuria         Assessment and Plan:      Summary of Stay: Lisa Galloway is a 75 year old female with complicated medical and oncologic history such as breast cancer with widespread osseous metastatic lesions, recently diagnosed with pancreatitis, newly diagnosed with peritoneal carcinomatosis, recent non-STEMI currently being treated medically, attempted laparoscopic cholecystectomy, ascites, and was discharged last 1/22/2021 and presented back in the emergency room earlier today due to concerns of possible hematuria as her urine becoming more pinkish to reddish color with accompanying increasing lump and pain on the right upper quadrant prior incision site for her attempted cholecystectomy     1.  Suspected cellulitis of abdominal wall  2.  Gross hematuria, UTI, possible hemorrhagic cystitis  3.  Newly diagnosed peritoneal carcinomatosis  4.  Metastatic breast cancer with known widespread osseous metastatic numerous lesions  5.  Coronary artery disease with recent non-STEMI  6.  Physical deconditioning  7.  Severe malnutrition secondary to chronic illness  8.  Stable chronic macrocytic anemia  9.  Stable CKD     Continuation of inpatient care.  Remain on IV antibiotics with ceftriaxone   -Afebrile, stable hemodynamics  -Cultures to be followed with her UTI  -No drainable abscess seen on her suspected abdominal wall cellulitis.  General surgery service made aware from the emergency room.  Requested formal evaluation as inpatient-awaiting formal input  -Kidney function, electrolytes are within normal limits.  -Patient complaining of hematuria no worsening anemia seen.  Suspected hemorrhagic cystitis  No reported other bleeding tendencies symptomatology.  -With this hematuria hold her aspirin for now  -Resume her statins and beta-blockers if  blood pressures permitting given recent non-STEMI, newly diagnosed cardiomyopathy  -May have regular diet     Code status: DNR/DNI  Admit to inpatient  Prophylaxis: Mechanical, no chemo DVT prophylaxis for now given earlier hematuria  Disposition: Hopeful for home discharge but likely might be requiring at least 1-2 more inpatient hospitalization days              Interval History (Subjective):      Continuing care today.  Seen and examined.  Chart reviewed.  Case discussed with nursing service.  As always do the remained very pleasant, conversant and cooperative.  She thinks that she was able to sleep for several hours.  No reported nausea or vomiting.  Tolerating oral diet.  No reported diarrhea.  Voiding freely.  Still with tenderness on right upper quadrant but not worsening.  Endorses no chest pain, shortness of breath.  Still with intermittent hematuria                Physical Exam:      Last Vital Signs:  BP (!) 145/80 (BP Location: Left arm)   Pulse 76   Temp 98.7  F (37.1  C) (Oral)   Resp 18   Wt 66.4 kg (146 lb 4.8 oz)   SpO2 98%   BMI 26.76 kg/m      I/O last 3 completed shifts:  In: -   Out: 250 [Urine:250]  Wt Readings from Last 1 Encounters:   01/28/21 66.4 kg (146 lb 4.8 oz)     Vitals:    01/27/21 1834 01/28/21 0037   Weight: 68.9 kg (152 lb) 66.4 kg (146 lb 4.8 oz)       Constitutional: Awake, alert, cooperative, no apparent distress   Respiratory: Clear to auscultation bilaterally, no crackles or wheezing   Cardiovascular: Regular rate and rhythm, normal S1 and S2, and no murmur noted   Abdomen: Normal bowel sounds, tender upon palpation right upper quadrant, palpable lump, no bleeding, no discharge, decreased erythema, slightly warm to touch upon palpation soft, non-distended   Skin: No rashes, no cyanosis, dry to touch   Neuro: Alert and oriented x3, no weakness, spontaneous and coherent speech   Extremities: No edema, normal range of motion   Other(s): Euthymic mood, not agitated        All other systems: Negative          Medications:      All current medications were reviewed with changes reflected in problem list.         Data:      All new lab and imaging data was reviewed.   Labs:  Recent Labs   Lab 01/27/21 2003 01/27/21 1940   CULT No growth after 8 hours No growth after 8 hours     Recent Labs   Lab 01/28/21 0604 01/27/21 1940 01/22/21  0712   WBC 6.8 6.7 6.7   HGB 8.4* 9.5* 8.1*   HCT 25.2* 29.1* 24.2*   * 110* 110*    336 332     Recent Labs   Lab 01/28/21 0604 01/28/21 0147 01/27/21 1940 01/22/21  0712 01/22/21  0712     --  140  --  138   POTASSIUM 3.0* 3.5 3.6   < > 3.4   CHLORIDE 109  --  107  --  110*   CO2 24  --  27  --  26   ANIONGAP 7  --  6  --  2*   GLC 82  --  79  --  91   BUN 10  --  11  --  16   CR 0.88  --  0.97  --  1.10*   GFRESTIMATED 64  --  57*  --  49*   GFRESTBLACK 74  --  66  --  57*   EARL 7.6*  --  7.8*  --  7.4*    < > = values in this interval not displayed.     No results for input(s): SED, CRP in the last 168 hours.  Recent Labs   Lab 01/28/21 0604 01/27/21 1940 01/22/21 0712 01/22/21 0149 01/21/21 2140 01/21/21  1813 01/21/21  1222   GLC 82 79 91  --   --   --   --    BGM  --   --   --  106* 134* 120* 129*     No results for input(s): LIPASE in the last 168 hours.  No results for input(s): CHOL, HDL, LDL, TRIG, CHOLHDLRATIO in the last 168 hours.  No results for input(s): TROPONIN, TROPI, TROPR in the last 168 hours.    Invalid input(s): TROP, TROPONINIES  Recent Labs   Lab 01/27/21  1849   COLOR Red   APPEARANCE Cloudy   URINEGLC Negative   URINEBILI Negative   URINEKETONE 40*   SG 1.029   UBLD Large*   URINEPH 6.0   PROTEIN 200*   NITRITE Negative   LEUKEST Moderate*   RBCU >182*   WBCU >182*      Imaging:   Results for orders placed or performed during the hospital encounter of 01/27/21   CT Abdomen Pelvis w Contrast    Narrative    EXAM: CT ABDOMEN PELVIS W CONTRAST  LOCATION: [facility]  DATE/TIME: 1/27/2021  8:24 PM    INDICATION: Hematuria.  COMPARISON: 01/08/2021.  TECHNIQUE: CT scan of the abdomen and pelvis was performed following injection of IV contrast. Multiplanar reformats were obtained. Dose reduction techniques were used.  CONTRAST: 53 mL Isovue-370.    FINDINGS:   LOWER CHEST: Bilateral breast prostheses. Small pleural effusions have developed with bibasilar consolidation. Moderate-sized esophageal hiatal hernia.    HEPATOBILIARY: Gallbladder is distended. Stable cystic mass anterior to the gallbladder with increasing perihepatic ascites.    PANCREAS: Normal.    SPLEEN: Normal.    ADRENAL GLANDS: Normal.    KIDNEYS/BLADDER: Double-J right ureteric stent without significant hydronephrosis.    BOWEL: Wall thickening involving a long segment of small bowel within the right midabdomen laterally compatible with enteritis.    LYMPH NODES: Normal.    VASCULATURE: Unremarkable.    PELVIC ORGANS: Small amount of ascites.    MUSCULOSKELETAL: Diffuse sclerotic metastatic disease throughout the visualized skeleton.      Impression    IMPRESSION:   1.  There is worsening perihepatic and abdominal ascites. Gallbladder is mildly distended with stable cyst anterior to the gallbladder. There is new subcutaneous air in the upper abdominal wall presumably postsurgical. Large amount of subcutaneous fluid   and stranding now seen in the right lateral abdominal wall.    2.  Wall thickening involving a few small bowel loops in the right midabdomen compatible with enteritis.    3.  Double-J right ureteric stent without significant hydronephrosis. Parapelvic cysts in the right kidney. A few nonobstructing stones in the upper left kidney.    4.  Breast prostheses with diffuse sclerotic metastatic lesions throughout the visualized skeleton.

## 2021-01-29 LAB — POTASSIUM SERPL-SCNC: 3.6 MMOL/L (ref 3.4–5.3)

## 2021-01-29 PROCEDURE — 84132 ASSAY OF SERUM POTASSIUM: CPT | Performed by: INTERNAL MEDICINE

## 2021-01-29 PROCEDURE — 120N000001 HC R&B MED SURG/OB

## 2021-01-29 PROCEDURE — 250N000013 HC RX MED GY IP 250 OP 250 PS 637: Performed by: INTERNAL MEDICINE

## 2021-01-29 PROCEDURE — 99232 SBSQ HOSP IP/OBS MODERATE 35: CPT | Performed by: INTERNAL MEDICINE

## 2021-01-29 PROCEDURE — 36415 COLL VENOUS BLD VENIPUNCTURE: CPT | Performed by: INTERNAL MEDICINE

## 2021-01-29 PROCEDURE — 250N000011 HC RX IP 250 OP 636: Performed by: INTERNAL MEDICINE

## 2021-01-29 RX ORDER — MULTIPLE VITAMINS W/ MINERALS TAB 9MG-400MCG
1 TAB ORAL DAILY
Status: DISCONTINUED | OUTPATIENT
Start: 2021-01-29 | End: 2021-01-31 | Stop reason: HOSPADM

## 2021-01-29 RX ADMIN — GABAPENTIN 900 MG: 300 CAPSULE ORAL at 21:29

## 2021-01-29 RX ADMIN — HYDROMORPHONE HYDROCHLORIDE 0.2 MG: 1 INJECTION, SOLUTION INTRAMUSCULAR; INTRAVENOUS; SUBCUTANEOUS at 22:50

## 2021-01-29 RX ADMIN — ATORVASTATIN CALCIUM 40 MG: 40 TABLET, FILM COATED ORAL at 21:30

## 2021-01-29 RX ADMIN — CEFTRIAXONE 1 G: 1 INJECTION, POWDER, FOR SOLUTION INTRAMUSCULAR; INTRAVENOUS at 21:30

## 2021-01-29 RX ADMIN — OMEPRAZOLE 20 MG: 20 CAPSULE, DELAYED RELEASE ORAL at 06:37

## 2021-01-29 RX ADMIN — METOPROLOL TARTRATE 25 MG: 25 TABLET, FILM COATED ORAL at 09:53

## 2021-01-29 RX ADMIN — MULTIPLE VITAMINS W/ MINERALS TAB 1 TABLET: TAB at 13:49

## 2021-01-29 RX ADMIN — METOPROLOL TARTRATE 25 MG: 25 TABLET, FILM COATED ORAL at 21:30

## 2021-01-29 RX ADMIN — DONEPEZIL HYDROCHLORIDE 10 MG: 10 TABLET ORAL at 21:30

## 2021-01-29 ASSESSMENT — ACTIVITIES OF DAILY LIVING (ADL)
ADLS_ACUITY_SCORE: 16

## 2021-01-29 NOTE — PLAN OF CARE
Orientation: A/O x4  VS: /69 (BP Location: Left arm)   Pulse 87   Temp 98.9  F (37.2  C) (Oral)   Resp 18   Wt 66.4 kg (146 lb 4.8 oz)   SpO2 96%   BMI 26.76 kg/m    LS: clear  GI:tolerating diet, active bowels-  R cyst palpable  : hematuria, UTI  Skin: infiltration site on L arm, bruised  Activity: SBA  Pain:  denies  Lines: PIV SL in L wrist  Plan: IV abx, surgery consulted  Marianna Márquez RN on 1/28/2021 at 10:35 PM

## 2021-01-29 NOTE — PROGRESS NOTES
[facility]   General Surgery Progress Note           Assessment and Plan:   Assessment:   Hematuria  Induration/swelling at right abdominal incisions, possible hematoma  S/p attempted lap felecia 1/20, aborted due to dense adhesions at the duodenum/triangle of calot and evidence of peritoneal metastasis and ascites   Metastatic breast CA      Plan:   -reg diet  -abx: Rocephin  -monitor abdominal wall site as it may organize into a drainable fluid collection  -dispo per hospitalist         Interval History:   Afebrile. Comfortable in bed, feels unchanged from yesterday.  Reports discomfort at umbilical incision and right abd wall, but not requiring narcotics. Still with hematuria per chart. No new concerns.         Physical Exam:   Blood pressure (!) 144/72, pulse 79, temperature 97.9  F (36.6  C), temperature source Oral, resp. rate 20, weight 66.4 kg (146 lb 4.8 oz), SpO2 94 %, not currently breastfeeding.    I/O last 3 completed shifts:  In: 243 [P.O.:240; I.V.:3]  Out: 775 [Urine:775]    Abdomen:   soft, distended, mild tenderness noted in the right upper quadrant and umbilical incision. Hypoactive bowel sounds   Inc(s) - clean, dry, intact. Very minimal erythema.  + large area of indurated swelling beneath right lateral incisions, mildly tender upon palpation.          Data:     Recent Labs   Lab 01/28/21  0604 01/27/21  1940   WBC 6.8 6.7   HGB 8.4* 9.5*   HCT 25.2* 29.1*   * 110*    336       Madai Vazquez PA-C     Agree with above. Palpable mass in subcutaneous tissues around lateral right laparoscopic incision. Most consistent with a hematoma. No overlying erythema or warmth. Mildly tender. Recommend continued observation.    Gifty Gupta MD

## 2021-01-29 NOTE — PROGRESS NOTES
[facility]  Hospitalist Progress Note  Reymundo Rodriguez MD, MD 01/29/2021  (Text Page)  Reason for Stay (Diagnosis): Abdominal wall cellulitis, gross hematuria         Assessment and Plan:      Summary of Stay: Lisa Galloway is a 75 year old female with complicated medical and oncologic history such as breast cancer with widespread osseous metastatic lesions, recently diagnosed with pancreatitis, newly diagnosed with peritoneal carcinomatosis, recent non-STEMI currently being treated medically, attempted laparoscopic cholecystectomy, ascites, and was discharged last 1/22/2021 and presented back in the emergency room earlier today due to concerns of possible hematuria as her urine becoming more pinkish to reddish color with accompanying increasing lump and pain on the right upper quadrant prior incision site for her attempted cholecystectomy     1.  Suspected cellulitis of abdominal wall, could also be hematoma  -Continue to monitor as we organized or drainable fluid collection  2.  Gross hematuria, UTI, possible hemorrhagic cystitis  -Hemoglobin remained stable  3.  Newly diagnosed peritoneal carcinomatosis  4.  Metastatic breast cancer with known widespread osseous metastatic numerous lesions  5.  Coronary artery disease with recent non-STEMI  6.  Physical deconditioning  7.  Severe malnutrition secondary to chronic illness  8.  Stable chronic macrocytic anemia  9.  Stable CKD     Continuation of inpatient care.  Remain on IV antibiotics with ceftriaxone   -Afebrile, stable hemodynamics  -Cultures to be followed with her UTI-urine cultures grew normal surinder  -No drainable abscess seen on her suspected abdominal wall cellulitis.  General surgery service made aware from the emergency room.  Requested formal evaluation as inpatient-awaiting formal input  -Kidney function, electrolytes are within normal limits.  -Patient complaining of hematuria no worsening anemia seen.  Suspected hemorrhagic  cystitis  No reported other bleeding tendencies symptomatology.  -With this hematuria hold her aspirin for now  -Resume her statins and beta-blockers if blood pressures permitting given recent non-STEMI, newly diagnosed cardiomyopathy  -May have regular diet  -Please ambulate as much as he can, please do out of bed to chair activities as much as she can tolerate     Code status: DNR/DNI  Admit to inpatient  Prophylaxis: Mechanical, no chemo DVT prophylaxis for now given earlier hematuria  Disposition: Hopeful for home discharge but likely might be requiring at least 1-2 more inpatient hospitalization days              Interval History (Subjective):      Continuing care today.  Seen and examined.  Chart reviewed.    No significant reported events overnight.  As always Mariana remained very pleasant, conversant.  She thinks that she is doing just fine.  Tolerating oral diet.  No escalation of right-sided abdominal discomfort or tenderness.  Currently remain afebrile.              Physical Exam:      Last Vital Signs:  /83 (BP Location: Left arm)   Pulse 84   Temp 99.2  F (37.3  C) (Axillary)   Resp 18   Wt 66.4 kg (146 lb 4.8 oz)   SpO2 95%   BMI 26.76 kg/m      I/O last 3 completed shifts:  In: 243 [P.O.:240; I.V.:3]  Out: 775 [Urine:775]  Wt Readings from Last 1 Encounters:   01/28/21 66.4 kg (146 lb 4.8 oz)     Vitals:    01/27/21 1834 01/28/21 0037   Weight: 68.9 kg (152 lb) 66.4 kg (146 lb 4.8 oz)       Constitutional: Awake, alert, cooperative, no apparent distress   Respiratory: Clear to auscultation bilaterally, no crackles or wheezing   Cardiovascular: Regular rate and rhythm, normal S1 and S2, and no murmur noted   Abdomen: Normal bowel sounds, tender upon palpation right upper quadrant, palpable lump, no bleeding, no discharge, no erythema erythema, nonwarmth, non-distended   Skin: No rashes, no cyanosis, dry to touch   Neuro: Alert and oriented x3, no weakness, spontaneous and coherent speech    Extremities: No edema, normal range of motion   Other(s): Euthymic mood, not agitated       All other systems: Negative          Medications:      All current medications were reviewed with changes reflected in problem list.         Data:      All new lab and imaging data was reviewed.   Labs:  Recent Labs   Lab 01/27/21 2003 01/27/21 1940 01/27/21 1849   CULT No growth after 2 days No growth after 2 days <10,000 colonies/mL  mixed urogenital surinder  Susceptibility testing not routinely done       Recent Labs   Lab 01/28/21 0604 01/27/21 1940   WBC 6.8 6.7   HGB 8.4* 9.5*   HCT 25.2* 29.1*   * 110*    336     Recent Labs   Lab 01/29/21  0735 01/28/21  1520 01/28/21 0604 01/27/21 1940 01/27/21 1940   NA  --   --  140  --  140   POTASSIUM 3.6 4.4 3.0*   < > 3.6   CHLORIDE  --   --  109  --  107   CO2  --   --  24  --  27   ANIONGAP  --   --  7  --  6   GLC  --   --  82  --  79   BUN  --   --  10  --  11   CR  --   --  0.88  --  0.97   GFRESTIMATED  --   --  64  --  57*   GFRESTBLACK  --   --  74  --  66   EARL  --   --  7.6*  --  7.8*    < > = values in this interval not displayed.     No results for input(s): SED, CRP in the last 168 hours.  Recent Labs   Lab 01/28/21 0604 01/27/21 1940   GLC 82 79     No results for input(s): LIPASE in the last 168 hours.  No results for input(s): CHOL, HDL, LDL, TRIG, CHOLHDLRATIO in the last 168 hours.  No results for input(s): TROPONIN, TROPI, TROPR in the last 168 hours.    Invalid input(s): TROP, TROPONINIES  Recent Labs   Lab 01/27/21 1849   COLOR Red   APPEARANCE Cloudy   URINEGLC Negative   URINEBILI Negative   URINEKETONE 40*   SG 1.029   UBLD Large*   URINEPH 6.0   PROTEIN 200*   NITRITE Negative   LEUKEST Moderate*   RBCU >182*   WBCU >182*      Imaging:   Results for orders placed or performed during the hospital encounter of 01/27/21   CT Abdomen Pelvis w Contrast    Narrative    EXAM: CT ABDOMEN PELVIS W CONTRAST  LOCATION: [facility]  SERVICES  DATE/TIME: 1/27/2021 8:24 PM    INDICATION: Hematuria.  COMPARISON: 01/08/2021.  TECHNIQUE: CT scan of the abdomen and pelvis was performed following injection of IV contrast. Multiplanar reformats were obtained. Dose reduction techniques were used.  CONTRAST: 53 mL Isovue-370.    FINDINGS:   LOWER CHEST: Bilateral breast prostheses. Small pleural effusions have developed with bibasilar consolidation. Moderate-sized esophageal hiatal hernia.    HEPATOBILIARY: Gallbladder is distended. Stable cystic mass anterior to the gallbladder with increasing perihepatic ascites.    PANCREAS: Normal.    SPLEEN: Normal.    ADRENAL GLANDS: Normal.    KIDNEYS/BLADDER: Double-J right ureteric stent without significant hydronephrosis.    BOWEL: Wall thickening involving a long segment of small bowel within the right midabdomen laterally compatible with enteritis.    LYMPH NODES: Normal.    VASCULATURE: Unremarkable.    PELVIC ORGANS: Small amount of ascites.    MUSCULOSKELETAL: Diffuse sclerotic metastatic disease throughout the visualized skeleton.      Impression    IMPRESSION:   1.  There is worsening perihepatic and abdominal ascites. Gallbladder is mildly distended with stable cyst anterior to the gallbladder. There is new subcutaneous air in the upper abdominal wall presumably postsurgical. Large amount of subcutaneous fluid   and stranding now seen in the right lateral abdominal wall.    2.  Wall thickening involving a few small bowel loops in the right midabdomen compatible with enteritis.    3.  Double-J right ureteric stent without significant hydronephrosis. Parapelvic cysts in the right kidney. A few nonobstructing stones in the upper left kidney.    4.  Breast prostheses with diffuse sclerotic metastatic lesions throughout the visualized skeleton.

## 2021-01-29 NOTE — PLAN OF CARE
Tmax 99.2. 98.7 at lunch. Generalized weakness. Poor appetite. Needs encouragement for oob and up to chair. Ls clear. Bloody urine. Hgb 8.4. A&Ox4 calls apropriately. Edu given on risk of falls and pt agrees to call before getting oob. Sats stable on RA

## 2021-01-30 LAB
ANION GAP SERPL CALCULATED.3IONS-SCNC: 5 MMOL/L (ref 3–14)
BASOPHILS # BLD AUTO: 0.1 10E9/L (ref 0–0.2)
BASOPHILS NFR BLD AUTO: 1 %
BUN SERPL-MCNC: 7 MG/DL (ref 7–30)
CALCIUM SERPL-MCNC: 7.5 MG/DL (ref 8.5–10.1)
CHLORIDE SERPL-SCNC: 109 MMOL/L (ref 94–109)
CO2 SERPL-SCNC: 24 MMOL/L (ref 20–32)
CREAT SERPL-MCNC: 0.81 MG/DL (ref 0.52–1.04)
DIFFERENTIAL METHOD BLD: ABNORMAL
EOSINOPHIL # BLD AUTO: 0.4 10E9/L (ref 0–0.7)
EOSINOPHIL NFR BLD AUTO: 5.2 %
ERYTHROCYTE [DISTWIDTH] IN BLOOD BY AUTOMATED COUNT: 14.7 % (ref 10–15)
GFR SERPL CREATININE-BSD FRML MDRD: 70 ML/MIN/{1.73_M2}
GLUCOSE SERPL-MCNC: 81 MG/DL (ref 70–99)
HCT VFR BLD AUTO: 25.5 % (ref 35–47)
HGB BLD-MCNC: 8.4 G/DL (ref 11.7–15.7)
IMM GRANULOCYTES # BLD: 0 10E9/L (ref 0–0.4)
IMM GRANULOCYTES NFR BLD: 0.3 %
LYMPHOCYTES # BLD AUTO: 0.6 10E9/L (ref 0.8–5.3)
LYMPHOCYTES NFR BLD AUTO: 9.1 %
MCH RBC QN AUTO: 35.3 PG (ref 26.5–33)
MCHC RBC AUTO-ENTMCNC: 32.9 G/DL (ref 31.5–36.5)
MCV RBC AUTO: 107 FL (ref 78–100)
MONOCYTES # BLD AUTO: 1 10E9/L (ref 0–1.3)
MONOCYTES NFR BLD AUTO: 15.5 %
NEUTROPHILS # BLD AUTO: 4.6 10E9/L (ref 1.6–8.3)
NEUTROPHILS NFR BLD AUTO: 68.9 %
NRBC # BLD AUTO: 0 10*3/UL
NRBC BLD AUTO-RTO: 0 /100
PLATELET # BLD AUTO: 285 10E9/L (ref 150–450)
POTASSIUM SERPL-SCNC: 3.5 MMOL/L (ref 3.4–5.3)
RBC # BLD AUTO: 2.38 10E12/L (ref 3.8–5.2)
SODIUM SERPL-SCNC: 138 MMOL/L (ref 133–144)
WBC # BLD AUTO: 6.7 10E9/L (ref 4–11)

## 2021-01-30 PROCEDURE — 99231 SBSQ HOSP IP/OBS SF/LOW 25: CPT | Mod: 24 | Performed by: SURGERY

## 2021-01-30 PROCEDURE — 120N000001 HC R&B MED SURG/OB

## 2021-01-30 PROCEDURE — 250N000013 HC RX MED GY IP 250 OP 250 PS 637: Performed by: INTERNAL MEDICINE

## 2021-01-30 PROCEDURE — 80048 BASIC METABOLIC PNL TOTAL CA: CPT | Performed by: INTERNAL MEDICINE

## 2021-01-30 PROCEDURE — 250N000011 HC RX IP 250 OP 636: Performed by: INTERNAL MEDICINE

## 2021-01-30 PROCEDURE — 85025 COMPLETE CBC W/AUTO DIFF WBC: CPT | Performed by: INTERNAL MEDICINE

## 2021-01-30 PROCEDURE — 84132 ASSAY OF SERUM POTASSIUM: CPT | Performed by: INTERNAL MEDICINE

## 2021-01-30 PROCEDURE — 36415 COLL VENOUS BLD VENIPUNCTURE: CPT | Performed by: INTERNAL MEDICINE

## 2021-01-30 PROCEDURE — 99232 SBSQ HOSP IP/OBS MODERATE 35: CPT | Performed by: INTERNAL MEDICINE

## 2021-01-30 RX ADMIN — GABAPENTIN 900 MG: 300 CAPSULE ORAL at 21:04

## 2021-01-30 RX ADMIN — ATORVASTATIN CALCIUM 40 MG: 40 TABLET, FILM COATED ORAL at 21:04

## 2021-01-30 RX ADMIN — METOPROLOL TARTRATE 25 MG: 25 TABLET, FILM COATED ORAL at 21:04

## 2021-01-30 RX ADMIN — OMEPRAZOLE 20 MG: 20 CAPSULE, DELAYED RELEASE ORAL at 06:53

## 2021-01-30 RX ADMIN — METOPROLOL TARTRATE 25 MG: 25 TABLET, FILM COATED ORAL at 08:18

## 2021-01-30 RX ADMIN — DONEPEZIL HYDROCHLORIDE 10 MG: 10 TABLET ORAL at 21:04

## 2021-01-30 RX ADMIN — MULTIPLE VITAMINS W/ MINERALS TAB 1 TABLET: TAB at 08:19

## 2021-01-30 RX ADMIN — Medication 1 LOZENGE: at 22:13

## 2021-01-30 RX ADMIN — HYDROMORPHONE HYDROCHLORIDE 0.2 MG: 1 INJECTION, SOLUTION INTRAMUSCULAR; INTRAVENOUS; SUBCUTANEOUS at 22:19

## 2021-01-30 ASSESSMENT — ACTIVITIES OF DAILY LIVING (ADL)
ADLS_ACUITY_SCORE: 16

## 2021-01-30 NOTE — PROGRESS NOTES
[facility]  Hospitalist Progress Note  Reymundo Rodriguez MD, MD 01/30/2021  (Text Page)  Reason for Stay (Diagnosis): Abdominal wall cellulitis, gross hematuria         Assessment and Plan:      Summary of Stay: Lisa Galloway is a 75 year old female with complicated medical and oncologic history such as breast cancer with widespread osseous metastatic lesions, recently diagnosed with pancreatitis, newly diagnosed with peritoneal carcinomatosis, recent non-STEMI currently being treated medically, attempted laparoscopic cholecystectomy, ascites, and was discharged last 1/22/2021 and presented back in the emergency room earlier today due to concerns of possible hematuria as her urine becoming more pinkish to reddish color with accompanying increasing lump and pain on the right upper quadrant prior incision site for her attempted cholecystectomy     1.  Suspected cellulitis of abdominal wall, could also be hematoma  -Continue to monitor as we organized or drainable fluid collection  2.  Gross hematuria, UTI, possible hemorrhagic cystitis-however urine cultures grew normal surinder  -Hemoglobin remained stable  -Stop antibiotics and continue to monitor while off from it  3.  Newly diagnosed peritoneal carcinomatosis  4.  Metastatic breast cancer with known widespread osseous metastatic numerous lesions  5.  Coronary artery disease with recent non-STEMI  6.  Physical deconditioning  7.  Severe malnutrition secondary to chronic illness  8.  Stable chronic macrocytic anemia  9.  Stable CKD     Continuation of inpatient care.    -Afebrile, stable hemodynamics  -Cultures to be followed with her UTI-urine cultures grew normal surinder  -No drainable abscess seen on her suspected abdominal wall cellulitis.   -Appreciate general surgery service continues input  -Felt likely hematoma but recommendations in place to continue to monitor as this can develop into a drainable abscess     -Kidney function, electrolytes  are within normal limits.  -Patient complaining of hematuria no worsening anemia seen.  Suspected hemorrhagic cystitis  No reported other bleeding tendencies symptomatology.  -With this hematuria hold her aspirin for now  -Resume her statins and beta-blockers if blood pressures permitting given recent non-STEMI, newly diagnosed cardiomyopathy  -May have regular diet  -Please ambulate as much as he can, please do out of bed to chair activities as much as she can tolerate     Code status: DNR/DNI  Admit to inpatient  Prophylaxis: Mechanical, no chemo DVT prophylaxis for now given earlier hematuria  Disposition: Hopeful for home discharge anticipating in the next 24 to 36 hours if remained afebrile, no worsening of abdominal situation while off antibiotics            Interval History (Subjective):      Continuing care today.  Seen and examined.  Chart reviewed.    No significant reported events overnight.  As always Mariana remained very pleasant, conversant.    Mariana is tolerating oral diet with no reported nausea or vomiting.  No worsening discomfort on right side abdomen  Endorses no bright red blood per rectum, coffee-ground emesis, no gross hematuria       Physical Exam:      Last Vital Signs:  /69 (BP Location: Left arm)   Pulse 79   Temp 98.4  F (36.9  C) (Oral)   Resp 18   Wt 66.4 kg (146 lb 4.8 oz)   SpO2 93%   BMI 26.76 kg/m      I/O last 3 completed shifts:  In: 200 [P.O.:200]  Out: 800 [Urine:800]  Wt Readings from Last 1 Encounters:   01/28/21 66.4 kg (146 lb 4.8 oz)     Vitals:    01/27/21 1834 01/28/21 0037   Weight: 68.9 kg (152 lb) 66.4 kg (146 lb 4.8 oz)       Constitutional: Awake, alert, cooperative, no apparent distress   Respiratory: Clear to auscultation bilaterally, no crackles or wheezing   Cardiovascular: Regular rate and rhythm, normal S1 and S2, and no murmur noted   Abdomen: Normal bowel sounds, tender upon palpation right upper quadrant, palpable lump, no bleeding, no discharge, no  erythema erythema, nonwarmth, non-distended  -Appears to be decreasing in palpable mass   Skin: No rashes, no cyanosis, dry to touch   Neuro: Alert and oriented x3, no weakness, spontaneous and coherent speech   Extremities: No edema, normal range of motion   Other(s): Euthymic mood, not agitated       All other systems: Negative          Medications:      All current medications were reviewed with changes reflected in problem list.         Data:      All new lab and imaging data was reviewed.   Labs:  Recent Labs   Lab 01/27/21 2003 01/27/21 1940 01/27/21  1849   CULT No growth after 3 days No growth after 3 days <10,000 colonies/mL  mixed urogenital surinder  Susceptibility testing not routinely done       Recent Labs   Lab 01/30/21 0554 01/28/21  0604 01/27/21 1940   WBC 6.7 6.8 6.7   HGB 8.4* 8.4* 9.5*   HCT 25.5* 25.2* 29.1*   * 107* 110*    315 336     Recent Labs   Lab 01/30/21 0554 01/29/21 0735 01/28/21 1520 01/28/21 0604 01/27/21 1940 01/27/21 1940     --   --  140  --  140   POTASSIUM 3.5 3.6 4.4 3.0*   < > 3.6   CHLORIDE 109  --   --  109  --  107   CO2 24  --   --  24  --  27   ANIONGAP 5  --   --  7  --  6   GLC 81  --   --  82  --  79   BUN 7  --   --  10  --  11   CR 0.81  --   --  0.88  --  0.97   GFRESTIMATED 70  --   --  64  --  57*   GFRESTBLACK 82  --   --  74  --  66   EARL 7.5*  --   --  7.6*  --  7.8*    < > = values in this interval not displayed.     No results for input(s): SED, CRP in the last 168 hours.  Recent Labs   Lab 01/30/21 0554 01/28/21  0604 01/27/21 1940   GLC 81 82 79     No results for input(s): LIPASE in the last 168 hours.  No results for input(s): CHOL, HDL, LDL, TRIG, CHOLHDLRATIO in the last 168 hours.  No results for input(s): TROPONIN, TROPI, TROPR in the last 168 hours.    Invalid input(s): TROP, TROPONINIES  Recent Labs   Lab 01/27/21  1849   COLOR Red   APPEARANCE Cloudy   URINEGLC Negative   URINEBILI Negative   URINEKETONE 40*   SG  1.029   UBLD Large*   URINEPH 6.0   PROTEIN 200*   NITRITE Negative   LEUKEST Moderate*   RBCU >182*   WBCU >182*      Imaging:   Results for orders placed or performed during the hospital encounter of 01/27/21   CT Abdomen Pelvis w Contrast    Narrative    EXAM: CT ABDOMEN PELVIS W CONTRAST  LOCATION: [facility]  DATE/TIME: 1/27/2021 8:24 PM    INDICATION: Hematuria.  COMPARISON: 01/08/2021.  TECHNIQUE: CT scan of the abdomen and pelvis was performed following injection of IV contrast. Multiplanar reformats were obtained. Dose reduction techniques were used.  CONTRAST: 53 mL Isovue-370.    FINDINGS:   LOWER CHEST: Bilateral breast prostheses. Small pleural effusions have developed with bibasilar consolidation. Moderate-sized esophageal hiatal hernia.    HEPATOBILIARY: Gallbladder is distended. Stable cystic mass anterior to the gallbladder with increasing perihepatic ascites.    PANCREAS: Normal.    SPLEEN: Normal.    ADRENAL GLANDS: Normal.    KIDNEYS/BLADDER: Double-J right ureteric stent without significant hydronephrosis.    BOWEL: Wall thickening involving a long segment of small bowel within the right midabdomen laterally compatible with enteritis.    LYMPH NODES: Normal.    VASCULATURE: Unremarkable.    PELVIC ORGANS: Small amount of ascites.    MUSCULOSKELETAL: Diffuse sclerotic metastatic disease throughout the visualized skeleton.      Impression    IMPRESSION:   1.  There is worsening perihepatic and abdominal ascites. Gallbladder is mildly distended with stable cyst anterior to the gallbladder. There is new subcutaneous air in the upper abdominal wall presumably postsurgical. Large amount of subcutaneous fluid   and stranding now seen in the right lateral abdominal wall.    2.  Wall thickening involving a few small bowel loops in the right midabdomen compatible with enteritis.    3.  Double-J right ureteric stent without significant hydronephrosis. Parapelvic cysts in the right kidney. A  few nonobstructing stones in the upper left kidney.    4.  Breast prostheses with diffuse sclerotic metastatic lesions throughout the visualized skeleton.

## 2021-01-30 NOTE — PROGRESS NOTES
[facility]   General Surgery Progress Note           Assessment and Plan:   Assessment:   Hematuria  Induration/swelling at right abdominal incisions, possible hematoma. No evidence of drainable fluid collection/infection  S/p attempted lap felecia 1/20, aborted due to dense adhesions at the duodenum/triangle of calot and evidence of peritoneal metastasis and ascites   Metastatic breast CA      Plan:   -suspect this is hematoma. Likely will slowly soften and be reabsorbed over time.   -agree with stopping antibiotics  -from surgery standpoint could discharge home today and continue monitoring abdominal wall  -can follow up in Dr Sarabia/ PA clinic in 1 week to ensure improvement  -dispo per hospitalist         Interval History:   Feels well. Pain well controlled. Minimal pain reported at RUQ. Up and moving. Eating.         Physical Exam:   Blood pressure 124/69, pulse 79, temperature 98.4  F (36.9  C), temperature source Oral, resp. rate 18, weight 66.4 kg (146 lb 4.8 oz), SpO2 93 %, not currently breastfeeding.    I/O last 3 completed shifts:  In: 200 [P.O.:200]  Out: 800 [Urine:800]    Abdomen:   soft, distended, mild tenderness noted in the right upper quadrant   Inc(s) - clean, dry, intact. Very minimal erythema.  + large area of indurated swelling beneath right lateral incisions, mildly tender upon palpation.          Data:     Recent Labs   Lab 01/30/21  0554 01/28/21  0604 01/27/21  1940   WBC 6.7 6.8 6.7   HGB 8.4* 8.4* 9.5*   HCT 25.5* 25.2* 29.1*   * 107* 110*    315 336       Marianna Sarabia MD

## 2021-01-30 NOTE — PLAN OF CARE
VSS. AOX4.  Breathing is unlabored and pt denies SOB.  Standby assist.  Regular diet.  Pt pain 6/10 at HS and pt report unable to sleep; IV dilaudid given with pt reporting mild improvement.    Hematuria still present; aspirin being held; urine output 250cc.  Pt had not been drinking water; staff encouraged pt to drink more water; she still did not want to drink very much.    K+ 3.6 - no replacement indicated.    PCD's worn during shift but pt refuses them when sleeping; pt ambulated once in hallway.

## 2021-01-30 NOTE — PLAN OF CARE
VSS ex elevated bps. LS clear, diminished, congestion heard in bilateral upper quadrants. A&O x4. SBA. Tea colored urine noted. Denies pain.

## 2021-01-30 NOTE — DISCHARGE INSTRUCTIONS
Please call the [facility] office after discharge Monday thru Friday between 8am and 5pm to make an appointment to be seen in approximately 1 week by Physician Assistant  #[phone]

## 2021-01-31 VITALS
TEMPERATURE: 98.1 F | SYSTOLIC BLOOD PRESSURE: 147 MMHG | RESPIRATION RATE: 18 BRPM | WEIGHT: 146.3 LBS | DIASTOLIC BLOOD PRESSURE: 72 MMHG | BODY MASS INDEX: 26.76 KG/M2 | OXYGEN SATURATION: 93 % | HEART RATE: 78 BPM

## 2021-01-31 LAB — POTASSIUM SERPL-SCNC: 3.4 MMOL/L (ref 3.4–5.3)

## 2021-01-31 PROCEDURE — 99239 HOSP IP/OBS DSCHRG MGMT >30: CPT | Performed by: INTERNAL MEDICINE

## 2021-01-31 PROCEDURE — 36415 COLL VENOUS BLD VENIPUNCTURE: CPT | Performed by: INTERNAL MEDICINE

## 2021-01-31 PROCEDURE — 250N000013 HC RX MED GY IP 250 OP 250 PS 637: Performed by: INTERNAL MEDICINE

## 2021-01-31 PROCEDURE — 84132 ASSAY OF SERUM POTASSIUM: CPT | Performed by: INTERNAL MEDICINE

## 2021-01-31 RX ADMIN — MULTIPLE VITAMINS W/ MINERALS TAB 1 TABLET: TAB at 08:19

## 2021-01-31 RX ADMIN — OMEPRAZOLE 20 MG: 20 CAPSULE, DELAYED RELEASE ORAL at 06:41

## 2021-01-31 RX ADMIN — METOPROLOL TARTRATE 25 MG: 25 TABLET, FILM COATED ORAL at 08:20

## 2021-01-31 ASSESSMENT — ACTIVITIES OF DAILY LIVING (ADL)
ADLS_ACUITY_SCORE: 16

## 2021-01-31 NOTE — PLAN OF CARE
VSS, denied pain. Bruising to L arm. Infrequent weak cough, 1+ BLE edema. RLQ abdomen remains firm. States no appetite, encouraging oral intake. Regular diet, SBA. Slept well overnight.

## 2021-01-31 NOTE — DISCHARGE SUMMARY
[facility]  Discharge Summary  Name: Lisa Galloway    MRN: [id number]  YOB: 1945    Age: 75 year old  Date of Discharge:  1/31/2021  Date of Admission: 1/27/2021  Primary Care Provider: Nichole Lira  Discharge Physician:  Reymundo Rodriguez MD  Discharging Service:  Hospitalist      Discharge Diagnosis:  Abdominal wall induration, swelling likely secondary to hematoma  Ruled out urinary tract infection with urine cultures growing normal surinder  Newly diagnosed peritoneal carcinomatosis  Known metastatic breast cancer with widespread osseous metastatic numerous locations  History of CAD with recent non-STEMI  Severe malnutrition secondary to chronic illness  Stable chronic Macrocytic anemia  Stable CKD       Other Diagnosis:  No past medical history on file.       Discharge Disposition:  Discharged to home     Allergies:  Allergies   Allergen Reactions     Amoxicillin-Pot Clavulanate Itching     Erythromycin Nausea     Latex      PN: Latex Sensitivity Flag     Levofloxacin      PN: joint pain     Sulfa Drugs GI Disturbance     Oxycodone Rash        Discharge Medications:   Current Discharge Medication List      CONTINUE these medications which have NOT CHANGED    Details   acetaminophen (TYLENOL) 325 MG tablet Take 325-650 mg by mouth every 6 hours as needed for mild pain      atorvastatin (LIPITOR) 40 MG tablet Take 1 tablet (40 mg) by mouth every evening  Qty: 30 tablet, Refills: 0    Associated Diagnoses: ACS (acute coronary syndrome) (H)      donepezil (ARICEPT) 10 MG tablet Take 10 mg by mouth At Bedtime      gabapentin (NEURONTIN) 300 MG capsule Take 900 mg by mouth At Bedtime For restless leg syndrome       metoprolol tartrate (LOPRESSOR) 25 MG tablet Take 1 tablet (25 mg) by mouth 2 times daily  Qty: 60 tablet, Refills: 0    Associated Diagnoses: ACS (acute coronary syndrome) (H)      Multiple Vitamins-Minerals (MULTIVITAMIN ADULT) TABS Take 1 tablet by mouth daily      omeprazole  (PRILOSEC) 20 MG DR capsule Take 1 capsule (20 mg) by mouth every morning (before breakfast)  Qty: 30 capsule, Refills: 0    Associated Diagnoses: ACS (acute coronary syndrome) (H)      ondansetron (ZOFRAN-ODT) 4 MG ODT tab Take 1 tablet (4 mg) by mouth every 6 hours as needed for nausea or vomiting  Qty: 30 tablet, Refills: 0    Associated Diagnoses: ACS (acute coronary syndrome) (H)      ribociclib (KISQALI) 200 MG tablet Take 2 tablets (400 mg) by mouth daily for 21 days, followed by 7 days off in a 28 day cycle         STOP taking these medications       aspirin (ASA) 81 MG EC tablet Comments:   Reason for Stopping:         HYDROmorphone (DILAUDID) 2 MG tablet Comments:   Reason for Stopping:                Condition on Discharge:  Discharge condition: Stable   Discharge vitals: Blood pressure (!) 147/72, pulse 78, temperature 98.1  F (36.7  C), temperature source Oral, resp. rate 18, weight 66.4 kg (146 lb 4.8 oz), SpO2 93 %, not currently breastfeeding.   Code status on discharge: DNR / DNI     History of Present Illness:  See detailed admission note for full details.        Significant Physical Exam Findings Day of Discharge:  HEENT; Atraumatic, normocephalic, pinkish conjuctiva, pupils bilateral reactive   Skin: warm and moist, no rashes  Lungs: equal chest expansion, clear to auscultation, no wheezes, no stridor, no crackles,   Heart: normal rate, normal rhythm, no rubs or gallops.   Abdomen: Normal bowel sounds, tender upon palpation right upper quadrant, palpable lump, no bleeding, no discharge, no erythema erythema, nonwarmth, non-distended  -Appears to be decreasing in palpable mass  Extremities: no deformities, no edema   Neuro; follow commands, alert and oriented x3, spontaneous speech, coherent, moves all extremities spontaneously  Psych; no hallucination, euthymic mood, not agitated        Procedures other than Imaging:  none     Imaging:  Results for orders placed or performed during the hospital  encounter of 01/27/21   CT Abdomen Pelvis w Contrast    Narrative    EXAM: CT ABDOMEN PELVIS W CONTRAST  LOCATION: [facility]  DATE/TIME: 1/27/2021 8:24 PM    INDICATION: Hematuria.  COMPARISON: 01/08/2021.  TECHNIQUE: CT scan of the abdomen and pelvis was performed following injection of IV contrast. Multiplanar reformats were obtained. Dose reduction techniques were used.  CONTRAST: 53 mL Isovue-370.    FINDINGS:   LOWER CHEST: Bilateral breast prostheses. Small pleural effusions have developed with bibasilar consolidation. Moderate-sized esophageal hiatal hernia.    HEPATOBILIARY: Gallbladder is distended. Stable cystic mass anterior to the gallbladder with increasing perihepatic ascites.    PANCREAS: Normal.    SPLEEN: Normal.    ADRENAL GLANDS: Normal.    KIDNEYS/BLADDER: Double-J right ureteric stent without significant hydronephrosis.    BOWEL: Wall thickening involving a long segment of small bowel within the right midabdomen laterally compatible with enteritis.    LYMPH NODES: Normal.    VASCULATURE: Unremarkable.    PELVIC ORGANS: Small amount of ascites.    MUSCULOSKELETAL: Diffuse sclerotic metastatic disease throughout the visualized skeleton.      Impression    IMPRESSION:   1.  There is worsening perihepatic and abdominal ascites. Gallbladder is mildly distended with stable cyst anterior to the gallbladder. There is new subcutaneous air in the upper abdominal wall presumably postsurgical. Large amount of subcutaneous fluid   and stranding now seen in the right lateral abdominal wall.    2.  Wall thickening involving a few small bowel loops in the right midabdomen compatible with enteritis.    3.  Double-J right ureteric stent without significant hydronephrosis. Parapelvic cysts in the right kidney. A few nonobstructing stones in the upper left kidney.    4.  Breast prostheses with diffuse sclerotic metastatic lesions throughout the visualized skeleton.        Consultations:  Consultation  during this admission received from surgery.     Recent Lab Results:  Recent Labs   Lab 01/30/21  0554 01/28/21  0604 01/27/21 1940   WBC 6.7 6.8 6.7   HGB 8.4* 8.4* 9.5*   HCT 25.5* 25.2* 29.1*   * 107* 110*    315 336     Recent Labs   Lab 01/27/21 2003 01/27/21 1940 01/27/21 1849   CULT No growth after 4 days No growth after 4 days <10,000 colonies/mL  mixed urogenital surinder  Susceptibility testing not routinely done       Recent Labs   Lab 01/31/21  0730 01/30/21  0554 01/29/21  0735 01/28/21  0604 01/28/21 0604 01/27/21 1940 01/27/21 1940   NA  --  138  --   --  140  --  140   POTASSIUM 3.4 3.5 3.6   < > 3.0*   < > 3.6   CHLORIDE  --  109  --   --  109  --  107   CO2  --  24  --   --  24  --  27   ANIONGAP  --  5  --   --  7  --  6   GLC  --  81  --   --  82  --  79   BUN  --  7  --   --  10  --  11   CR  --  0.81  --   --  0.88  --  0.97   GFRESTIMATED  --  70  --   --  64  --  57*   GFRESTBLACK  --  82  --   --  74  --  66   EARL  --  7.5*  --   --  7.6*  --  7.8*    < > = values in this interval not displayed.     Recent Labs   Lab 01/30/21  0554 01/28/21  0604 01/27/21 1940   GLC 81 82 79     Recent Labs   Lab 01/27/21 1940   LACT 0.9     No results for input(s): TROPONIN, TROPI, TROPR in the last 168 hours.    Invalid input(s): TROP, TROPONINIES  Recent Labs   Lab 01/27/21 1849   COLOR Red   APPEARANCE Cloudy   URINEGLC Negative   URINEBILI Negative   URINEKETONE 40*   SG 1.029   UBLD Large*   URINEPH 6.0   PROTEIN 200*   NITRITE Negative   LEUKEST Moderate*   RBCU >182*   WBCU >182*          Pending Results:    Unresulted Labs Ordered in the Past 30 Days of this Admission     Date and Time Order Name Status Description    1/27/2021 1923 Blood culture Preliminary     1/27/2021 1912 Blood culture Preliminary            Discharge Instructions and Follow-Up:   Discharge diet: Orders Placed This Encounter      Combination Diet Regular Diet Adult      Diet     Discharge activity: Activity  as tolerated   Discharge follow-up: 1 week with PCP  Follow-up with your oncology as you are receiving daily chemotherapy for history of breast cancer   Outpatient therapy: None    Other instructions: None      Hospital Course:  Do you care today for this very pleasant 75-year-old  lady who initially presented here with concerns for pinkish urine and increasing palpable abdominal wall mass and discomfort with concerns for possible cellulitis and concurrent UTI.  She was initially started on IV antibiotics with ceftriaxone and eventually taken off as urine cultures grew normal surinder, her abdominal wall palpable mass appears to be more of a hematoma without underlying cellulitis or abscess.  She was monitored while off antibiotics with no recurrence of any of fever spikes, no leukocytosis, no worsening of the palpable induration.  She remained hemodynamically stable, afebrile, tolerating oral diet with no reported of nausea or vomiting.  No further gross hematuria seen.  Subsequent labs showed stable kidney function, electrolytes and hemoglobin levels.  Patient is optimized from a surgical perspective.  Hoping to go home today and will proceed with that.    I will refer you to excerpts of prior progress notes as listed below for other details of her hospital stay.  Summary of Stay: Lisa Galloway is a 75 year old female with complicated medical and oncologic history such as breast cancer with widespread osseous metastatic lesions, recently diagnosed with pancreatitis, newly diagnosed with peritoneal carcinomatosis, recent non-STEMI currently being treated medically, attempted laparoscopic cholecystectomy, ascites, and was discharged last 1/22/2021 and presented back in the emergency room earlier today due to concerns of possible hematuria as her urine becoming more pinkish to reddish color with accompanying increasing lump and pain on the right upper quadrant prior incision site for her attempted  cholecystectomy     1.  Suspected cellulitis of abdominal wall, could also be hematoma  -Continue to monitor as we organized or drainable fluid collection  2.  Gross hematuria, UTI, possible hemorrhagic cystitis-however urine cultures grew normal surinder  -Hemoglobin remained stable  -Stop antibiotics and continue to monitor while off from it  3.  Newly diagnosed peritoneal carcinomatosis  4.  Metastatic breast cancer with known widespread osseous metastatic numerous lesions  5.  Coronary artery disease with recent non-STEMI  6.  Physical deconditioning  7.  Severe malnutrition secondary to chronic illness  8.  Stable chronic macrocytic anemia  9.  Stable CKD     Continuation of inpatient care.    -Afebrile, stable hemodynamics  -Cultures to be followed with her UTI-urine cultures grew normal surinder  -No drainable abscess seen on her suspected abdominal wall cellulitis.   -Appreciate general surgery service continues input  -Felt likely hematoma but recommendations in place to continue to monitor as this can develop into a drainable abscess     -Kidney function, electrolytes are within normal limits.  -Patient complaining of hematuria no worsening anemia seen.  Suspected hemorrhagic cystitis  No reported other bleeding tendencies symptomatology.  -With this hematuria hold her aspirin for now  -Resume her statins and beta-blockers if blood pressures permitting given recent non-STEMI, newly diagnosed cardiomyopathy  -May have regular diet  -Please ambulate as much as he can, please do out of bed to chair activities as much as she can tolerate     Code status: DNR/DNI  Admit to inpatient  Prophylaxis: Mechanical, no chemo DVT prophylaxis for now given earlier hematuria     Total time spent in face to face contact with the patient and coordinating discharge was:  > 30 Minutes.

## 2021-01-31 NOTE — PLAN OF CARE
VSS. AOX4.  Breathing is unlabored and pt denies SOB.  Standby assist.  Regular diet; staff encouraged pt to eat and drink; pt only ate 50% of a small dinner.     No hematuria this shift; pt voiding WDL.     K+ 3.5 - no replacement indicated.     PCD's worn during shift but pt refuses them when sleeping.    Possible discharge tomorrow.

## 2021-01-31 NOTE — PLAN OF CARE
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, HTN, GERD, Depression, See chart for complete list   Labs/Protocols: Potassium Protocol: K+-3.4  Vitals: Temp: 98.1  F (36.7  C) Temp src: Oral BP: (!) 147/72 Pulse: 78   Resp: 18 SpO2: 93 % O2 Device: None (Room air)    Pain: Denies pain at this time   Cardiac: Regular rate and rhythm   Respiratory: Lungs are diminished   Neuro: A&Ox4-Forgetful   GI/: Continent of B/B  Skin: Intact   LDA: PIV-SL  Diet: Regular-Poor appetite-Encouraged intake   Activity: SBA   Pt educated on current POC: Monitor abdominal pain  Discharge Plan: Discharge today       10:45 AM  Pt acknowledges receipt of belongings, discharge instructions, medication education and follow up appointments. Pt left in stable condition in care of .

## 2021-02-02 LAB
BACTERIA SPEC CULT: NO GROWTH
BACTERIA SPEC CULT: NO GROWTH
Lab: NORMAL
Lab: NORMAL
SPECIMEN SOURCE: NORMAL
SPECIMEN SOURCE: NORMAL

## 2021-02-05 ENCOUNTER — TRANSFERRED RECORDS (OUTPATIENT)
Dept: HEALTH INFORMATION MANAGEMENT | Facility: CLINIC | Age: 76
End: 2021-02-05

## 2021-03-27 LAB — COPATH REPORT: NORMAL

## 2023-04-01 NOTE — ANESTHESIA PROCEDURE NOTES
Airway   Date/Time: 1/20/2021 11:27 AM   Patient location during procedure: OR  Staff -   CRNA: Negrito Jarquin APRN CRNA  Performed By: CRNA    Consent for Airway   Urgency: elective    Indications and Patient Condition  Indications for airway management: mendel-procedural  Induction type:intravenousMask difficulty assessment: 1 - vent by mask    Final Airway Details  Final airway type: endotracheal airway  Successful airway:ETT - single and Oral  Endotracheal Airway Details   ETT size (mm): 7.0  Cuffed: yes  Successful intubation technique: direct laryngoscopy  Grade View of Cords: 1  Adjucts: stylet  Measured from: lips  Secured at (cm): 20  Secured with: plastic tape  Bite block used: None    Post intubation assessment   Placement verified by: capnometry, equal breath sounds and chest rise   Number of attempts at approach: 1  Number of other approaches attempted: 0  Secured with:plastic tape  Ease of procedure: easy  Dentition: Intact and Unchanged         Negative

## (undated) DEVICE — ESU ELEC BLADE 2.75" COATED/INSULATED E1455

## (undated) DEVICE — Device

## (undated) DEVICE — LINEN TOWEL PACK X5 5464

## (undated) DEVICE — LINEN FULL SHEET 5511

## (undated) DEVICE — SOL NACL 0.9% IRRIG 3000ML BAG 2B7477

## (undated) DEVICE — SOL NACL 0.9% INJ 250ML BAG 2B1322Q

## (undated) DEVICE — EVAC SYSTEM CLEAR FLOW SC082500

## (undated) DEVICE — NDL 22GA 1.5"

## (undated) DEVICE — LINEN POUCH DBL 5427

## (undated) DEVICE — RAD RX ISOVUE 300 (50ML) 61% IOPAMIDOL CHARGE PER ML

## (undated) DEVICE — ENDO TROCAR FIRST ENTRY KII FIOS Z-THRD 05X100MM CTF03

## (undated) DEVICE — SU VICRYL 0 UR-6 27" J603H

## (undated) DEVICE — GLOVE PROTEXIS BLUE W/NEU-THERA 7.0  2D73EB70

## (undated) DEVICE — ESU GROUND PAD ADULT W/CORD E7507

## (undated) DEVICE — CLIP APPLIER ENDO 5MM M/L LIGAMAX EL5ML

## (undated) DEVICE — BLADE KNIFE SURG 11 371111

## (undated) DEVICE — SYR 30ML LL W/O NDL 302832

## (undated) DEVICE — DECANTER BAG 2002S

## (undated) DEVICE — CATH CHOLANGIOGRAM KUMAR CC-019

## (undated) DEVICE — SUCTION CANISTER MEDIVAC LINER 3000ML W/LID 65651-530

## (undated) DEVICE — GLOVE PROTEXIS MICRO 6.5  2D73PM65

## (undated) DEVICE — DRAPE LEGGINGS 8421

## (undated) DEVICE — ENDO TROCAR BLUNT TIP KII BALLOON 12X100MM C0R47

## (undated) DEVICE — SOL NACL 0.9% IRRIG 1000ML BOTTLE 2F7124

## (undated) DEVICE — DECANTER VIAL 2006S

## (undated) DEVICE — ESU LIGASURE LAPAROSCOPIC BLUNT TIP SEALER 5MMX37CM LF1837

## (undated) DEVICE — LINEN HALF SHEET 5512

## (undated) DEVICE — ENDO POUCH UNIV RETRIEVAL SYSTEM INZII 10MM CD001

## (undated) DEVICE — ENDO TROCAR SLEEVE KII Z-THREADED 05X100MM CTS02

## (undated) DEVICE — SU VICRYL 4-0 PS-2 18" UND J496H

## (undated) DEVICE — DRAPE C-ARM 60X42" 1013

## (undated) DEVICE — CONNECTOR STOPCOCK 3 WAY MALE LL HI-FLO MX9311L

## (undated) DEVICE — BAG CLEAR TRASH 1.3M 39X33" P4040C

## (undated) DEVICE — SUCTION IRR STRYKERFLOW II W/TIP 250-070-520

## (undated) DEVICE — ESU PENCIL W/HOLSTER E2350H

## (undated) DEVICE — ESU CORD MONOPOLAR 10'  E0510

## (undated) RX ORDER — CEFAZOLIN SODIUM 2 G/100ML
INJECTION, SOLUTION INTRAVENOUS
Status: DISPENSED
Start: 2021-01-20

## (undated) RX ORDER — FENTANYL CITRATE 50 UG/ML
INJECTION, SOLUTION INTRAMUSCULAR; INTRAVENOUS
Status: DISPENSED
Start: 2021-01-20

## (undated) RX ORDER — VASOPRESSIN 20 U/ML
INJECTION PARENTERAL
Status: DISPENSED
Start: 2021-01-20

## (undated) RX ORDER — BUPIVACAINE HYDROCHLORIDE AND EPINEPHRINE 5; 5 MG/ML; UG/ML
INJECTION, SOLUTION EPIDURAL; INTRACAUDAL; PERINEURAL
Status: DISPENSED
Start: 2021-01-20